# Patient Record
Sex: FEMALE | Race: WHITE | NOT HISPANIC OR LATINO | Employment: FULL TIME | URBAN - METROPOLITAN AREA
[De-identification: names, ages, dates, MRNs, and addresses within clinical notes are randomized per-mention and may not be internally consistent; named-entity substitution may affect disease eponyms.]

---

## 2017-02-20 ENCOUNTER — ALLSCRIPTS OFFICE VISIT (OUTPATIENT)
Dept: OTHER | Facility: OTHER | Age: 37
End: 2017-02-20

## 2018-01-14 VITALS
HEIGHT: 67 IN | BODY MASS INDEX: 45.99 KG/M2 | WEIGHT: 293 LBS | DIASTOLIC BLOOD PRESSURE: 90 MMHG | HEART RATE: 90 BPM | SYSTOLIC BLOOD PRESSURE: 130 MMHG

## 2018-01-15 NOTE — MISCELLANEOUS
Message  Return to work or school:   Dorothy Max is under my professional care  She was seen in my office on  2/10/2016    She is able to perform activities of daily living without limitations  OUT OF WORK ON 2/9/2016 DUE TO ILLNESS  DR SUERO/ABUNDIO        Signatures   Electronically signed by : ALEXANDER Mckeon ; Feb 11 2016  1:24PM EST                       (Author)

## 2018-01-31 DIAGNOSIS — F32.A DEPRESSION, UNSPECIFIED DEPRESSION TYPE: Primary | ICD-10-CM

## 2018-01-31 RX ORDER — SERTRALINE HYDROCHLORIDE 100 MG/1
TABLET, FILM COATED ORAL
COMMUNITY
Start: 2012-04-25 | End: 2018-01-31 | Stop reason: SDUPTHER

## 2018-01-31 RX ORDER — SERTRALINE HYDROCHLORIDE 100 MG/1
200 TABLET, FILM COATED ORAL DAILY
Qty: 60 TABLET | Refills: 0 | Status: SHIPPED | OUTPATIENT
Start: 2018-01-31 | End: 2018-03-22 | Stop reason: SDUPTHER

## 2018-01-31 NOTE — TELEPHONE ENCOUNTER
Patient is requesting a refill   Patient saw you for a follow up and refills in 10/2017  Was advised a follow up in 1 month  There seems to be some confusion with patients with these follow ups  They assume they had a follow up visit not realizing extenders are telling them to follow up in a month  Patients assume they did their follow up  Also it states in last note labs were ordered I do not see lab orders or results in the old system

## 2018-01-31 NOTE — TELEPHONE ENCOUNTER
Spoke with the patient over the phone  Refill of sertraline sent to the pharmacy  Patient instructed to follow-up with Dr Yosi Ugalde for her regular follow-up

## 2018-03-22 ENCOUNTER — OFFICE VISIT (OUTPATIENT)
Dept: FAMILY MEDICINE CLINIC | Facility: CLINIC | Age: 38
End: 2018-03-22
Payer: COMMERCIAL

## 2018-03-22 VITALS
HEIGHT: 67 IN | OXYGEN SATURATION: 98 % | HEART RATE: 90 BPM | DIASTOLIC BLOOD PRESSURE: 80 MMHG | BODY MASS INDEX: 45.99 KG/M2 | RESPIRATION RATE: 16 BRPM | SYSTOLIC BLOOD PRESSURE: 120 MMHG | WEIGHT: 293 LBS

## 2018-03-22 DIAGNOSIS — F32.A DEPRESSION, UNSPECIFIED DEPRESSION TYPE: ICD-10-CM

## 2018-03-22 DIAGNOSIS — N92.0 MENORRHAGIA WITH REGULAR CYCLE: Primary | ICD-10-CM

## 2018-03-22 PROCEDURE — 99213 OFFICE O/P EST LOW 20 MIN: CPT | Performed by: FAMILY MEDICINE

## 2018-03-22 RX ORDER — SERTRALINE HYDROCHLORIDE 100 MG/1
200 TABLET, FILM COATED ORAL DAILY
Qty: 60 TABLET | Refills: 3 | Status: SHIPPED | OUTPATIENT
Start: 2018-03-22 | End: 2018-07-28 | Stop reason: SDUPTHER

## 2018-03-22 NOTE — PROGRESS NOTES
Assessment/Plan:    Menorrhagia with regular cycle  She had passed  1 big clot this morning, since then bleeding is mild and she has no cramping  And she is not over do for her menstruation,  She is not sexually active  She has no insurance  Advise to see a gynecologist for her complete evaluation for menorrhagia and she needs a lab and ultrasound Of the pelvis  She says she will manage to see the gynecologist and advised her to go to ER if she continue having heavy  Or lightheadedness  She can take ibuprofen to help reduce if any cramping  Depression  Refill on Zoloft given       Diagnoses and all orders for this visit:    Menorrhagia with regular cycle  -     Ambulatory referral to Gynecology; Future    Depression, unspecified depression type  -     sertraline (ZOLOFT) 100 mg tablet; Take 2 tablets (200 mg total) by mouth daily          Blood pressure 120/80, pulse 90, resp  rate 16, height 5' 7" (1 702 m), weight (!) 139 kg (307 lb), SpO2 98 %  Subjective:      Patient ID: Cory Cisse is a 40 y o  female  She says that her periods are regular but she sometimes get heavy periods , this period Started about 3 days ago and had normal bleeding but this morning she had a big blood clot and then blood went to her legs and she felt that she might pass out , she said she went on the floor but she did not hit her head, and right now she has not having excessive bleeding  , she says this  Period Came on time and she did not have any over due period  She had her previous Pap smear 2-3 years ago and she does not go to gynecologist regularly, she also has no labs for a long time as she says she has no insurance and she does not work  She also wants to have refill on Zoloft which she takes 200 mg daily for depression for long time and has been doing well    She is nonsmoker  She says she is not sexually active        The following portions of the patient's history were reviewed and updated as appropriate: allergies, current medications, past family history, past medical history, past social history, past surgical history and problem list     Review of Systems   Constitutional: Negative for activity change, appetite change, chills, diaphoresis, fatigue, fever and unexpected weight change  HENT: Negative for congestion, dental problem, ear discharge, ear pain, facial swelling, hearing loss, mouth sores, nosebleeds, postnasal drip, rhinorrhea, sinus pain, sinus pressure, sneezing, sore throat, trouble swallowing and voice change  Eyes: Negative for photophobia, pain, discharge, redness and itching  Respiratory: Negative for cough, chest tightness, shortness of breath and wheezing  Cardiovascular: Negative for chest pain, palpitations and leg swelling  Gastrointestinal: Negative for abdominal distention, abdominal pain, blood in stool, constipation, diarrhea and nausea  Endocrine: Negative for cold intolerance, heat intolerance, polydipsia, polyphagia and polyuria  Genitourinary: Negative for dysuria, flank pain, frequency, hematuria and urgency  Musculoskeletal: Negative for arthralgias, back pain, myalgias and neck pain  Skin: Negative for color change and pallor  Allergic/Immunologic: Negative for environmental allergies and food allergies  Neurological: Negative for dizziness, weakness, light-headedness, numbness and headaches  Hematological: Negative for adenopathy  Does not bruise/bleed easily  Psychiatric/Behavioral: Negative for behavioral problems, sleep disturbance and suicidal ideas  The patient is not nervous/anxious  Objective:     Physical Exam   Constitutional: She is oriented to person, place, and time  She appears well-developed and well-nourished  HENT:   Head: Normocephalic and atraumatic  Right Ear: External ear normal    Left Ear: External ear normal    Nose: Nose normal    Mouth/Throat: Oropharynx is clear and moist  No oropharyngeal exudate     Eyes: Conjunctivae and EOM are normal  Pupils are equal, round, and reactive to light  Right eye exhibits no discharge  Left eye exhibits no discharge  No scleral icterus  Neck: Normal range of motion  Neck supple  No tracheal deviation present  No thyromegaly present  Cardiovascular: Normal rate, regular rhythm and normal heart sounds  No murmur heard  Pulmonary/Chest: Effort normal and breath sounds normal  No respiratory distress  She has no wheezes  She has no rales  Abdominal: Soft  Bowel sounds are normal  She exhibits no distension and no mass  There is no tenderness  There is no rebound  Musculoskeletal: Normal range of motion  She exhibits no edema  Lymphadenopathy:     She has no cervical adenopathy  Neurological: She is alert and oriented to person, place, and time  She has normal reflexes  No cranial nerve deficit  Skin: Skin is warm  No rash noted  No erythema  No pallor  Psychiatric: She has a normal mood and affect  Her behavior is normal  Judgment and thought content normal    Nursing note and vitals reviewed

## 2018-03-22 NOTE — ASSESSMENT & PLAN NOTE
She had passed  1 big clot this morning, since then bleeding is mild and she has no cramping  And she is not over do for her menstruation,  She is not sexually active  She has no insurance  Advise to see a gynecologist for her complete evaluation for menorrhagia and she needs a lab and ultrasound Of the pelvis  She says she will manage to see the gynecologist and advised her to go to ER if she continue having heavy  Or lightheadedness  She can take ibuprofen to help reduce if any cramping

## 2018-07-28 DIAGNOSIS — F32.A DEPRESSION, UNSPECIFIED DEPRESSION TYPE: ICD-10-CM

## 2018-07-30 RX ORDER — SERTRALINE HYDROCHLORIDE 100 MG/1
TABLET, FILM COATED ORAL
Qty: 60 TABLET | Refills: 3 | Status: SHIPPED | OUTPATIENT
Start: 2018-07-30 | End: 2018-11-26 | Stop reason: SDUPTHER

## 2018-11-26 DIAGNOSIS — F32.A DEPRESSION, UNSPECIFIED DEPRESSION TYPE: ICD-10-CM

## 2018-11-26 RX ORDER — SERTRALINE HYDROCHLORIDE 100 MG/1
TABLET, FILM COATED ORAL
Qty: 60 TABLET | Refills: 3 | Status: SHIPPED | OUTPATIENT
Start: 2018-11-26 | End: 2019-03-25 | Stop reason: SDUPTHER

## 2018-12-11 ENCOUNTER — OFFICE VISIT (OUTPATIENT)
Dept: FAMILY MEDICINE CLINIC | Facility: CLINIC | Age: 38
End: 2018-12-11

## 2018-12-11 VITALS
HEIGHT: 67 IN | DIASTOLIC BLOOD PRESSURE: 80 MMHG | WEIGHT: 293 LBS | SYSTOLIC BLOOD PRESSURE: 115 MMHG | TEMPERATURE: 99.1 F | HEART RATE: 92 BPM | RESPIRATION RATE: 18 BRPM | OXYGEN SATURATION: 98 % | BODY MASS INDEX: 45.99 KG/M2

## 2018-12-11 DIAGNOSIS — B37.3 YEAST INFECTION INVOLVING THE VAGINA AND SURROUNDING AREA: Primary | ICD-10-CM

## 2018-12-11 PROBLEM — B37.31 VAGINAL YEAST INFECTION: Status: ACTIVE | Noted: 2018-12-11

## 2018-12-11 PROCEDURE — 99213 OFFICE O/P EST LOW 20 MIN: CPT | Performed by: FAMILY MEDICINE

## 2018-12-11 RX ORDER — FLUCONAZOLE 150 MG/1
150 TABLET ORAL ONCE
Qty: 1 TABLET | Refills: 0 | Status: SHIPPED | OUTPATIENT
Start: 2018-12-11 | End: 2018-12-11

## 2018-12-11 RX ORDER — NYSTATIN 100000 [USP'U]/G
POWDER TOPICAL 2 TIMES DAILY
Qty: 15 G | Refills: 0 | Status: SHIPPED | OUTPATIENT
Start: 2018-12-11 | End: 2019-10-09 | Stop reason: ALTCHOICE

## 2018-12-11 NOTE — PROGRESS NOTES
Assessment/Plan:      Diagnoses and all orders for this visit:    Yeast infection involving the vagina and surrounding area  -     fluconazole (DIFLUCAN) 150 mg tablet; Take 1 tablet (150 mg total) by mouth once for 1 dose  -     nystatin (MYCOSTATIN) powder; Apply topically 2 (two) times a day      Discussed with the patient that her symptoms are caused by a yeast infection  Discussion on yeast infection and treatment  Diflucan and nystatin powder prescribed  Medication information and side effects reviewed  Patient instructed to keep the area clean and dry  Patient instructed to follow-up if symptoms get worse or do not get better  Collaborated on the plan for this patient with Dr Claribel Plasencia      Subjective:     Patient ID: Damir Doty is a 45 y o  female  Patient reports vaginal itching for the past few days  Patient reports that her vaginal area occasionally burns  Denies any fever  Patient reports alittle clumpy white vaginal discharge  Patient reports that she tried vagisil OTC  Denies any hematuria, urinary frequency, urgency, or dysuria  Patient reports that her symptoms are getting worse  Patient is not sexually active  Review of Systems   Constitutional: Negative for chills and fever  Respiratory: Negative for chest tightness, shortness of breath and wheezing  Cardiovascular: Negative for chest pain  Gastrointestinal: Negative for abdominal pain, diarrhea, nausea and vomiting  Genitourinary: Positive for vaginal discharge  Negative for dysuria, frequency, hematuria and urgency  Skin:        As noted in HPI  Neurological: Negative for dizziness, syncope, light-headedness and headaches  Objective:     Physical Exam   Constitutional: She is oriented to person, place, and time  No distress  Cardiovascular: Normal rate, regular rhythm and normal heart sounds  Pulmonary/Chest: Effort normal and breath sounds normal  No respiratory distress  She has no wheezes  Genitourinary:   Genitourinary Comments: White vaginal discharge noted  Pink macular rash noted in the bilateral groin, vulva, and perineal area  Musculoskeletal:   Gait wnl  Neurological: She is alert and oriented to person, place, and time  Psychiatric: She has a normal mood and affect  Vitals reviewed

## 2018-12-21 ENCOUNTER — TELEPHONE (OUTPATIENT)
Dept: FAMILY MEDICINE CLINIC | Facility: CLINIC | Age: 38
End: 2018-12-21

## 2018-12-21 DIAGNOSIS — B37.9 YEAST INFECTION: Primary | ICD-10-CM

## 2018-12-21 RX ORDER — FLUCONAZOLE 150 MG/1
150 TABLET ORAL ONCE
Qty: 1 TABLET | Refills: 0 | Status: SHIPPED | OUTPATIENT
Start: 2018-12-21 | End: 2018-12-21

## 2018-12-21 NOTE — TELEPHONE ENCOUNTER
I sent another diflucan to the pharmacy  Please let her know that if her symptoms continue she will need to follow-up with a GYN and please give her the number for [phone] [facility]

## 2018-12-21 NOTE — TELEPHONE ENCOUNTER
PT CALLED AND STATES HER YEAST INFECTION IS BETTER BUT STILL HAS IT AND THAT YOU TOLD HER TO CALL IF NOT BETTER, THAT YOU WOULD SEND ANOTHER DIFLUCAN

## 2019-03-25 DIAGNOSIS — F32.A DEPRESSION, UNSPECIFIED DEPRESSION TYPE: ICD-10-CM

## 2019-03-25 RX ORDER — SERTRALINE HYDROCHLORIDE 100 MG/1
TABLET, FILM COATED ORAL
Qty: 60 TABLET | Refills: 3 | Status: SHIPPED | OUTPATIENT
Start: 2019-03-25 | End: 2019-07-24 | Stop reason: SDUPTHER

## 2019-07-24 ENCOUNTER — OFFICE VISIT (OUTPATIENT)
Dept: FAMILY MEDICINE CLINIC | Facility: CLINIC | Age: 39
End: 2019-07-24

## 2019-07-24 VITALS
SYSTOLIC BLOOD PRESSURE: 144 MMHG | DIASTOLIC BLOOD PRESSURE: 70 MMHG | HEIGHT: 67 IN | WEIGHT: 288 LBS | OXYGEN SATURATION: 98 % | RESPIRATION RATE: 18 BRPM | BODY MASS INDEX: 45.2 KG/M2 | HEART RATE: 92 BPM

## 2019-07-24 DIAGNOSIS — F32.A DEPRESSION, UNSPECIFIED DEPRESSION TYPE: Primary | ICD-10-CM

## 2019-07-24 DIAGNOSIS — F32.A DEPRESSION, UNSPECIFIED DEPRESSION TYPE: ICD-10-CM

## 2019-07-24 DIAGNOSIS — E66.01 CLASS 3 SEVERE OBESITY DUE TO EXCESS CALORIES WITH BODY MASS INDEX (BMI) OF 45.0 TO 49.9 IN ADULT, UNSPECIFIED WHETHER SERIOUS COMORBIDITY PRESENT (HCC): ICD-10-CM

## 2019-07-24 PROCEDURE — 99212 OFFICE O/P EST SF 10 MIN: CPT | Performed by: FAMILY MEDICINE

## 2019-07-24 RX ORDER — SERTRALINE HYDROCHLORIDE 100 MG/1
TABLET, FILM COATED ORAL
Qty: 60 TABLET | Refills: 3 | OUTPATIENT
Start: 2019-07-24

## 2019-07-24 RX ORDER — SERTRALINE HYDROCHLORIDE 100 MG/1
200 TABLET, FILM COATED ORAL DAILY
Qty: 180 TABLET | Refills: 0 | Status: SHIPPED | OUTPATIENT
Start: 2019-07-24 | End: 2019-09-12 | Stop reason: SDUPTHER

## 2019-07-24 NOTE — TELEPHONE ENCOUNTER
[facility]  PATIENT 8/6/2019 WITH PCP   PLEASE REFUSE SO I CAN DONE MESSAGE

## 2019-07-24 NOTE — PROGRESS NOTES
Assessment/Plan:   Diagnoses and all orders for this visit:    Depression, unspecified depression type  -     Vitamin D 25 hydroxy; Future  -     TSH, 3rd generation with Free T4 reflex; Future  -     Ambulatory referral to [facility]; Future  -     sertraline (ZOLOFT) 100 mg tablet; Take 2 tablets (200 mg total) by mouth daily  - well controlled on Zoloft 200mg PO QD - PHQ-9 score of 1 - RF given   - referred to Norma Bautista, interim in-office therapist   - advised to get TSH and Vit D levels checked  - RTO in 3months for f/u - pt aware and agreeable     Class 3 severe obesity due to excess calories with body mass index (BMI) of 45 0 to 49 9 in adult, unspecified whether serious comorbidity present ([facility] [address] )  - discussed diet and exercise         Subjective:    Patient ID: Sherine Covington is a 44 y o  female  42yo F presents to the office for f/u of Depression/Anxiety   - currently on Zoloft 200mg QD - has been on this dose since 2013 and tolerating it well   - PHQ-9 score of 1 in the office today, denies SI/HI   - has supportive family   - was hospitalized at [facility] and then transferred to [facility] for 3wks in 2013  - (+) FHx of bipolar/manic depressive in Dad  - does not have a therapist that she follows with but interested   - walks QD  - diet: watches salt, eats fruits/veggies QD  - no recent labs     The following portions of the patient's history were reviewed and updated as appropriate: allergies, current medications, past family history, past medical history, past social history, past surgical history and problem list     Review of Systems  as per HPI    Objective:  /70   Pulse 92   Resp 18   Ht 5' 7" (1 702 m)   Wt 131 kg (288 lb)   SpO2 98%   BMI 45 11 kg/m²    Physical Exam   Constitutional: She is oriented to person, place, and time  She appears well-developed and well-nourished  No distress  BMI 45   HENT:   Head: Normocephalic and atraumatic     Right Ear: External ear normal    Left Ear: External ear normal    Nose: Nose normal    Eyes: Conjunctivae and EOM are normal  Right eye exhibits no discharge  Left eye exhibits no discharge  No scleral icterus  Neck: Normal range of motion  Neurological: She is alert and oriented to person, place, and time  Skin: No rash noted  She is not diaphoretic  No erythema  No pallor  Psychiatric: She has a normal mood and affect  Her speech is normal and behavior is normal  Judgment and thought content normal  Cognition and memory are normal  She expresses no homicidal and no suicidal ideation  She expresses no suicidal plans and no homicidal plans  PHQ-9 score of 1      BMI Counseling: Body mass index is 45 11 kg/m²  Discussed the patient's BMI with her  The BMI is above average  BMI counseling and education was provided to the patient  Nutrition recommendations include reducing portion sizes  Exercise recommendations include moderate aerobic physical activity for 150 minutes/week

## 2019-09-12 ENCOUNTER — OFFICE VISIT (OUTPATIENT)
Dept: FAMILY MEDICINE CLINIC | Facility: CLINIC | Age: 39
End: 2019-09-12

## 2019-09-12 VITALS
OXYGEN SATURATION: 98 % | WEIGHT: 275 LBS | SYSTOLIC BLOOD PRESSURE: 140 MMHG | BODY MASS INDEX: 43.16 KG/M2 | HEIGHT: 67 IN | HEART RATE: 100 BPM | RESPIRATION RATE: 16 BRPM | DIASTOLIC BLOOD PRESSURE: 72 MMHG

## 2019-09-12 DIAGNOSIS — N92.1 METRORRHAGIA: Primary | ICD-10-CM

## 2019-09-12 DIAGNOSIS — E11.65 TYPE 2 DIABETES MELLITUS WITH HYPERGLYCEMIA, WITHOUT LONG-TERM CURRENT USE OF INSULIN (HCC): ICD-10-CM

## 2019-09-12 DIAGNOSIS — F32.A DEPRESSION, UNSPECIFIED DEPRESSION TYPE: ICD-10-CM

## 2019-09-12 DIAGNOSIS — D50.0 IRON DEFICIENCY ANEMIA DUE TO CHRONIC BLOOD LOSS: ICD-10-CM

## 2019-09-12 PROCEDURE — 99212 OFFICE O/P EST SF 10 MIN: CPT | Performed by: FAMILY MEDICINE

## 2019-09-12 RX ORDER — FERROUS SULFATE 325(65) MG
1 TABLET ORAL 2 TIMES DAILY WITH MEALS
Qty: 180 TABLET | Refills: 1 | Status: SHIPPED | OUTPATIENT
Start: 2019-09-12 | End: 2021-03-12 | Stop reason: HOSPADM

## 2019-09-12 RX ORDER — FERROUS SULFATE 325(65) MG
1 TABLET ORAL DAILY
Refills: 0 | COMMUNITY
Start: 2019-08-29 | End: 2019-09-12 | Stop reason: SDUPTHER

## 2019-09-12 RX ORDER — GLIMEPIRIDE 2 MG/1
2 TABLET ORAL
Refills: 0 | COMMUNITY
Start: 2019-08-29 | End: 2019-09-12 | Stop reason: SDUPTHER

## 2019-09-12 RX ORDER — GLIMEPIRIDE 2 MG/1
2 TABLET ORAL
Qty: 90 TABLET | Refills: 1 | Status: SHIPPED | OUTPATIENT
Start: 2019-09-12 | End: 2020-02-17

## 2019-09-12 RX ORDER — SERTRALINE HYDROCHLORIDE 100 MG/1
200 TABLET, FILM COATED ORAL DAILY
Qty: 180 TABLET | Refills: 1 | Status: SHIPPED | OUTPATIENT
Start: 2019-09-12 | End: 2020-03-04

## 2019-09-12 RX ORDER — BLOOD SUGAR DIAGNOSTIC
STRIP MISCELLANEOUS
Refills: 0 | COMMUNITY
Start: 2019-08-29 | End: 2019-10-09 | Stop reason: SDUPTHER

## 2019-09-12 NOTE — ASSESSMENT & PLAN NOTE
Iron deficiency anemia due to heavy blood loss due to menorrhagia, advised to take iron tablet twice a day and see gynecologist for correction of her metrorrhagia

## 2019-09-12 NOTE — ASSESSMENT & PLAN NOTE
Recently diagnosed diabetic, she is now on metformin and glimepiride, she will, taking same medications    Keep monitoring her blood sugar and advised to have follow up again in 1 month, and advised to see eye doctor and her foot exam is done today    Blood Sugar Average: Last 72 hrs:

## 2019-09-12 NOTE — PROGRESS NOTES
Assessment/Plan:    Problem List Items Addressed This Visit        Endocrine    Type 2 diabetes mellitus with hyperglycemia, without long-term current use of insulin ([address] )     Recently diagnosed diabetic, she is now on metformin and glimepiride, she will, taking same medications  Keep monitoring her blood sugar and advised to have follow up again in 1 month, and advised to see eye doctor and her foot exam is done today    Blood Sugar Average: Last 72 hrs:             Relevant Medications    glimepiride (AMARYL) 2 mg tablet    metFORMIN (GLUCOPHAGE) 500 mg tablet    Other Relevant Orders    CBC and differential    Comprehensive metabolic panel    Lipid panel    Hemoglobin A1C    TSH, 3rd generation    Microalbumin / creatinine urine ratio       Other    Depression     Depression under control, continue Zoloft         Relevant Medications    sertraline (ZOLOFT) 100 mg tablet    Metrorrhagia - Primary     SHE HAS RECENT MATTER REGION AND DROPPED HER HEMOGLOBIN AND SHE HAD A BLOOD TRANSFUSION AT [address] [place] [address], CURRENTLY TAKING 1 IRON TABLET DAILY I ADVISED HER TO TAKE 2 DAILY AND SEE THE GYNECOLOGIST SOON AS HER ENDOMETRIUM IS THICKENED ON ULTRASOUND, CURRENTLY SHE IS NOT BLEEDING         Relevant Orders    Ambulatory referral to Gynecology    Iron deficiency anemia due to chronic blood loss     Iron deficiency anemia due to heavy blood loss due to menorrhagia, advised to take iron tablet twice a day and see gynecologist for correction of her metrorrhagia         Relevant Medications    FEROSUL 325 (65 Fe) MG tablet          Chief Complaint   Patient presents with    Follow-up     FROM ER       Subjective:   Patient ID: Jet Cuellar is a 44 y o  female      SHE WAS ADMITTED IN [address] [place] [address] ON AUGUST 28 BECAUSE OF HEAVY VAGINAL BLEEDING AND SHE WAS ADMITTED AND SHE HAD LOW HEMOGLOBIN AND WAS GIVEN A BLOOD TRANSFUSION, AND NOW SHE IS TAKING IRON EVERY DAY, SHE HAD LABS AND SHE WAS FOUND SHE IS DIABETIC AND THIS STARTED HER ON METFORMIN TWICE A DAY AND AMARYL, SHE IS TOLERATING THOSE MEDICATION  SHE IS ADVISED TO SEE GYNECOLOGIST AND SHE HAS NOT MADE APPOINTMENT YET, BUT SHE PLANS TO DO THAT, SHE SAYS SHE HAS NO BLEEDING SINCE SHE WENT TO [facility],  SHE HER BLOOD SUGAR HAS BEEN BETWEEN RANGE -140 MOST OF THE TIME  SHE CHECKS HER BLOOD SUGAR TWICE A DAY BUT IF SUGAR IS LOW THEN SHE CHECKS 3 TIMES A DAY, SO THE LOWEST SUGAR WAS 77 BUT THAT IS NOT DAILY  HER ANXIETY IS UNDER CONTROL WITH ZOLOFT 200 MILLIGRAM   SHE WORKS IN [employer]  NON SMOKER      Review of Systems   Constitutional: Negative for activity change, appetite change, chills, diaphoresis, fatigue, fever and unexpected weight change  HENT: Negative for congestion, dental problem, ear discharge, ear pain, facial swelling, hearing loss, mouth sores, nosebleeds, postnasal drip, rhinorrhea, sinus pressure, sinus pain, sneezing, sore throat, trouble swallowing and voice change  Eyes: Negative for photophobia, pain, discharge, redness and itching  Respiratory: Negative for cough, chest tightness, shortness of breath and wheezing  Cardiovascular: Negative for chest pain, palpitations and leg swelling  Gastrointestinal: Negative for abdominal distention, abdominal pain, blood in stool, constipation, diarrhea and nausea  Endocrine: Negative for cold intolerance, heat intolerance, polydipsia, polyphagia and polyuria  Genitourinary: Negative for dysuria, flank pain, frequency, hematuria and urgency  Musculoskeletal: Negative for arthralgias, back pain, myalgias and neck pain  Skin: Negative for color change and pallor  Allergic/Immunologic: Negative for environmental allergies and food allergies  Neurological: Negative for dizziness, weakness, light-headedness, numbness and headaches  Hematological: Negative for adenopathy  Does not bruise/bleed easily     Psychiatric/Behavioral: Negative for behavioral problems, sleep disturbance and suicidal ideas  The patient is not nervous/anxious  Objective:   Physical Exam   Constitutional: She is oriented to person, place, and time  She appears well-developed and well-nourished  HENT:   Head: Normocephalic and atraumatic  Nose: Nose normal    Mouth/Throat: Oropharynx is clear and moist  No oropharyngeal exudate  Eyes: Pupils are equal, round, and reactive to light  Conjunctivae and EOM are normal  Right eye exhibits no discharge  Left eye exhibits no discharge  No scleral icterus  Neck: Normal range of motion  Neck supple  No tracheal deviation present  No thyromegaly present  Cardiovascular: Normal rate, regular rhythm and normal heart sounds  No murmur heard  Pulses:       Dorsalis pedis pulses are 2+ on the right side, and 2+ on the left side  Pulmonary/Chest: Effort normal and breath sounds normal  No respiratory distress  She has no wheezes  She has no rales  Abdominal: Soft  Bowel sounds are normal  She exhibits no distension and no mass  There is no tenderness  There is no rebound  Musculoskeletal: Normal range of motion  She exhibits no edema  Feet:   Right Foot:   Skin Integrity: Negative for ulcer, skin breakdown, erythema, warmth, callus or dry skin  Left Foot:   Skin Integrity: Negative for ulcer, skin breakdown, erythema, warmth, callus or dry skin  Lymphadenopathy:     She has no cervical adenopathy  Neurological: She is alert and oriented to person, place, and time  She has normal reflexes  No cranial nerve deficit  Skin: Skin is warm  No rash noted  No erythema  No pallor  Psychiatric: She has a normal mood and affect  Her behavior is normal  Judgment and thought content normal    Nursing note and vitals reviewed  Patient's shoes and socks removed  Right Foot/Ankle   Right Foot Inspection  Skin Exam: skin normal skin not intact, no dry skin, no warmth, no callus, no erythema, no maceration, no abnormal color, no pre-ulcer, no ulcer and no callus Toe Exam: ROM and strength within normal limits  Sensory   Vibration: intact  Proprioception: intact   Monofilament testing: intact  Vascular  Capillary refills: < 3 seconds  The right DP pulse is 2+  Left Foot/Ankle  Left Foot Inspection  Skin Exam: skin normalskin not intact, no dry skin, no warmth, no erythema, no maceration, normal color, no pre-ulcer, no ulcer and no callus                         Toe Exam: ROM and strength within normal limits                   Sensory   Vibration: intact  Proprioception: intact  Monofilament: intact  Vascular  Capillary refills: < 3 seconds  The left DP pulse is 2+  Assign Risk Category:  ; ;        Risk: 0          BMI Counseling: Body mass index is 43 07 kg/m²  The BMI is above normal  Nutrition recommendations include reducing portion sizes, decreasing overall calorie intake and 3-5 servings of fruits/vegetables daily  Exercise recommendations include moderate aerobic physical activity for 150 minutes/week    Past Surgical History:   Procedure Laterality Date    NO PAST SURGERIES         Family History   Problem Relation Age of Onset    Hypertension Mother     Lung cancer Maternal Grandfather          Current Outpatient Medications:     FEROSUL 325 (65 Fe) MG tablet, Take 1 tablet (325 mg total) by mouth 2 (two) times a day with meals, Disp: 180 tablet, Rfl: 1    glimepiride (AMARYL) 2 mg tablet, Take 1 tablet (2 mg total) by mouth daily before breakfast, Disp: 90 tablet, Rfl: 1    metFORMIN (GLUCOPHAGE) 500 mg tablet, Take 1 tablet (500 mg total) by mouth 2 (two) times a day with meals, Disp: 180 tablet, Rfl: 1    nystatin (MYCOSTATIN) powder, Apply topically 2 (two) times a day, Disp: 15 g, Rfl: 0    ONETOUCH DELICA LANCETS FINE MISC, use 1 LANCET to TEST BLOOD SUGAR twice a day, Disp: , Rfl: 0    ONETOUCH VERIO test strip, use 1 TEST STRIP to TEST BLOOD SUGAR twice a day, Disp: , Rfl: 0    sertraline (ZOLOFT) 100 mg tablet, Take 2 tablets (200 mg total) by mouth daily, Disp: 180 tablet, Rfl: 1    No Known Allergies    Vitals:    09/12/19 0932 09/12/19 1002   BP: 140/72    Pulse: (!) 112 100   Resp: 16    SpO2: 98%    Weight: 125 kg (275 lb)    Height: 5' 7" (1 702 m)

## 2019-09-12 NOTE — ASSESSMENT & PLAN NOTE
SHE HAS RECENT MATTER REGION AND DROPPED HER HEMOGLOBIN AND SHE HAD A BLOOD TRANSFUSION AT [facility], CURRENTLY TAKING 1 IRON TABLET DAILY I ADVISED HER TO TAKE 2 DAILY AND SEE THE GYNECOLOGIST SOON AS HER ENDOMETRIUM IS THICKENED ON ULTRASOUND, CURRENTLY SHE IS NOT BLEEDING

## 2019-10-09 ENCOUNTER — OFFICE VISIT (OUTPATIENT)
Dept: FAMILY MEDICINE CLINIC | Facility: CLINIC | Age: 39
End: 2019-10-09
Payer: COMMERCIAL

## 2019-10-09 VITALS
DIASTOLIC BLOOD PRESSURE: 72 MMHG | BODY MASS INDEX: 42.69 KG/M2 | SYSTOLIC BLOOD PRESSURE: 144 MMHG | RESPIRATION RATE: 18 BRPM | HEART RATE: 82 BPM | HEIGHT: 67 IN | WEIGHT: 272 LBS | OXYGEN SATURATION: 98 %

## 2019-10-09 DIAGNOSIS — D50.0 IRON DEFICIENCY ANEMIA DUE TO CHRONIC BLOOD LOSS: ICD-10-CM

## 2019-10-09 DIAGNOSIS — E11.65 TYPE 2 DIABETES MELLITUS WITH HYPERGLYCEMIA, WITHOUT LONG-TERM CURRENT USE OF INSULIN (HCC): Primary | ICD-10-CM

## 2019-10-09 DIAGNOSIS — N92.1 METRORRHAGIA: ICD-10-CM

## 2019-10-09 DIAGNOSIS — Z23 NEED FOR VACCINATION FOR PNEUMOCOCCUS: ICD-10-CM

## 2019-10-09 PROBLEM — B37.31 YEAST INFECTION INVOLVING THE VAGINA AND SURROUNDING AREA: Status: RESOLVED | Noted: 2018-12-11 | Resolved: 2019-10-09

## 2019-10-09 PROBLEM — N92.0 MENORRHAGIA WITH REGULAR CYCLE: Status: RESOLVED | Noted: 2018-03-22 | Resolved: 2019-10-09

## 2019-10-09 PROBLEM — B37.3 YEAST INFECTION INVOLVING THE VAGINA AND SURROUNDING AREA: Status: RESOLVED | Noted: 2018-12-11 | Resolved: 2019-10-09

## 2019-10-09 PROCEDURE — 90732 PPSV23 VACC 2 YRS+ SUBQ/IM: CPT | Performed by: FAMILY MEDICINE

## 2019-10-09 PROCEDURE — 90471 IMMUNIZATION ADMIN: CPT | Performed by: FAMILY MEDICINE

## 2019-10-09 PROCEDURE — 99214 OFFICE O/P EST MOD 30 MIN: CPT | Performed by: FAMILY MEDICINE

## 2019-10-09 NOTE — ASSESSMENT & PLAN NOTE
Continue taking iron tablet twice a day and she will get her labs in December and she will follow up gynecologist to control her metrorrhagia

## 2019-10-09 NOTE — PROGRESS NOTES
Assessment/Plan:    Problem List Items Addressed This Visit        Endocrine    Type 2 diabetes mellitus with hyperglycemia, without long-term current use of insulin ([facility] [address] ) - Primary     She has been taking metformin twice a day and glimepiride 2 mg daily and she monitors her blood sugar twice a day most of the time is in good range from 100-110, occasionally she has low blood sugar and she just managed to take some food and she was fine  No results found for: HGBA1C         Relevant Medications    ONETOUCH DELICA LANCETS FINE MISC       Other    Metrorrhagia     She has no bleeding since she was admitted in the hospital in August, she is currently taking 2 iron tablet daily and she did not see the gynecologist yet, she plans to do soon         Iron deficiency anemia due to chronic blood loss     Continue taking iron tablet twice a day and she will get her labs in December and she will follow up gynecologist to control her metrorrhagia          Need for vaccination for pneumococcus    Relevant Orders    PNEUMOCOCCAL POLYSACCHARIDE VACCINE 23-VALENT =>3YO SQ IM (Completed)        Advised to keep monitoring her blood pressure and follow-up if her blood pressure remains above 140,, advised to have low-salt diet and continue losing weight  Chief Complaint   Patient presents with    Follow-up     1 month       Subjective:   Patient ID: Amanda Garcia is a 44 y o  female      She also complain of some nasal stuffy and watery eyes because she has some allergies recently and currently she is not taking any medication and she is asking if she can take Claritin for her allergies, she denies any fever and chills  She is working on her diet she has recently diagnosed diabetic and on metformin and glimepiride and she checks her blood sugar twice a day, she is working on her diet so she can lose more weight,  She is taking currently 2 iron tablet daily and she has no heavy periods since she was admitted in the hospital in August and she is supposed to see the gynecologist so that her periods can be controlled so she does not lose more blood  She had a blood transfusion in the hospital due to anemia due to heavy blood loss through her menstrual period      Review of Systems   Constitutional: Negative for activity change, appetite change, chills, diaphoresis, fatigue, fever and unexpected weight change  HENT: Negative for congestion, dental problem, ear discharge, ear pain, facial swelling, hearing loss, mouth sores, nosebleeds, postnasal drip, rhinorrhea, sinus pressure, sinus pain, sneezing, sore throat, trouble swallowing and voice change  Eyes: Negative for photophobia, pain, discharge, redness and itching  Respiratory: Negative for cough, chest tightness, shortness of breath and wheezing  Cardiovascular: Negative for chest pain, palpitations and leg swelling  Gastrointestinal: Negative for abdominal distention, abdominal pain, blood in stool, constipation, diarrhea and nausea  Endocrine: Negative for cold intolerance, heat intolerance, polydipsia, polyphagia and polyuria  Genitourinary: Negative for dysuria, flank pain, frequency, hematuria and urgency  Musculoskeletal: Negative for arthralgias, back pain, myalgias and neck pain  Skin: Negative for color change and pallor  Allergic/Immunologic: Negative for environmental allergies and food allergies  Neurological: Negative for dizziness, weakness, light-headedness, numbness and headaches  Hematological: Negative for adenopathy  Does not bruise/bleed easily  Psychiatric/Behavioral: Negative for behavioral problems, sleep disturbance and suicidal ideas  The patient is not nervous/anxious  Objective:  Physical Exam   Constitutional: She is oriented to person, place, and time  She appears well-developed and well-nourished  HENT:   Head: Normocephalic and atraumatic     Nose: Nose normal    Mouth/Throat: Oropharynx is clear and moist  No oropharyngeal exudate  Eyes: Pupils are equal, round, and reactive to light  Conjunctivae and EOM are normal  Right eye exhibits no discharge  Left eye exhibits no discharge  No scleral icterus  Neck: Normal range of motion  Neck supple  No tracheal deviation present  No thyromegaly present  Cardiovascular: Normal rate, regular rhythm and normal heart sounds  No murmur heard  Pulmonary/Chest: Effort normal and breath sounds normal  No respiratory distress  She has no wheezes  She has no rales  Abdominal: Soft  Bowel sounds are normal  She exhibits no distension and no mass  There is no tenderness  There is no rebound  Musculoskeletal: Normal range of motion  She exhibits no edema  Lymphadenopathy:     She has no cervical adenopathy  Neurological: She is alert and oriented to person, place, and time  She has normal reflexes  No cranial nerve deficit  Skin: Skin is warm  No rash noted  No erythema  No pallor  Psychiatric: She has a normal mood and affect  Her behavior is normal  Judgment and thought content normal    Nursing note and vitals reviewed           Past Surgical History:   Procedure Laterality Date    NO PAST SURGERIES         Family History   Problem Relation Age of Onset    Hypertension Mother     Lung cancer Maternal Grandfather          Current Outpatient Medications:     FEROSUL 325 (65 Fe) MG tablet, Take 1 tablet (325 mg total) by mouth 2 (two) times a day with meals, Disp: 180 tablet, Rfl: 1    glimepiride (AMARYL) 2 mg tablet, Take 1 tablet (2 mg total) by mouth daily before breakfast, Disp: 90 tablet, Rfl: 1    metFORMIN (GLUCOPHAGE) 500 mg tablet, Take 1 tablet (500 mg total) by mouth 2 (two) times a day with meals, Disp: 180 tablet, Rfl: 1    ONETOUCH DELICA LANCETS FINE MISC, Check blood sugar twice a day, newly diagnosed diabetic, Disp: 100 each, Rfl: 2    ONETOUCH VERIO test strip, use 1 TEST STRIP to TEST BLOOD SUGAR twice a day, Disp: , Rfl: 0    sertraline (ZOLOFT) 100 mg tablet, Take 2 tablets (200 mg total) by mouth daily, Disp: 180 tablet, Rfl: 1    No Known Allergies    Vitals:    10/09/19 0941   BP: 144/72   Pulse: 82   Resp: 18   SpO2: 98%   Weight: 123 kg (272 lb)   Height: 5' 7" (1 702 m)

## 2019-10-09 NOTE — ASSESSMENT & PLAN NOTE
She has no bleeding since she was admitted in the hospital in August, she is currently taking 2 iron tablet daily and she did not see the gynecologist yet, she plans to do soon

## 2019-10-09 NOTE — ASSESSMENT & PLAN NOTE
She has been taking metformin twice a day and glimepiride 2 mg daily and she monitors her blood sugar twice a day most of the time is in good range from 100-110, occasionally she has low blood sugar and she just managed to take some food and she was fine  No results found for: HGBA1C

## 2019-10-10 RX ORDER — BLOOD SUGAR DIAGNOSTIC
STRIP MISCELLANEOUS
Qty: 100 EACH | Refills: 3 | Status: SHIPPED | OUTPATIENT
Start: 2019-10-10 | End: 2020-01-02

## 2019-10-25 ENCOUNTER — OFFICE VISIT (OUTPATIENT)
Dept: OBGYN CLINIC | Facility: CLINIC | Age: 39
End: 2019-10-25
Payer: COMMERCIAL

## 2019-10-25 VITALS — WEIGHT: 273 LBS | DIASTOLIC BLOOD PRESSURE: 84 MMHG | BODY MASS INDEX: 42.76 KG/M2 | SYSTOLIC BLOOD PRESSURE: 122 MMHG

## 2019-10-25 DIAGNOSIS — N93.9 ABNORMAL UTERINE BLEEDING: Primary | ICD-10-CM

## 2019-10-25 DIAGNOSIS — N94.89 MENSTRUAL SUPPRESSION: ICD-10-CM

## 2019-10-25 PROBLEM — N92.1 METRORRHAGIA: Status: RESOLVED | Noted: 2019-09-12 | Resolved: 2019-10-25

## 2019-10-25 PROCEDURE — 99204 OFFICE O/P NEW MOD 45 MIN: CPT | Performed by: OBSTETRICS & GYNECOLOGY

## 2019-10-25 RX ORDER — ACETAMINOPHEN AND CODEINE PHOSPHATE 120; 12 MG/5ML; MG/5ML
1 SOLUTION ORAL DAILY
Qty: 84 TABLET | Refills: 3 | Status: SHIPPED | OUTPATIENT
Start: 2019-10-25 | End: 2019-12-26

## 2019-10-25 RX ORDER — MEDROXYPROGESTERONE ACETATE 10 MG/1
10 TABLET ORAL DAILY
Qty: 10 TABLET | Refills: 0 | Status: SHIPPED | OUTPATIENT
Start: 2019-10-25 | End: 2020-02-19

## 2019-10-25 NOTE — PROGRESS NOTES
Assessment Frannie was seen today for follow-up  Diagnoses and all orders for this visit:    Abnormal uterine bleeding    Menstrual suppression  -     medroxyPROGESTERone (PROVERA) 10 mg tablet; Take 1 tablet (10 mg total) by mouth daily for 10 days  -     norethindrone (MICRONOR) 0 35 MG tablet; Take 1 tablet (0 35 mg total) by mouth daily Take at the same time every day  Start after 7 days of bleeding  Plan   Discussed recommendations for menstrual suppression given her long history of anovulatory bleeding  I suspect the patient has PCOS clinically based on her hirsuitism, androgenic alopecia and oligoanovulation  No laboratory work up indicated at this time  We reviewed her options for menstrual control  She has had multiple elevated BP readings at her PCP visits, therefore I do not recommend estrogen containing hormones  We discussed progestin only methods including POPs, DMPA, implant, LNG-IUD  We reviewed the anticipated changes to bleeding pattern - likely continued irregular bleeding but should be much lighter  We reviewed methods of use of each  The patient currently does not have a partner but did want to make sure that these methods are reversible and do not have long term fertility effects - they are, but she would also likely need ART if she desires pregnancy given her irregular ovulation and her age  The patient would like to start POPs  As she has not had a cycle x 2 months will do a 10 day course of 10mg of provera to induce a period and thin her endometrial lining  After 7 days of bleeding, she is to start micronor daily for suppression  May also consider endometrial biopsy in the future, particularly if the patient continues to have irregular bleeding on micronor  Follow up in 3 months  Will also need to schedule annual exam  Due for pap this spring  Jose Plascencia is a 44 y o  female here for a problem visit    Patient is complaining of very heavy menstrual bleeding  She was admitted to [facility] in August with extremely heavy vaginal bleeding and anemia  She was given a blood transfusion and started on Fe supplementation  She was also diagnosed with type 2 diabetes during this hospitalization  She states she was seen by an OB/GYN at the time for a pelvic exam but was not started on any medication nor given any follow up instructions  Has cycles every 2-3 months  Has not had bleeding since the episode in August  Her cycles have been irregular for several years  Not currently on any medication to regulate her cycles nor has she been in the past  Cycles are always heavy with passage of large blood clots and very heavy flow  Requires use of overnight pads  Had a similar episode about 6 years ago - very heavy bleeding  Did not require blood transfusion at that time  Has never been sexually active with a male partner  Patient Active Problem List   Diagnosis    Depression    Iron deficiency anemia due to chronic blood loss    Type 2 diabetes mellitus with hyperglycemia, without long-term current use of insulin ([facility] [address] )    Need for vaccination for pneumococcus         Gynecologic History  Patient's last menstrual period was 2019  Last Pap: 2015  Results were: normal; HPV negative  No history of abnormal pap in the past      Obstetric History  OB History    Para Term  AB Living   0 0 0 0 0 0   SAB TAB Ectopic Multiple Live Births   0 0 0 0 0       Past Medical/Surgical/Family/Social History  The following portions of the patient's history were reviewed and updated as appropriate: allergies, current medications, past family history, past medical history, past social history and past surgical history  Allergies  Patient has no known allergies      Medications    Current Outpatient Medications:     FEROSUL 325 (65 Fe) MG tablet, Take 1 tablet (325 mg total) by mouth 2 (two) times a day with meals, Disp: 180 tablet, Rfl: 1    glimepiride (AMARYL) 2 mg tablet, Take 1 tablet (2 mg total) by mouth daily before breakfast, Disp: 90 tablet, Rfl: 1    metFORMIN (GLUCOPHAGE) 500 mg tablet, Take 1 tablet (500 mg total) by mouth 2 (two) times a day with meals, Disp: 180 tablet, Rfl: 1    ONETOUCH DELICA LANCETS FINE MISC, Check blood sugar twice a day, newly diagnosed diabetic, Disp: 100 each, Rfl: 2    ONETOUCH VERIO test strip, use 1 TEST STRIP to TEST BLOOD SUGAR twice a day, Disp: 100 each, Rfl: 3    sertraline (ZOLOFT) 100 mg tablet, Take 2 tablets (200 mg total) by mouth daily, Disp: 180 tablet, Rfl: 1    medroxyPROGESTERone (PROVERA) 10 mg tablet, Take 1 tablet (10 mg total) by mouth daily for 10 days, Disp: 10 tablet, Rfl: 0    norethindrone (MICRONOR) 0 35 MG tablet, Take 1 tablet (0 35 mg total) by mouth daily Take at the same time every day  Start after 7 days of bleeding , Disp: 84 tablet, Rfl: 3      Review of Systems  Constitutional: no fever, feels well, no tiredness, no recent weight gain or loss  Breasts:no complaints of breast pain, breast lump, or nipple discharge  Gastrointestinal: no complaints of abdominal pain, constipation, nausea, vomiting, or diarrhea or bloody stools  Genitourinary : see HPI       Objective     /84   Wt 124 kg (273 lb)   LMP 08/28/2019   BMI 42 76 kg/m²     General: alert and oriented, in no acute distress  Vulva: normal, Bartholin's, Urethra, Nord's normal  Vagina: normal mucosa  Cervix:  no cervical motion tenderness  Uterus:  very difficult to palpate due to body habitus and patient discomfort with pelvic exam  Nontender, mobile, approx 8 wks in size  TVUS  Report reviewed from [facility]  Done 9/13/19  Ut: 10 6 x 6 4 x 5 9cm  No evidence of fibroids  Endometrium: 24 mm   Ovaries: R is normal, no masses  L is normal, no masses with what appears to be an adjacent hydrosalpinx

## 2019-11-06 ENCOUNTER — TELEPHONE (OUTPATIENT)
Dept: FAMILY MEDICINE CLINIC | Facility: CLINIC | Age: 39
End: 2019-11-06

## 2019-11-06 DIAGNOSIS — E11.65 TYPE 2 DIABETES MELLITUS WITH HYPERGLYCEMIA, WITHOUT LONG-TERM CURRENT USE OF INSULIN (HCC): Primary | ICD-10-CM

## 2019-11-07 DIAGNOSIS — E11.65 TYPE 2 DIABETES MELLITUS WITH HYPERGLYCEMIA, WITHOUT LONG-TERM CURRENT USE OF INSULIN (HCC): Primary | ICD-10-CM

## 2019-11-07 NOTE — TELEPHONE ENCOUNTER
Patient has a new meter due to insurance and now needs the Accu chek guide lancets sent to the pharmacy     [employer] [address]

## 2019-11-18 ENCOUNTER — TELEPHONE (OUTPATIENT)
Dept: OBGYN CLINIC | Facility: CLINIC | Age: 39
End: 2019-11-18

## 2019-11-18 NOTE — TELEPHONE ENCOUNTER
At last visit 10/25/19 advised to take provera 10mg one daily for 10 days then start Micronor after 7 days of bleeding  States took provera and had bleeding for 6 days then started Micronor  Started Micronor a little over a week ago  Now she is having bleeding like a period  States she is taking pill  the same time daily  Advised it can be normal to have irregular bleeding for the first 3 months on OCP  Advised to monitor call if she becomes symptomatic  Verbalized understanding  Routing to provider for further recommendations

## 2019-11-30 ENCOUNTER — TELEPHONE (OUTPATIENT)
Dept: OTHER | Facility: OTHER | Age: 39
End: 2019-11-30

## 2019-11-30 NOTE — TELEPHONE ENCOUNTER
Frannie Jj 1980  CONFIDENTIALTY NOTICE: This fax transmission is intended only for the addressee  It contains information that is legally privileged,  confidential or otherwise protected from use or disclosure  If you are not the intended recipient, you are strictly prohibited from reviewing,  disclosing, copying using or disseminating any of this information or taking any action in reliance on or regarding this information  If you have  received this fax in error, please notify us immediately by telephone so that we can arrange for its return to us  Page: 1  2  Call Id: [id number]  Health Call  Standard Call Report  Health Call  Patient Name: Ashley Wright  Gender: Female  : 1980  Age: 44 Y 6 M 8 D  Return Phone  Number: [phone] (Home)  Address: [address] [place] [address]  City/State/Zip: [address] [place] [address]  Practice Name: [facility],Second [facility]  Practice Charged:  Physician:  [facility] Name:  Relationship To  Patient: Self  Return Phone Number: [phone] (Home)  Presenting Problem: "I need my test strips refilled "  Service Type: Prescription Refills  Charged Service 1: N/A  Pharmacy Name and  Number:  Nurse Assessment  Nurse: Carlos A Chicas Date/Time: 2019 1:16:29 PM  Type of assessment required:  ---Telephone Order (Meds)  For MD Signature? Date signed:  ---Yes  Date and Time of TO:  ---2019 at 1300  Prescribing doctor:  ---Dr Lucita Irizarry  Medication ordered:  ---ACCU Check Test Strips  Dose:  ---n/a  Route  ---n/a  Frequency:  ---One test strip to test blood glucose twice a day  Amount dispensed:  ---One box  Refills:  ---2 refills  Frannie Jj 1980  CONFIDENTIALTY NOTICE: This fax transmission is intended only for the addressee  It contains information that is legally privileged,  confidential or otherwise protected from use or disclosure   If you are not the intended recipient, you are strictly prohibited from reviewing,  disclosing, copying using or disseminating any of this information or taking any action in reliance on or regarding this information  If you have  received this fax in error, please notify us immediately by telephone so that we can arrange for its return to us  Page: 2 of 2  Call Id: [id number]  Nurse Assessment  Pharmacy and phone number:  ---[facility] [phone]  Date and time prescription called to pharmacy:  ---11/30/2019 at 36  Telephone order taken and read back by RN?  ---Yes  Protocols  Protocol Title Nurse Date/Time  Disp  Time Disposition Final User  11/30/2019 9:47:23 AM Send to [address] [place] [address] 45, [id number] [facility]  11/30/2019 1:25:18 PM Refill Complete Yes Lj Ferguson RN, Neo Gautam  Comments  User: Yvette Morales RN Date/Time: 11/30/2019 1:26:18 PM  Spoke with Dr Avel ramirez and received permission to call in verbal order for patients ACCU check test strips  Test strips called  in successfully and patient is aware

## 2019-12-26 ENCOUNTER — OFFICE VISIT (OUTPATIENT)
Dept: FAMILY MEDICINE CLINIC | Facility: CLINIC | Age: 39
End: 2019-12-26
Payer: COMMERCIAL

## 2019-12-26 VITALS
DIASTOLIC BLOOD PRESSURE: 78 MMHG | HEIGHT: 67 IN | OXYGEN SATURATION: 98 % | WEIGHT: 276 LBS | BODY MASS INDEX: 43.32 KG/M2 | TEMPERATURE: 100 F | RESPIRATION RATE: 14 BRPM | HEART RATE: 82 BPM | SYSTOLIC BLOOD PRESSURE: 128 MMHG

## 2019-12-26 DIAGNOSIS — K59.00 CONSTIPATION, UNSPECIFIED CONSTIPATION TYPE: Primary | ICD-10-CM

## 2019-12-26 DIAGNOSIS — D50.0 IRON DEFICIENCY ANEMIA DUE TO CHRONIC BLOOD LOSS: ICD-10-CM

## 2019-12-26 DIAGNOSIS — K62.89 RECTAL PAIN: ICD-10-CM

## 2019-12-26 PROCEDURE — 3008F BODY MASS INDEX DOCD: CPT | Performed by: FAMILY MEDICINE

## 2019-12-26 PROCEDURE — 99214 OFFICE O/P EST MOD 30 MIN: CPT | Performed by: FAMILY MEDICINE

## 2019-12-26 PROCEDURE — 1036F TOBACCO NON-USER: CPT | Performed by: FAMILY MEDICINE

## 2019-12-26 RX ORDER — BLOOD-GLUCOSE METER
EACH MISCELLANEOUS DAILY
Refills: 0 | COMMUNITY
Start: 2019-11-06

## 2019-12-26 NOTE — ASSESSMENT & PLAN NOTE
Advised to take Colace 100 mg daily, Metamucil 1 tbsp daily and she will also take Pepcid 20 mg daily to reduce the gas, the constipation probably is causing slight fissure which is bleeding on wiping , Hemoccult test is negative  Follow-up in 1 week if not better

## 2019-12-26 NOTE — PROGRESS NOTES
Assessment/Plan:    Problem List Items Addressed This Visit        Other    Iron deficiency anemia due to chronic blood loss     She is on iron due to iron deficiency anemia, she will continue the same and advised to take Colace regularly to avoid constipation because that can lead to bleeding if she is straining         Constipation - Primary     Advised to take Colace 100 mg daily, Metamucil 1 tbsp daily and she will also take Pepcid 20 mg daily to reduce the gas, the constipation probably is causing slight fissure which is bleeding on wiping , Hemoccult test is negative  Follow-up in 1 week if not better         Rectal pain     Feeling lot of gas indigestion and she has some constipation advised to use stool softeners and Pepcid and follow-up                Chief Complaint   Patient presents with    Constipation         Subjective:   Patient ID: Mira Estrada is a 44 y o  female  SHE COMPLAINS OF HAVING DISCOMFORT AT HER ANUS WHEN SHE FEELS THE GAS IS COMING OUT BUT SHE HAS NO PAIN WHEN SHE HAS POPPING, SHE SAYS SINCE SHE HAS BEEN ON IRON SINCE AUGUST SHE HAS SLIGHTLY ON CONSTIPATED SIDE, BUT SHE HAS NO BLOOD IN HER STOOL, SHE HAD HER BOWEL MOVEMENT THIS MORNING WHICH WAS NORMAL  SHE SAYS WHEN SHE WIPES SHE SEE BLOOD ON THE TOILET PAPER, SHE HAS NO HISTORY OF HEMORRHOIDS  ALSO FEELS DISCOMFORT AND BLOATED IN HER ABDOMEN, FOR LAST FEW DAYS  HER PERIOD ENDED LAST WEEK,  SINCE YESTERDAY SHE HAS BEEN FEELING NAUSEA BUT SHE HAS NO VOMITING ,  HER APPETITE IS NORMAL  SHE DOES NOT HAVE ANY ACID 80 KIND OF FEELING IN HER ABDOMEN AND NO HEARTBURN AND NO ACID REFLUX  She is not sexually active and she is or hormones to reduce the blood loss through her heavy periods as she was getting anemic      Review of Systems   Constitutional: Negative for activity change, appetite change, chills, diaphoresis, fatigue, fever and unexpected weight change     HENT: Negative for congestion, dental problem, ear discharge, ear pain, facial swelling, hearing loss, mouth sores, nosebleeds, postnasal drip, rhinorrhea, sinus pressure, sinus pain, sneezing, sore throat, trouble swallowing and voice change  Eyes: Negative for photophobia, pain, discharge, redness and itching  Respiratory: Negative for cough, chest tightness, shortness of breath and wheezing  Cardiovascular: Negative for chest pain, palpitations and leg swelling  Gastrointestinal: Positive for constipation, nausea and rectal pain  Negative for abdominal distention, abdominal pain, blood in stool and diarrhea  Endocrine: Negative for cold intolerance, heat intolerance, polydipsia, polyphagia and polyuria  Genitourinary: Negative for dysuria, flank pain, frequency, hematuria and urgency  Musculoskeletal: Negative for arthralgias, back pain, myalgias and neck pain  Skin: Negative for color change and pallor  Allergic/Immunologic: Negative for environmental allergies and food allergies  Neurological: Negative for dizziness, weakness, light-headedness, numbness and headaches  Hematological: Negative for adenopathy  Does not bruise/bleed easily  Psychiatric/Behavioral: Negative for behavioral problems, sleep disturbance and suicidal ideas  The patient is not nervous/anxious  Objective:  Physical Exam   Constitutional: She is oriented to person, place, and time  She appears well-developed and well-nourished  HENT:   Head: Normocephalic and atraumatic  Nose: Nose normal    Mouth/Throat: Oropharynx is clear and moist  No oropharyngeal exudate  Eyes: Conjunctivae and EOM are normal  Right eye exhibits no discharge  Left eye exhibits no discharge  No scleral icterus  Neck: Normal range of motion  Neck supple  Cardiovascular: Normal rate, regular rhythm and normal heart sounds  No murmur heard  Pulmonary/Chest: Effort normal and breath sounds normal  No respiratory distress  She has no wheezes  She has no rales  Abdominal: Soft   Bowel sounds are normal  She exhibits no distension and no mass  There is no tenderness  There is no rebound  Genitourinary: Rectal exam shows guaiac negative stool  Musculoskeletal: Normal range of motion  She exhibits no edema  Neurological: She is alert and oriented to person, place, and time  She has normal reflexes  No cranial nerve deficit  Skin: Skin is warm  No rash noted  No erythema  No pallor  Psychiatric: She has a normal mood and affect  Her behavior is normal  Judgment and thought content normal    Nursing note and vitals reviewed           Past Surgical History:   Procedure Laterality Date    NO PAST SURGERIES         Family History   Problem Relation Age of Onset    Hypertension Mother     Lung cancer Maternal Grandfather          Current Outpatient Medications:     Blood Glucose Monitoring Suppl (ACCU-CHEK GUIDE) w/Device KIT, daily Use as directed, Disp: , Rfl: 0    FEROSUL 325 (65 Fe) MG tablet, Take 1 tablet (325 mg total) by mouth 2 (two) times a day with meals, Disp: 180 tablet, Rfl: 1    glimepiride (AMARYL) 2 mg tablet, Take 1 tablet (2 mg total) by mouth daily before breakfast, Disp: 90 tablet, Rfl: 1    medroxyPROGESTERone (PROVERA) 10 mg tablet, Take 1 tablet (10 mg total) by mouth daily for 10 days, Disp: 10 tablet, Rfl: 0    metFORMIN (GLUCOPHAGE) 500 mg tablet, Take 1 tablet (500 mg total) by mouth 2 (two) times a day with meals, Disp: 180 tablet, Rfl: 1    ONETOUCH DELICA LANCETS FINE MISC, Check blood sugar twice a day, newly diagnosed diabetic, Disp: 100 each, Rfl: 2    ONETOUCH VERIO test strip, use 1 TEST STRIP to TEST BLOOD SUGAR twice a day, Disp: 100 each, Rfl: 3    sertraline (ZOLOFT) 100 mg tablet, Take 2 tablets (200 mg total) by mouth daily, Disp: 180 tablet, Rfl: 1    No Known Allergies    Vitals:    12/26/19 1515 12/26/19 1528   BP: 128/78    BP Location: Left arm    Patient Position: Sitting    Cuff Size: Large    Pulse: (!) 108 82   Resp: 14    Temp: 100 °F (37 8 °C)    SpO2: 98%    Weight: 125 kg (276 lb)    Height: 5' 7" (1 702 m)

## 2019-12-26 NOTE — ASSESSMENT & PLAN NOTE
She is on iron due to iron deficiency anemia, she will continue the same and advised to take Colace regularly to avoid constipation because that can lead to bleeding if she is straining

## 2019-12-26 NOTE — ASSESSMENT & PLAN NOTE
Feeling lot of gas indigestion and she has some constipation advised to use stool softeners and Pepcid and follow-up

## 2020-01-02 ENCOUNTER — TELEPHONE (OUTPATIENT)
Dept: FAMILY MEDICINE CLINIC | Facility: CLINIC | Age: 40
End: 2020-01-02

## 2020-01-02 DIAGNOSIS — E11.65 TYPE 2 DIABETES MELLITUS WITH HYPERGLYCEMIA, WITHOUT LONG-TERM CURRENT USE OF INSULIN (HCC): Primary | ICD-10-CM

## 2020-01-02 NOTE — TELEPHONE ENCOUNTER
Patient has a new device and needs lancets sent to the pharmacy   [facility] [address]

## 2020-02-15 DIAGNOSIS — E11.65 TYPE 2 DIABETES MELLITUS WITH HYPERGLYCEMIA, WITHOUT LONG-TERM CURRENT USE OF INSULIN (HCC): ICD-10-CM

## 2020-02-17 RX ORDER — GLIMEPIRIDE 2 MG/1
TABLET ORAL
Qty: 90 TABLET | Refills: 1 | Status: SHIPPED | OUTPATIENT
Start: 2020-02-17 | End: 2020-03-11

## 2020-02-19 ENCOUNTER — OFFICE VISIT (OUTPATIENT)
Dept: OBGYN CLINIC | Facility: CLINIC | Age: 40
End: 2020-02-19
Payer: COMMERCIAL

## 2020-02-19 VITALS — SYSTOLIC BLOOD PRESSURE: 118 MMHG | DIASTOLIC BLOOD PRESSURE: 78 MMHG | WEIGHT: 263 LBS | BODY MASS INDEX: 41.19 KG/M2

## 2020-02-19 DIAGNOSIS — Z30.41 ENCOUNTER FOR SURVEILLANCE OF CONTRACEPTIVE PILLS: Primary | ICD-10-CM

## 2020-02-19 DIAGNOSIS — N93.9 ABNORMAL UTERINE BLEEDING: ICD-10-CM

## 2020-02-19 DIAGNOSIS — E11.65 TYPE 2 DIABETES MELLITUS WITH HYPERGLYCEMIA, WITHOUT LONG-TERM CURRENT USE OF INSULIN (HCC): ICD-10-CM

## 2020-02-19 PROBLEM — E66.9 OBESITY: Status: ACTIVE | Noted: 2017-10-16

## 2020-02-19 PROBLEM — R53.83 FATIGUE: Status: ACTIVE | Noted: 2017-10-16

## 2020-02-19 PROCEDURE — 1036F TOBACCO NON-USER: CPT | Performed by: OBSTETRICS & GYNECOLOGY

## 2020-02-19 PROCEDURE — 99213 OFFICE O/P EST LOW 20 MIN: CPT | Performed by: OBSTETRICS & GYNECOLOGY

## 2020-02-19 PROCEDURE — 3074F SYST BP LT 130 MM HG: CPT | Performed by: OBSTETRICS & GYNECOLOGY

## 2020-02-19 PROCEDURE — 3078F DIAST BP <80 MM HG: CPT | Performed by: OBSTETRICS & GYNECOLOGY

## 2020-02-19 RX ORDER — DOCUSATE SODIUM 100 MG/1
100 CAPSULE, LIQUID FILLED ORAL 2 TIMES DAILY
COMMUNITY
End: 2021-03-12 | Stop reason: HOSPADM

## 2020-02-19 RX ORDER — ACETAMINOPHEN AND CODEINE PHOSPHATE 120; 12 MG/5ML; MG/5ML
1 SOLUTION ORAL DAILY
Qty: 84 TABLET | Refills: 3 | Status: SHIPPED | OUTPATIENT
Start: 2020-02-19 | End: 2020-04-16 | Stop reason: HOSPADM

## 2020-02-19 RX ORDER — ACETAMINOPHEN AND CODEINE PHOSPHATE 120; 12 MG/5ML; MG/5ML
1 SOLUTION ORAL DAILY
COMMUNITY
End: 2020-02-19 | Stop reason: SDUPTHER

## 2020-02-19 NOTE — PROGRESS NOTES
Assessment   Diagnoses and all orders for this visit:    Encounter for surveillance of contraceptive pills  Abnormal uterine bleeding  -     norethindrone (MICRONOR) 0 35 MG tablet; Take 1 tablet (0 35 mg total) by mouth daily      Plan  44 y o  female continuing oral progesterone-only contraceptive, no contraindications  Return to office in 3 month(s) for annual exam        Codie Moreno is a 44 y o  female who presents for contraception follow up  Her current contraception is oral progesterone-only contraceptive  The patient is happy with this method  She denies headache, nausea, abnormal bleeding and abnormal discharge  The patient is not sexually active  She is using birth control for cycle control  Her period is significantly better on the progesterone only pill  She denies any side effects and wishes to continue this method  Last pap 2015       Patient Active Problem List   Diagnosis    Depression    Iron deficiency anemia due to chronic blood loss    Type 2 diabetes mellitus with hyperglycemia, without long-term current use of insulin ([facility])    Need for vaccination for pneumococcus    Constipation    Rectal pain    Suicide and self-inflicted injury ([facility] [address] )    Obesity    Hypertension, essential    Fatigue       Past Medical History:   Diagnosis Date    Anxiety     Depression     Diabetes [facility]        Past Surgical History:   Procedure Laterality Date    NO PAST SURGERIES         Family History   Problem Relation Age of Onset    Hypertension Mother     Lung cancer Maternal Grandfather        Social History     Socioeconomic History    Marital status: Single     Spouse name: Not on file    Number of children: Not on file    Years of education: Not on file    Highest education level: Not on file   Occupational History    Occupation: Day care worker   Social Needs    Financial resource strain: Not on file    Food insecurity:     Worry: Not on file     Inability: Not on file  Transportation needs:     Medical: Not on file     Non-medical: Not on file   Tobacco Use    Smoking status: Never Smoker    Smokeless tobacco: Never Used   Substance and Sexual Activity    Alcohol use: Yes     Comment: SOCIAL     Drug use: No    Sexual activity: Never   Lifestyle    Physical activity:     Days per week: Not on file     Minutes per session: Not on file    Stress: Not on file   Relationships    Social connections:     Talks on phone: Not on file     Gets together: Not on file     Attends Zoroastrian service: Not on file     Active member of club or organization: Not on file     Attends meetings of clubs or organizations: Not on file     Relationship status: Not on file    Intimate partner violence:     Fear of current or ex partner: Not on file     Emotionally abused: Not on file     Physically abused: Not on file     Forced sexual activity: Not on file   Other Topics Concern    Not on file   Social History Narrative    Not on file          Current Outpatient Medications:     Blood Glucose Monitoring Suppl (ACCU-CHEK GUIDE) w/Device KIT, daily Use as directed, Disp: , Rfl: 0    docusate sodium (COLACE) 100 mg capsule, Take 100 mg by mouth 2 (two) times a day, Disp: , Rfl:     FEROSUL 325 (65 Fe) MG tablet, Take 1 tablet (325 mg total) by mouth 2 (two) times a day with meals, Disp: 180 tablet, Rfl: 1    glimepiride (AMARYL) 2 mg tablet, take 1 tablet by mouth daily before breakfast, Disp: 90 tablet, Rfl: 1    Lancets (ACCU-CHEK SOFT TOUCH) lancets, Use as instructed once daily, Disp: 100 each, Rfl: 1    metFORMIN (GLUCOPHAGE) 500 mg tablet, take 1 tablet by mouth twice a day with meals, Disp: 180 tablet, Rfl: 1    norethindrone (MICRONOR) 0 35 MG tablet, Take 1 tablet by mouth daily, Disp: , Rfl:     sertraline (ZOLOFT) 100 mg tablet, Take 2 tablets (200 mg total) by mouth daily, Disp: 180 tablet, Rfl: 1    medroxyPROGESTERone (PROVERA) 10 mg tablet, Take 1 tablet (10 mg total) by mouth daily for 10 days (Patient not taking: Reported on 2/19/2020), Disp: 10 tablet, Rfl: 0    No Known Allergies    Review of Systems  Review of Systems   Constitutional: Negative for unexpected weight change  Respiratory: Negative for shortness of breath  Cardiovascular: Negative for chest pain  Gastrointestinal: Negative for abdominal distention, abdominal pain, blood in stool, constipation, nausea and vomiting  Genitourinary: Negative for difficulty urinating, dysuria, frequency, menstrual problem, pelvic pain, vaginal bleeding and vaginal discharge  Neurological: Negative for headaches  Objective     /78   Wt 119 kg (263 lb)   BMI 41 19 kg/m²     Physical Exam   Constitutional: She is oriented to person, place, and time  She appears well-developed and well-nourished  Pulmonary/Chest: Effort normal  No respiratory distress  Neurological: She is alert and oriented to person, place, and time  Psychiatric: She has a normal mood and affect  Her behavior is normal    Vitals reviewed

## 2020-02-25 DIAGNOSIS — E11.65 TYPE 2 DIABETES MELLITUS WITH HYPERGLYCEMIA, WITHOUT LONG-TERM CURRENT USE OF INSULIN (HCC): Primary | ICD-10-CM

## 2020-03-02 ENCOUNTER — TELEPHONE (OUTPATIENT)
Dept: OBGYN CLINIC | Facility: CLINIC | Age: 40
End: 2020-03-02

## 2020-03-02 NOTE — TELEPHONE ENCOUNTER
Started bleeding heavy this morning states she saturated 2 pads in one hour along with passing a plum size clot  She states bleeding has slowed down  Slightly dizzy this morning but feeling much better now  She is taking Micronor and has no idea where she is in the pack  States she does taking pill daily around the same time (within 1 hr) Advised Motrin 600 mg every 6 hours with food for 48-72 hours  Call if saturating a pad an hour, passing large clots or becomes symptomatic  Routing to provider for further advice

## 2020-03-03 ENCOUNTER — OFFICE VISIT (OUTPATIENT)
Dept: OBGYN CLINIC | Facility: CLINIC | Age: 40
End: 2020-03-03
Payer: COMMERCIAL

## 2020-03-03 VITALS
HEART RATE: 96 BPM | HEIGHT: 67 IN | DIASTOLIC BLOOD PRESSURE: 80 MMHG | SYSTOLIC BLOOD PRESSURE: 118 MMHG | WEIGHT: 267.2 LBS | BODY MASS INDEX: 41.94 KG/M2

## 2020-03-03 DIAGNOSIS — N92.6 IRREGULAR BLEEDING: Primary | ICD-10-CM

## 2020-03-03 PROCEDURE — 3079F DIAST BP 80-89 MM HG: CPT | Performed by: NURSE PRACTITIONER

## 2020-03-03 PROCEDURE — 3008F BODY MASS INDEX DOCD: CPT | Performed by: NURSE PRACTITIONER

## 2020-03-03 PROCEDURE — 99214 OFFICE O/P EST MOD 30 MIN: CPT | Performed by: NURSE PRACTITIONER

## 2020-03-03 PROCEDURE — 1036F TOBACCO NON-USER: CPT | Performed by: NURSE PRACTITIONER

## 2020-03-03 PROCEDURE — 3074F SYST BP LT 130 MM HG: CPT | Performed by: NURSE PRACTITIONER

## 2020-03-03 NOTE — PROGRESS NOTES
Assessment/Plan:     Complete previously order thyroid study-have forwarded here  Records release signed for last nights records at [facility] ER visit  Return to office for endometrial biopsy; premedicate with tylenol and eat at least one hour prior; also needs annual visit  Call with any worsening of symptoms  Continue micronor and motrin as directed      Diagnoses and all orders for this visit:    Irregular bleeding              Subjective:      Patient ID: Daniela Allan is a 44 y o  female  Daniela Allan is a 44 y o  female who is here today for a problem visit  She was evaluated at [facility] ER yesterday for heavy vaginal bleeding that started earlier that am  Admits to changing a pad per hour with large clots  It remained heavy over night and has no vaginal bleeding today  She believes her Hgb yesterday was around 12  We do not currently have any labs or records from yesterdays ER visit  She is taking micronor daily since her visit with Dr Savage Suresh 10/19  Monthly menses x 7 days with mod to heavy flow  Menses is acceptable  Daniela Allan has never had vaginally penetrative sex  Not currently dating  TV US Report reviewed from [facility]  Done 9/13/19  Uterus: 10 6 x 6 4 x 5 9cm  No evidence of fibroids  Endometrium: 24 mm,  Ovaries: R is normal, no masses  L is normal, no masses with what appears to be an adjacent hydrosalpinx  She does not believe she had a pelvic US yesterday  Walks daily for exercise  Type 2 DM has been stable  States she lost 40 lbs since 8/19  She never completed her previously ordered thyroid study  The following portions of the patient's history were reviewed and updated as appropriate: allergies, current medications, past family history, past medical history, past social history, past surgical history and problem list     Review of Systems   Constitutional: Negative  Eyes: Negative for visual disturbance     Respiratory: Negative for chest tightness and shortness of breath  Cardiovascular: Negative for chest pain, palpitations and leg swelling  Gastrointestinal: Negative for abdominal pain, constipation, diarrhea, nausea and vomiting  Genitourinary: Positive for menstrual problem  Negative for difficulty urinating, dysuria, frequency, hematuria, pelvic pain, urgency, vaginal bleeding, vaginal discharge and vaginal pain  Skin: Negative  Neurological: Negative for weakness, light-headedness and headaches  Psychiatric/Behavioral: Negative  All other systems reviewed and are negative  Objective:      /80 (BP Location: Left arm, Patient Position: Sitting, Cuff Size: Standard)   Pulse 96   Ht 5' 7" (1 702 m)   Wt 121 kg (267 lb 3 2 oz)   LMP 02/20/2020   BMI 41 85 kg/m²          Physical Exam   Constitutional: She is oriented to person, place, and time  She appears well-developed and well-nourished  Genitourinary: Uterus normal  Rectal exam shows no external hemorrhoid  Pelvic exam was performed with patient supine  No labial fusion  There is no rash, tenderness, lesion or injury on the right labia  There is no rash, tenderness, lesion or injury on the left labia  Cervix exhibits no motion tenderness, no discharge and no friability  Right adnexum displays no mass, no tenderness and no fullness  Left adnexum displays no mass, no tenderness and no fullness  There is bleeding (scant) in the vagina  No erythema or tenderness in the vagina  No foreign body in the vagina  No signs of injury around the vagina  No vaginal discharge found  Genitourinary Comments: Physical exam limited by habitus   Musculoskeletal: Normal range of motion  Lymphadenopathy: No inguinal adenopathy noted on the right or left side  Right: No inguinal adenopathy present  Left: No inguinal adenopathy present  Neurological: She is alert and oriented to person, place, and time  Skin: Skin is warm and dry     Psychiatric: She has a normal mood and affect  Nursing note and vitals reviewed

## 2020-03-03 NOTE — PATIENT INSTRUCTIONS
Complete previously order thyroid study-have forwarded here  Records release signed for last nights records at [facility]  Return to office for endometrial biopsy; premedicate with tylenol and eat at least one hour prior; also needs annual visit  Call with any worsening of symptoms     Continue micronor and motrin as directed

## 2020-03-04 DIAGNOSIS — F32.A DEPRESSION, UNSPECIFIED DEPRESSION TYPE: ICD-10-CM

## 2020-03-04 DIAGNOSIS — E55.9 VITAMIN D DEFICIENCY: Primary | ICD-10-CM

## 2020-03-04 LAB
25(OH)D3 SERPL-MCNC: 9 NG/ML (ref 30–100)
TSH SERPL-ACNC: 1.95 MIU/L

## 2020-03-04 RX ORDER — SERTRALINE HYDROCHLORIDE 100 MG/1
TABLET, FILM COATED ORAL
Qty: 180 TABLET | Refills: 1 | Status: SHIPPED | OUTPATIENT
Start: 2020-03-04 | End: 2020-04-06

## 2020-03-05 ENCOUNTER — TELEPHONE (OUTPATIENT)
Dept: OBGYN CLINIC | Facility: CLINIC | Age: 40
End: 2020-03-05

## 2020-03-05 NOTE — TELEPHONE ENCOUNTER
Spoke with patient who states she had heavy bleeding needing to change a saturated pad 3 times in 4 hours last night  She believes she passed out on her bathroom floor  She did not injure herself  She had not had any vaginal bleeding since 10 pm last night and none now

## 2020-03-05 NOTE — TELEPHONE ENCOUNTER
Patient called stating she fainting last night in the bathroom due to heavy bleeding  She was fine all day until night time  She went to the bathroom and noticed she was bleeding a lot and then passed out for a few minutes  She was by herself, she thinks it was less than 5 minutes  Her mother and brother were home but unaware of episode at the time  Frannie took Advil as directed and the bleeding stopped  She is calling today requesting an appointment ASAP to figure out why this is happening and would like it to stop

## 2020-03-06 NOTE — TELEPHONE ENCOUNTER
Spoke with patient who is no longer bleeding  Normal glucose this am  Will check again this Pm  Aware that any heavy bleeding saturating a pad per hour warrants investigation  Call office if regular hours; overnight-consider going to the ER  She is scheduled for an EBX next week  Continue with micronor as directed

## 2020-03-09 ENCOUNTER — TELEPHONE (OUTPATIENT)
Dept: OBGYN CLINIC | Facility: CLINIC | Age: 40
End: 2020-03-09

## 2020-03-09 NOTE — TELEPHONE ENCOUNTER
Ranjan called in this morning stating this past weekend she felt dizzy and weak once she would stand up quickly  She said her heart rate would begin to race and would feel dizzy  She has only been bleeding at night  She would like to speak with you again about a solution  Informed her that Cat Radha is not in this office today, will get message over to her and that she has an appointment with you Thursday

## 2020-03-10 ENCOUNTER — PROCEDURE VISIT (OUTPATIENT)
Dept: OBGYN CLINIC | Facility: CLINIC | Age: 40
End: 2020-03-10
Payer: COMMERCIAL

## 2020-03-10 VITALS
HEART RATE: 106 BPM | DIASTOLIC BLOOD PRESSURE: 70 MMHG | SYSTOLIC BLOOD PRESSURE: 138 MMHG | WEIGHT: 265.6 LBS | BODY MASS INDEX: 41.6 KG/M2

## 2020-03-10 DIAGNOSIS — N88.8 MASS OF CERVIX: ICD-10-CM

## 2020-03-10 DIAGNOSIS — N92.1 MENOMETRORRHAGIA: Primary | ICD-10-CM

## 2020-03-10 PROCEDURE — 3075F SYST BP GE 130 - 139MM HG: CPT | Performed by: NURSE PRACTITIONER

## 2020-03-10 PROCEDURE — 1036F TOBACCO NON-USER: CPT | Performed by: NURSE PRACTITIONER

## 2020-03-10 PROCEDURE — 99214 OFFICE O/P EST MOD 30 MIN: CPT | Performed by: NURSE PRACTITIONER

## 2020-03-10 PROCEDURE — 3078F DIAST BP <80 MM HG: CPT | Performed by: NURSE PRACTITIONER

## 2020-03-10 NOTE — PATIENT INSTRUCTIONS
Instructed to proceed to ER with vaginal bleeding saturating a pad per hour  Req given for CBC with diff and encouraged to complete today

## 2020-03-10 NOTE — TELEPHONE ENCOUNTER
Patient called back  Upset she has not heard back yet  States she is very weak  Still bleeding heavily  Was in the ER  Is scheduled for the biopsy on Thursday, but would like to speak with you   Best call back number is [phone]

## 2020-03-10 NOTE — TELEPHONE ENCOUNTER
Patient was called yesterday with no answer around 1800  Called today and scheduled an appt  Today at 2:20- for EBX and req for follow up CBC

## 2020-03-10 NOTE — PROGRESS NOTES
Endometrial biopsy  Date/Time: 3/10/2020 2:30 PM  Performed by: CLAUDIA Quintanilla  Authorized by: CLAUDIA Quintanilla     Consent:     Consent obtained:  Verbal and written    Consent given by:  Patient    Procedural risks discussed:  Bleeding, failure rate, infection, repeat procedure, possible loss of function, possible continued pain and damage to other organs    Patient questions answered: yes      Patient agrees, verbalizes understanding, and wants to proceed: yes      Educational handouts given: no      Instructions and paperwork completed: yes    Indication:     Indications: Other disorder of menstruation and other abnormal bleeding from female genital tract    Pre-procedure:     Pre-procedure timeout performed: yes      Premeds:  Ibuprofen  Procedure:     A bivalve speculum was placed in the vagina: yes      Cervix cleaned and prepped: yes    Comments:      Endometrial biopsy not performed  She appears pale today  Upon visualization of her cervix,  1 5-2 cm firm bleeding (oozing) mass protruding through cervical os  Non tender, movable/partially reducible  Monsels applied with hemostasis noted  Discussed finding with patient and planned follow up with Dr Regan Gray 3/13/2020 at [address] [place] [address]  Instructed to proceed to ER with vaginal bleeding saturating a pad per hour  Req given for CBC with diff and encouraged to complete today

## 2020-03-11 ENCOUNTER — TELEPHONE (OUTPATIENT)
Dept: OBGYN CLINIC | Facility: CLINIC | Age: 40
End: 2020-03-11

## 2020-03-11 DIAGNOSIS — E11.65 TYPE 2 DIABETES MELLITUS WITH HYPERGLYCEMIA, WITHOUT LONG-TERM CURRENT USE OF INSULIN (HCC): ICD-10-CM

## 2020-03-11 PROBLEM — D50.0 IRON DEFICIENCY ANEMIA DUE TO CHRONIC BLOOD LOSS: Status: ACTIVE | Noted: 2020-03-11

## 2020-03-11 LAB
BASOPHILS # BLD AUTO: 0 CELLS/UL (ref 0–200)
BASOPHILS NFR BLD AUTO: 0 %
EOSINOPHIL # BLD AUTO: 246 CELLS/UL (ref 15–500)
EOSINOPHIL NFR BLD AUTO: 3 %
ERYTHROCYTE [DISTWIDTH] IN BLOOD BY AUTOMATED COUNT: 15.9 % (ref 11–15)
HCT VFR BLD AUTO: 20.3 % (ref 35–45)
HGB BLD-MCNC: 6.5 G/DL (ref 11.7–15.5)
LYMPHOCYTES # BLD MANUAL: 656 CELLS/UL (ref 850–3900)
LYMPHOCYTES NFR BLD AUTO: 8 %
MCH RBC QN AUTO: 28 PG (ref 27–33)
MCHC RBC AUTO-ENTMCNC: 32 G/DL (ref 32–36)
MCV RBC AUTO: 87.5 FL (ref 80–100)
METAMYELOCYTES # BLD: 82 CELLS/UL
METAMYELOCYTES NFR BLD MANUAL: 1 %
MONOCYTES # BLD AUTO: 246 CELLS/UL (ref 200–950)
MONOCYTES NFR BLD AUTO: 3 %
MYELOCYTES # BLD: 82 CELLS/UL
MYELOCYTES NFR BLD MANUAL: 1 %
NEUTROPHILS # BLD AUTO: 6806 CELLS/UL (ref 1500–7800)
NEUTROPHILS NFR BLD AUTO: 83 %
NEUTS BAND # BLD: 82 CELLS/UL (ref 0–750)
NEUTS BAND NFR BLD MANUAL: 1 %
NRBC # BLD: 164 CELLS/UL
NRBC BLD-RTO: 2 /100 WBC
PLATELET # BLD AUTO: 294 THOUSAND/UL (ref 140–400)
PMV BLD REES-ECKER: 11 FL (ref 7.5–12.5)
RBC # BLD AUTO: 2.32 MILLION/UL (ref 3.8–5.1)
WBC # BLD AUTO: 8.2 THOUSAND/UL (ref 3.8–10.8)

## 2020-03-11 RX ORDER — SODIUM CHLORIDE 9 MG/ML
20 INJECTION, SOLUTION INTRAVENOUS ONCE
Status: CANCELLED | OUTPATIENT
Start: 2020-03-14

## 2020-03-11 RX ORDER — GLIMEPIRIDE 2 MG/1
TABLET ORAL
Qty: 90 TABLET | Refills: 1 | Status: SHIPPED | OUTPATIENT
Start: 2020-03-11 | End: 2020-06-09 | Stop reason: ALTCHOICE

## 2020-03-11 NOTE — PROGRESS NOTES
Spoke with the patient over the phone about her results  Discussed recommendation for blood transfusion given her acute symptomatic anemia  The patient understands the risks and benefits of a blood transfusion including febrile non-hemolytic reaction, allergic reaction, hemolytic transfusion reaction, bacterial contamination, transfusion related acute lung injury, 1:1 5 million risk of HIV, 1:1 million risk of HCV, 1:280,000 risk of HBV and rarely, death  Patient stated understanding and wishes to proceed with blood transfusion if deemed appropriate

## 2020-03-11 NOTE — TELEPHONE ENCOUNTER
[address] - critical hgb 6 5  Dr Janay Jain and Niya aware and are talking to patient  Pt being set up with blood transfusion

## 2020-03-12 ENCOUNTER — APPOINTMENT (OUTPATIENT)
Dept: LAB | Facility: HOSPITAL | Age: 40
End: 2020-03-12
Payer: COMMERCIAL

## 2020-03-12 ENCOUNTER — TELEPHONE (OUTPATIENT)
Dept: OBGYN CLINIC | Facility: CLINIC | Age: 40
End: 2020-03-12

## 2020-03-12 DIAGNOSIS — D50.0 IRON DEFICIENCY ANEMIA DUE TO CHRONIC BLOOD LOSS: ICD-10-CM

## 2020-03-12 DIAGNOSIS — N92.1 MENOMETRORRHAGIA: ICD-10-CM

## 2020-03-12 LAB
ABO GROUP BLD: NORMAL
ANISOCYTOSIS BLD QL SMEAR: PRESENT
BASOPHILS # BLD MANUAL: 0 THOUSAND/UL (ref 0–0.1)
BASOPHILS NFR MAR MANUAL: 0 % (ref 0–1)
BLD GP AB SCN SERPL QL: NEGATIVE
CREAT UR-MCNC: 256 MG/DL
DACRYOCYTES BLD QL SMEAR: PRESENT
EOSINOPHIL # BLD MANUAL: 0.15 THOUSAND/UL (ref 0–0.4)
EOSINOPHIL NFR BLD MANUAL: 2 % (ref 0–6)
ERYTHROCYTE [DISTWIDTH] IN BLOOD BY AUTOMATED COUNT: 19.6 % (ref 11.6–15.1)
HCT VFR BLD AUTO: 24.4 % (ref 34.8–46.1)
HELMET CELLS BLD QL SMEAR: PRESENT
HGB BLD-MCNC: 7 G/DL (ref 11.5–15.4)
LYMPHOCYTES # BLD AUTO: 0.6 THOUSAND/UL (ref 0.6–4.47)
LYMPHOCYTES # BLD AUTO: 8 % (ref 14–44)
MCH RBC QN AUTO: 27.1 PG (ref 26.8–34.3)
MCHC RBC AUTO-ENTMCNC: 28.7 G/DL (ref 31.4–37.4)
MCV RBC AUTO: 95 FL (ref 82–98)
METAMYELOCYTES NFR BLD MANUAL: 1 % (ref 0–1)
MICROALBUMIN UR-MCNC: 57 MG/L (ref 0–20)
MICROALBUMIN/CREAT 24H UR: 22 MG/G CREATININE (ref 0–30)
MICROCYTES BLD QL AUTO: PRESENT
MONOCYTES # BLD AUTO: 0.08 THOUSAND/UL (ref 0–1.22)
MONOCYTES NFR BLD: 1 % (ref 4–12)
MYELOCYTES NFR BLD MANUAL: 1 % (ref 0–1)
NEUTROPHILS # BLD MANUAL: 6.53 THOUSAND/UL (ref 1.85–7.62)
NEUTS SEG NFR BLD AUTO: 87 % (ref 43–75)
NRBC BLD AUTO-RTO: 2 /100 WBCS
OVALOCYTES BLD QL SMEAR: PRESENT
PLATELET # BLD AUTO: 273 THOUSANDS/UL (ref 149–390)
PLATELET BLD QL SMEAR: ADEQUATE
PMV BLD AUTO: 11.1 FL (ref 8.9–12.7)
POIKILOCYTOSIS BLD QL SMEAR: PRESENT
POLYCHROMASIA BLD QL SMEAR: PRESENT
RBC # BLD AUTO: 2.58 MILLION/UL (ref 3.81–5.12)
RBC MORPH BLD: PRESENT
RH BLD: NEGATIVE
SPECIMEN EXPIRATION DATE: NORMAL
WBC # BLD AUTO: 7.5 THOUSAND/UL (ref 4.31–10.16)

## 2020-03-12 PROCEDURE — 36415 COLL VENOUS BLD VENIPUNCTURE: CPT

## 2020-03-12 PROCEDURE — 82570 ASSAY OF URINE CREATININE: CPT | Performed by: FAMILY MEDICINE

## 2020-03-12 PROCEDURE — 86900 BLOOD TYPING SEROLOGIC ABO: CPT

## 2020-03-12 PROCEDURE — 3060F POS MICROALBUMINURIA REV: CPT | Performed by: OBSTETRICS & GYNECOLOGY

## 2020-03-12 PROCEDURE — 86901 BLOOD TYPING SEROLOGIC RH(D): CPT

## 2020-03-12 PROCEDURE — 85027 COMPLETE CBC AUTOMATED: CPT

## 2020-03-12 PROCEDURE — 82043 UR ALBUMIN QUANTITATIVE: CPT | Performed by: FAMILY MEDICINE

## 2020-03-12 PROCEDURE — 86850 RBC ANTIBODY SCREEN: CPT

## 2020-03-12 PROCEDURE — 85007 BL SMEAR W/DIFF WBC COUNT: CPT

## 2020-03-12 NOTE — TELEPHONE ENCOUNTER
[facility] - Infusion center calling regarding blood consent for transfusion on Saturday  Advised patient will sign in office tomorrow  Would like consent faxed to [phone]  Routing to clerical staff to fax tomorrow

## 2020-03-13 ENCOUNTER — OFFICE VISIT (OUTPATIENT)
Dept: OBGYN CLINIC | Facility: CLINIC | Age: 40
End: 2020-03-13
Payer: COMMERCIAL

## 2020-03-13 VITALS — BODY MASS INDEX: 40.72 KG/M2 | WEIGHT: 260 LBS | SYSTOLIC BLOOD PRESSURE: 116 MMHG | DIASTOLIC BLOOD PRESSURE: 68 MMHG

## 2020-03-13 DIAGNOSIS — N93.9 ABNORMAL UTERINE BLEEDING (AUB): Primary | ICD-10-CM

## 2020-03-13 DIAGNOSIS — N88.8 MASS OF CERVIX: ICD-10-CM

## 2020-03-13 DIAGNOSIS — N93.9 ABNORMAL VAGINAL BLEEDING: ICD-10-CM

## 2020-03-13 LAB — SL AMB POCT URINE HCG: NEGATIVE

## 2020-03-13 PROCEDURE — 58100 BIOPSY OF UTERUS LINING: CPT | Performed by: OBSTETRICS & GYNECOLOGY

## 2020-03-13 PROCEDURE — 88305 TISSUE EXAM BY PATHOLOGIST: CPT | Performed by: PATHOLOGY

## 2020-03-13 PROCEDURE — 88341 IMHCHEM/IMCYTCHM EA ADD ANTB: CPT | Performed by: PATHOLOGY

## 2020-03-13 PROCEDURE — 81025 URINE PREGNANCY TEST: CPT | Performed by: OBSTETRICS & GYNECOLOGY

## 2020-03-13 PROCEDURE — 88342 IMHCHEM/IMCYTCHM 1ST ANTB: CPT | Performed by: PATHOLOGY

## 2020-03-13 PROCEDURE — 17110 DESTRUCTION B9 LES UP TO 14: CPT | Performed by: OBSTETRICS & GYNECOLOGY

## 2020-03-13 NOTE — PROGRESS NOTES
Lesion Destruction  Date/Time: 3/13/2020 11:50 AM  Performed by: Ana Delgadillo DO  Authorized by: Ana Delgadillo DO     Procedure Details - Lesion Destruction:     Number of Lesions:  1  Lesion 1:     Body area: Anogenital    Anogenital location: cervix  Malignancy: benign lesion      Destruction method: surgical removal    Lesion 6:      The patient was placed in the dorsal lithotomy position with feet in stirrups  Speculum was inserted into the vagina  An approximately 3 cm friable mass was noted to be prolapsing through the cervix  The cervix was cleaned with betadine x 3  A tenaculum was applied to the anterior lip of the cervix  Using ring forceps, the mass was grasped and twisted and part of the mass was removed  This was repeated and the remainder of the mass was removed  No further stalk or mass was visible at the cervix or just inside the cervix  Monsel's solution was applied to the tenaculum site  The patient tolerated the procedure well

## 2020-03-13 NOTE — PROGRESS NOTES
Endometrial biopsy  Date/Time: 3/13/2020 11:48 AM  Performed by: Jessenia Low DO  Authorized by: Jessenia Low DO     Consent:     Consent obtained:  Verbal and written    Consent given by:  Patient    Procedural risks discussed:  Bleeding, failure rate and infection    Patient questions answered: yes      Patient agrees, verbalizes understanding, and wants to proceed: yes    Indication:     Indications:  Other disorder of menstruation and other abnormal bleeding from female genital tract    Procedure:     Procedure: endometrial biopsy with Pipelle      A bivalve speculum was placed in the vagina: yes      Cervix cleaned and prepped: yes      Uterus sounded: yes      Uterus sound depth (cm):  6    Specimen collected: specimen collected and sent to pathology    Comments:      EMB collected after removal of mass - please see separate documentation

## 2020-03-14 ENCOUNTER — HOSPITAL ENCOUNTER (OUTPATIENT)
Dept: INFUSION CENTER | Facility: HOSPITAL | Age: 40
Discharge: HOME | End: 2020-03-14
Payer: COMMERCIAL

## 2020-03-14 VITALS
SYSTOLIC BLOOD PRESSURE: 144 MMHG | RESPIRATION RATE: 16 BRPM | OXYGEN SATURATION: 99 % | HEART RATE: 88 BPM | DIASTOLIC BLOOD PRESSURE: 70 MMHG | TEMPERATURE: 97.2 F

## 2020-03-14 DIAGNOSIS — D50.0 IRON DEFICIENCY ANEMIA DUE TO CHRONIC BLOOD LOSS: Primary | ICD-10-CM

## 2020-03-14 LAB
ABO GROUP BLD: NORMAL
RH BLD: NEGATIVE

## 2020-03-14 PROCEDURE — P9040 RBC LEUKOREDUCED IRRADIATED: HCPCS

## 2020-03-14 PROCEDURE — 36430 TRANSFUSION BLD/BLD COMPNT: CPT

## 2020-03-14 PROCEDURE — 86920 COMPATIBILITY TEST SPIN: CPT

## 2020-03-14 PROCEDURE — P9016 RBC LEUKOCYTES REDUCED: HCPCS

## 2020-03-14 RX ORDER — SODIUM CHLORIDE 9 MG/ML
20 INJECTION, SOLUTION INTRAVENOUS ONCE
Status: CANCELLED | OUTPATIENT
Start: 2020-03-14

## 2020-03-14 RX ORDER — SODIUM CHLORIDE 9 MG/ML
20 INJECTION, SOLUTION INTRAVENOUS ONCE
Status: COMPLETED | OUTPATIENT
Start: 2020-03-14 | End: 2020-03-14

## 2020-03-14 RX ADMIN — SODIUM CHLORIDE 20 ML/HR: 0.9 INJECTION, SOLUTION INTRAVENOUS at 08:10

## 2020-03-14 NOTE — PLAN OF CARE
Problem: Potential for Falls  Goal: Patient will remain free of falls  Description  INTERVENTIONS:  - Assess patient frequently for physical needs  -  Identify cognitive and physical deficits and behaviors that affect risk of falls    -  [place] fall precautions as indicated by assessment   - Educate patient/family on patient safety including physical limitations  - Instruct patient to call for assistance with activity based on assessment  - Modify environment to reduce risk of injury  - Consider OT/PT consult to assist with strengthening/mobility  Outcome: Progressing

## 2020-03-15 LAB
ABO GROUP BLD BPU: NORMAL
ABO GROUP BLD BPU: NORMAL
BPU ID: NORMAL
BPU ID: NORMAL
CROSSMATCH: NORMAL
CROSSMATCH: NORMAL
UNIT DISPENSE STATUS: NORMAL
UNIT DISPENSE STATUS: NORMAL
UNIT PRODUCT CODE: NORMAL
UNIT PRODUCT CODE: NORMAL
UNIT RH: NORMAL
UNIT RH: NORMAL

## 2020-03-16 ENCOUNTER — TELEPHONE (OUTPATIENT)
Dept: GYNECOLOGY | Facility: CLINIC | Age: 40
End: 2020-03-16

## 2020-04-01 ENCOUNTER — TELEPHONE (OUTPATIENT)
Dept: HEMATOLOGY ONCOLOGY | Facility: CLINIC | Age: 40
End: 2020-04-01

## 2020-04-01 ENCOUNTER — TELEPHONE (OUTPATIENT)
Dept: OBGYN CLINIC | Facility: CLINIC | Age: 40
End: 2020-04-01

## 2020-04-01 DIAGNOSIS — C55 ENDOMETRIOID ADENOCARCINOMA OF UTERUS (HCC): Primary | ICD-10-CM

## 2020-04-02 PROBLEM — C54.1 ENDOMETRIAL CANCER (HCC): Status: ACTIVE | Noted: 2020-04-02

## 2020-04-06 ENCOUNTER — CONSULT (OUTPATIENT)
Dept: GYNECOLOGIC ONCOLOGY | Facility: CLINIC | Age: 40
End: 2020-04-06
Payer: COMMERCIAL

## 2020-04-06 ENCOUNTER — APPOINTMENT (OUTPATIENT)
Dept: LAB | Facility: CLINIC | Age: 40
End: 2020-04-06
Payer: COMMERCIAL

## 2020-04-06 VITALS
TEMPERATURE: 97.4 F | DIASTOLIC BLOOD PRESSURE: 84 MMHG | HEIGHT: 67 IN | HEART RATE: 80 BPM | WEIGHT: 262.5 LBS | SYSTOLIC BLOOD PRESSURE: 120 MMHG | BODY MASS INDEX: 41.2 KG/M2 | RESPIRATION RATE: 18 BRPM

## 2020-04-06 DIAGNOSIS — C55 ENDOMETRIOID ADENOCARCINOMA OF UTERUS (HCC): ICD-10-CM

## 2020-04-06 DIAGNOSIS — C54.1 ENDOMETRIAL CANCER (HCC): Primary | ICD-10-CM

## 2020-04-06 DIAGNOSIS — E11.65 TYPE 2 DIABETES MELLITUS WITH HYPERGLYCEMIA, WITHOUT LONG-TERM CURRENT USE OF INSULIN (HCC): ICD-10-CM

## 2020-04-06 DIAGNOSIS — F32.A DEPRESSION, UNSPECIFIED DEPRESSION TYPE: ICD-10-CM

## 2020-04-06 DIAGNOSIS — E66.01 CLASS 3 SEVERE OBESITY DUE TO EXCESS CALORIES WITHOUT SERIOUS COMORBIDITY WITH BODY MASS INDEX (BMI) OF 40.0 TO 44.9 IN ADULT (HCC): ICD-10-CM

## 2020-04-06 DIAGNOSIS — C54.1 ENDOMETRIAL CANCER (HCC): ICD-10-CM

## 2020-04-06 LAB
ABO GROUP BLD: NORMAL
ALBUMIN SERPL BCP-MCNC: 4.1 G/DL (ref 3.5–5)
ALP SERPL-CCNC: 55 U/L (ref 46–116)
ALT SERPL W P-5'-P-CCNC: 29 U/L (ref 12–78)
ANION GAP SERPL CALCULATED.3IONS-SCNC: 8 MMOL/L (ref 4–13)
AST SERPL W P-5'-P-CCNC: 13 U/L (ref 5–45)
BILIRUB SERPL-MCNC: 0.19 MG/DL (ref 0.2–1)
BLD GP AB SCN SERPL QL: NEGATIVE
BUN SERPL-MCNC: 22 MG/DL (ref 5–25)
CALCIUM SERPL-MCNC: 9.4 MG/DL (ref 8.3–10.1)
CHLORIDE SERPL-SCNC: 104 MMOL/L (ref 100–108)
CO2 SERPL-SCNC: 30 MMOL/L (ref 21–32)
CREAT SERPL-MCNC: 0.86 MG/DL (ref 0.6–1.3)
ERYTHROCYTE [DISTWIDTH] IN BLOOD BY AUTOMATED COUNT: 15.2 % (ref 11.6–15.1)
EST. AVERAGE GLUCOSE BLD GHB EST-MCNC: 94 MG/DL
GFR SERPL CREATININE-BSD FRML MDRD: 85 ML/MIN/1.73SQ M
GLUCOSE P FAST SERPL-MCNC: 79 MG/DL (ref 65–99)
HBA1C MFR BLD: 4.9 %
HCT VFR BLD AUTO: 44.4 % (ref 34.8–46.1)
HGB BLD-MCNC: 13.6 G/DL (ref 11.5–15.4)
MCH RBC QN AUTO: 28.3 PG (ref 26.8–34.3)
MCHC RBC AUTO-ENTMCNC: 30.6 G/DL (ref 31.4–37.4)
MCV RBC AUTO: 92 FL (ref 82–98)
PLATELET # BLD AUTO: 252 THOUSANDS/UL (ref 149–390)
PMV BLD AUTO: 10.3 FL (ref 8.9–12.7)
POTASSIUM SERPL-SCNC: 4.3 MMOL/L (ref 3.5–5.3)
PROT SERPL-MCNC: 8.1 G/DL (ref 6.4–8.2)
RBC # BLD AUTO: 4.81 MILLION/UL (ref 3.81–5.12)
RH BLD: NEGATIVE
SODIUM SERPL-SCNC: 142 MMOL/L (ref 136–145)
SPECIMEN EXPIRATION DATE: NORMAL
WBC # BLD AUTO: 5.57 THOUSAND/UL (ref 4.31–10.16)

## 2020-04-06 PROCEDURE — 85027 COMPLETE CBC AUTOMATED: CPT

## 2020-04-06 PROCEDURE — 36415 COLL VENOUS BLD VENIPUNCTURE: CPT

## 2020-04-06 PROCEDURE — 86901 BLOOD TYPING SEROLOGIC RH(D): CPT

## 2020-04-06 PROCEDURE — 99244 OFF/OP CNSLTJ NEW/EST MOD 40: CPT | Performed by: OBSTETRICS & GYNECOLOGY

## 2020-04-06 PROCEDURE — 80053 COMPREHEN METABOLIC PANEL: CPT

## 2020-04-06 PROCEDURE — 86850 RBC ANTIBODY SCREEN: CPT

## 2020-04-06 PROCEDURE — 3044F HG A1C LEVEL LT 7.0%: CPT | Performed by: OBSTETRICS & GYNECOLOGY

## 2020-04-06 PROCEDURE — 83036 HEMOGLOBIN GLYCOSYLATED A1C: CPT

## 2020-04-06 PROCEDURE — 86900 BLOOD TYPING SEROLOGIC ABO: CPT

## 2020-04-06 RX ORDER — HEPARIN SODIUM 5000 [USP'U]/ML
5000 INJECTION, SOLUTION INTRAVENOUS; SUBCUTANEOUS
Status: CANCELLED | OUTPATIENT
Start: 2020-04-06 | End: 2020-04-07

## 2020-04-06 RX ORDER — SODIUM CHLORIDE 9 MG/ML
125 INJECTION, SOLUTION INTRAVENOUS CONTINUOUS
Status: CANCELLED | OUTPATIENT
Start: 2020-04-06

## 2020-04-06 RX ORDER — SERTRALINE HYDROCHLORIDE 100 MG/1
TABLET, FILM COATED ORAL
Qty: 180 TABLET | Refills: 1 | Status: ON HOLD | OUTPATIENT
Start: 2020-04-06 | End: 2021-03-12 | Stop reason: SDUPTHER

## 2020-04-11 DIAGNOSIS — E11.65 TYPE 2 DIABETES MELLITUS WITH HYPERGLYCEMIA, WITHOUT LONG-TERM CURRENT USE OF INSULIN (HCC): ICD-10-CM

## 2020-04-15 ENCOUNTER — ANESTHESIA EVENT (OUTPATIENT)
Dept: PERIOP | Facility: HOSPITAL | Age: 40
End: 2020-04-15
Payer: COMMERCIAL

## 2020-04-16 ENCOUNTER — ANESTHESIA (OUTPATIENT)
Dept: PERIOP | Facility: HOSPITAL | Age: 40
End: 2020-04-16
Payer: COMMERCIAL

## 2020-04-16 ENCOUNTER — HOSPITAL ENCOUNTER (OUTPATIENT)
Facility: HOSPITAL | Age: 40
Setting detail: OUTPATIENT SURGERY
Discharge: HOME | End: 2020-04-16
Attending: OBSTETRICS & GYNECOLOGY | Admitting: OBSTETRICS & GYNECOLOGY
Payer: COMMERCIAL

## 2020-04-16 VITALS
RESPIRATION RATE: 18 BRPM | DIASTOLIC BLOOD PRESSURE: 86 MMHG | HEIGHT: 67 IN | HEART RATE: 107 BPM | TEMPERATURE: 97.6 F | WEIGHT: 262 LBS | OXYGEN SATURATION: 96 % | BODY MASS INDEX: 41.12 KG/M2 | SYSTOLIC BLOOD PRESSURE: 128 MMHG

## 2020-04-16 DIAGNOSIS — C54.1 ENDOMETRIAL CANCER (HCC): Primary | ICD-10-CM

## 2020-04-16 LAB
ABO GROUP BLD: NORMAL
BLD GP AB SCN SERPL QL: NEGATIVE
EXT PREGNANCY TEST URINE: NEGATIVE
EXT. CONTROL: NORMAL
GLUCOSE SERPL-MCNC: 149 MG/DL (ref 65–140)
GLUCOSE SERPL-MCNC: 64 MG/DL (ref 65–140)
RH BLD: NEGATIVE
SPECIMEN EXPIRATION DATE: NORMAL

## 2020-04-16 PROCEDURE — 58571 TLH W/T/O 250 G OR LESS: CPT | Performed by: OBSTETRICS & GYNECOLOGY

## 2020-04-16 PROCEDURE — 81025 URINE PREGNANCY TEST: CPT | Performed by: STUDENT IN AN ORGANIZED HEALTH CARE EDUCATION/TRAINING PROGRAM

## 2020-04-16 PROCEDURE — 88341 IMHCHEM/IMCYTCHM EA ADD ANTB: CPT | Performed by: PATHOLOGY

## 2020-04-16 PROCEDURE — 38900 IO MAP OF SENT LYMPH NODE: CPT | Performed by: OBSTETRICS & GYNECOLOGY

## 2020-04-16 PROCEDURE — 88112 CYTOPATH CELL ENHANCE TECH: CPT | Performed by: PATHOLOGY

## 2020-04-16 PROCEDURE — 86850 RBC ANTIBODY SCREEN: CPT | Performed by: OBSTETRICS & GYNECOLOGY

## 2020-04-16 PROCEDURE — 38570 LAPAROSCOPY LYMPH NODE BIOP: CPT | Performed by: OBSTETRICS & GYNECOLOGY

## 2020-04-16 PROCEDURE — 88309 TISSUE EXAM BY PATHOLOGIST: CPT | Performed by: PATHOLOGY

## 2020-04-16 PROCEDURE — 88342 IMHCHEM/IMCYTCHM 1ST ANTB: CPT | Performed by: PATHOLOGY

## 2020-04-16 PROCEDURE — 82948 REAGENT STRIP/BLOOD GLUCOSE: CPT

## 2020-04-16 PROCEDURE — 86901 BLOOD TYPING SEROLOGIC RH(D): CPT | Performed by: OBSTETRICS & GYNECOLOGY

## 2020-04-16 PROCEDURE — 88307 TISSUE EXAM BY PATHOLOGIST: CPT | Performed by: PATHOLOGY

## 2020-04-16 PROCEDURE — 86900 BLOOD TYPING SEROLOGIC ABO: CPT | Performed by: OBSTETRICS & GYNECOLOGY

## 2020-04-16 RX ORDER — ONDANSETRON 2 MG/ML
4 INJECTION INTRAMUSCULAR; INTRAVENOUS EVERY 6 HOURS PRN
Status: DISCONTINUED | OUTPATIENT
Start: 2020-04-16 | End: 2020-04-16 | Stop reason: HOSPADM

## 2020-04-16 RX ORDER — SODIUM CHLORIDE, SODIUM LACTATE, POTASSIUM CHLORIDE, CALCIUM CHLORIDE 600; 310; 30; 20 MG/100ML; MG/100ML; MG/100ML; MG/100ML
50 INJECTION, SOLUTION INTRAVENOUS CONTINUOUS
Status: DISCONTINUED | OUTPATIENT
Start: 2020-04-16 | End: 2020-04-16 | Stop reason: HOSPADM

## 2020-04-16 RX ORDER — INDOCYANINE GREEN AND WATER 25 MG
KIT INJECTION AS NEEDED
Status: DISCONTINUED | OUTPATIENT
Start: 2020-04-16 | End: 2020-04-16 | Stop reason: HOSPADM

## 2020-04-16 RX ORDER — ACETAMINOPHEN 325 MG/1
650 TABLET ORAL EVERY 4 HOURS PRN
Status: DISCONTINUED | OUTPATIENT
Start: 2020-04-16 | End: 2020-04-16 | Stop reason: HOSPADM

## 2020-04-16 RX ORDER — CEFAZOLIN SODIUM 2 G/50ML
2000 SOLUTION INTRAVENOUS ONCE
Status: COMPLETED | OUTPATIENT
Start: 2020-04-16 | End: 2020-04-16

## 2020-04-16 RX ORDER — DIPHENHYDRAMINE HYDROCHLORIDE 50 MG/ML
25 INJECTION INTRAMUSCULAR; INTRAVENOUS EVERY 6 HOURS PRN
Status: DISCONTINUED | OUTPATIENT
Start: 2020-04-16 | End: 2020-04-16 | Stop reason: HOSPADM

## 2020-04-16 RX ORDER — FENTANYL CITRATE 50 UG/ML
INJECTION, SOLUTION INTRAMUSCULAR; INTRAVENOUS AS NEEDED
Status: DISCONTINUED | OUTPATIENT
Start: 2020-04-16 | End: 2020-04-16 | Stop reason: SURG

## 2020-04-16 RX ORDER — ONDANSETRON 2 MG/ML
4 INJECTION INTRAMUSCULAR; INTRAVENOUS ONCE AS NEEDED
Status: DISCONTINUED | OUTPATIENT
Start: 2020-04-16 | End: 2020-04-16 | Stop reason: HOSPADM

## 2020-04-16 RX ORDER — IBUPROFEN 600 MG/1
600 TABLET ORAL EVERY 6 HOURS PRN
Status: DISCONTINUED | OUTPATIENT
Start: 2020-04-16 | End: 2020-04-16 | Stop reason: HOSPADM

## 2020-04-16 RX ORDER — SUCCINYLCHOLINE/SOD CL,ISO/PF 100 MG/5ML
SYRINGE (ML) INTRAVENOUS AS NEEDED
Status: DISCONTINUED | OUTPATIENT
Start: 2020-04-16 | End: 2020-04-16 | Stop reason: SURG

## 2020-04-16 RX ORDER — PROPOFOL 10 MG/ML
INJECTION, EMULSION INTRAVENOUS AS NEEDED
Status: DISCONTINUED | OUTPATIENT
Start: 2020-04-16 | End: 2020-04-16 | Stop reason: SURG

## 2020-04-16 RX ORDER — LABETALOL 20 MG/4 ML (5 MG/ML) INTRAVENOUS SYRINGE
10 ONCE
Status: DISCONTINUED | OUTPATIENT
Start: 2020-04-16 | End: 2020-04-16 | Stop reason: HOSPADM

## 2020-04-16 RX ORDER — OXYCODONE HYDROCHLORIDE 5 MG/1
5 TABLET ORAL EVERY 4 HOURS PRN
Qty: 15 TABLET | Refills: 0 | Status: SHIPPED | OUTPATIENT
Start: 2020-04-16 | End: 2020-04-26

## 2020-04-16 RX ORDER — SODIUM CHLORIDE 9 MG/ML
50 INJECTION, SOLUTION INTRAVENOUS CONTINUOUS
Status: DISCONTINUED | OUTPATIENT
Start: 2020-04-16 | End: 2020-04-16 | Stop reason: HOSPADM

## 2020-04-16 RX ORDER — MAGNESIUM HYDROXIDE 1200 MG/15ML
LIQUID ORAL AS NEEDED
Status: DISCONTINUED | OUTPATIENT
Start: 2020-04-16 | End: 2020-04-16 | Stop reason: HOSPADM

## 2020-04-16 RX ORDER — FENTANYL CITRATE/PF 50 MCG/ML
25 SYRINGE (ML) INJECTION
Status: DISCONTINUED | OUTPATIENT
Start: 2020-04-16 | End: 2020-04-16 | Stop reason: HOSPADM

## 2020-04-16 RX ORDER — PROMETHAZINE HYDROCHLORIDE 25 MG/ML
12.5 INJECTION, SOLUTION INTRAMUSCULAR; INTRAVENOUS ONCE AS NEEDED
Status: DISCONTINUED | OUTPATIENT
Start: 2020-04-16 | End: 2020-04-16 | Stop reason: HOSPADM

## 2020-04-16 RX ORDER — SODIUM CHLORIDE 9 MG/ML
INJECTION, SOLUTION INTRAVENOUS CONTINUOUS PRN
Status: DISCONTINUED | OUTPATIENT
Start: 2020-04-16 | End: 2020-04-16

## 2020-04-16 RX ORDER — GLYCOPYRROLATE 0.2 MG/ML
INJECTION INTRAMUSCULAR; INTRAVENOUS AS NEEDED
Status: DISCONTINUED | OUTPATIENT
Start: 2020-04-16 | End: 2020-04-16 | Stop reason: SURG

## 2020-04-16 RX ORDER — LIDOCAINE HYDROCHLORIDE 10 MG/ML
INJECTION, SOLUTION EPIDURAL; INFILTRATION; INTRACAUDAL; PERINEURAL AS NEEDED
Status: DISCONTINUED | OUTPATIENT
Start: 2020-04-16 | End: 2020-04-16 | Stop reason: SURG

## 2020-04-16 RX ORDER — SODIUM CHLORIDE 9 MG/ML
125 INJECTION, SOLUTION INTRAVENOUS CONTINUOUS
Status: DISCONTINUED | OUTPATIENT
Start: 2020-04-16 | End: 2020-04-16 | Stop reason: HOSPADM

## 2020-04-16 RX ORDER — HYDROMORPHONE HCL/PF 1 MG/ML
0.5 SYRINGE (ML) INJECTION
Status: DISCONTINUED | OUTPATIENT
Start: 2020-04-16 | End: 2020-04-16 | Stop reason: HOSPADM

## 2020-04-16 RX ORDER — MEPERIDINE HYDROCHLORIDE 25 MG/ML
12.5 INJECTION INTRAMUSCULAR; INTRAVENOUS; SUBCUTANEOUS AS NEEDED
Status: DISCONTINUED | OUTPATIENT
Start: 2020-04-16 | End: 2020-04-16 | Stop reason: HOSPADM

## 2020-04-16 RX ORDER — ALBUTEROL SULFATE 2.5 MG/3ML
2.5 SOLUTION RESPIRATORY (INHALATION) ONCE AS NEEDED
Status: DISCONTINUED | OUTPATIENT
Start: 2020-04-16 | End: 2020-04-16 | Stop reason: HOSPADM

## 2020-04-16 RX ORDER — DEXAMETHASONE SODIUM PHOSPHATE 10 MG/ML
INJECTION, SOLUTION INTRAMUSCULAR; INTRAVENOUS AS NEEDED
Status: DISCONTINUED | OUTPATIENT
Start: 2020-04-16 | End: 2020-04-16 | Stop reason: SURG

## 2020-04-16 RX ORDER — SODIUM CHLORIDE 9 MG/ML
INJECTION, SOLUTION INTRAVENOUS AS NEEDED
Status: DISCONTINUED | OUTPATIENT
Start: 2020-04-16 | End: 2020-04-16 | Stop reason: HOSPADM

## 2020-04-16 RX ORDER — OXYCODONE HYDROCHLORIDE 5 MG/1
5 TABLET ORAL EVERY 4 HOURS PRN
Status: DISCONTINUED | OUTPATIENT
Start: 2020-04-16 | End: 2020-04-16 | Stop reason: HOSPADM

## 2020-04-16 RX ORDER — MIDAZOLAM HYDROCHLORIDE 2 MG/2ML
INJECTION, SOLUTION INTRAMUSCULAR; INTRAVENOUS AS NEEDED
Status: DISCONTINUED | OUTPATIENT
Start: 2020-04-16 | End: 2020-04-16 | Stop reason: SURG

## 2020-04-16 RX ORDER — HEPARIN SODIUM 5000 [USP'U]/ML
5000 INJECTION, SOLUTION INTRAVENOUS; SUBCUTANEOUS
Status: COMPLETED | OUTPATIENT
Start: 2020-04-16 | End: 2020-04-16

## 2020-04-16 RX ORDER — HYDROMORPHONE HCL/PF 1 MG/ML
SYRINGE (ML) INJECTION AS NEEDED
Status: DISCONTINUED | OUTPATIENT
Start: 2020-04-16 | End: 2020-04-16 | Stop reason: SURG

## 2020-04-16 RX ORDER — ONDANSETRON 2 MG/ML
INJECTION INTRAMUSCULAR; INTRAVENOUS AS NEEDED
Status: DISCONTINUED | OUTPATIENT
Start: 2020-04-16 | End: 2020-04-16 | Stop reason: SURG

## 2020-04-16 RX ORDER — OXYCODONE HYDROCHLORIDE 5 MG/1
10 TABLET ORAL EVERY 4 HOURS PRN
Status: DISCONTINUED | OUTPATIENT
Start: 2020-04-16 | End: 2020-04-16 | Stop reason: HOSPADM

## 2020-04-16 RX ORDER — ROCURONIUM BROMIDE 10 MG/ML
INJECTION, SOLUTION INTRAVENOUS AS NEEDED
Status: DISCONTINUED | OUTPATIENT
Start: 2020-04-16 | End: 2020-04-16 | Stop reason: SURG

## 2020-04-16 RX ORDER — LABETALOL 20 MG/4 ML (5 MG/ML) INTRAVENOUS SYRINGE
10 ONCE
Status: COMPLETED | OUTPATIENT
Start: 2020-04-16 | End: 2020-04-16

## 2020-04-16 RX ORDER — LABETALOL 20 MG/4 ML (5 MG/ML) INTRAVENOUS SYRINGE
5
Status: DISCONTINUED | OUTPATIENT
Start: 2020-04-16 | End: 2020-04-16 | Stop reason: HOSPADM

## 2020-04-16 RX ORDER — SODIUM CHLORIDE, SODIUM LACTATE, POTASSIUM CHLORIDE, CALCIUM CHLORIDE 600; 310; 30; 20 MG/100ML; MG/100ML; MG/100ML; MG/100ML
INJECTION, SOLUTION INTRAVENOUS CONTINUOUS PRN
Status: DISCONTINUED | OUTPATIENT
Start: 2020-04-16 | End: 2020-04-16

## 2020-04-16 RX ORDER — SODIUM CHLORIDE, SODIUM LACTATE, POTASSIUM CHLORIDE, CALCIUM CHLORIDE 600; 310; 30; 20 MG/100ML; MG/100ML; MG/100ML; MG/100ML
125 INJECTION, SOLUTION INTRAVENOUS CONTINUOUS
Status: DISCONTINUED | OUTPATIENT
Start: 2020-04-16 | End: 2020-04-16 | Stop reason: HOSPADM

## 2020-04-16 RX ORDER — HYDRALAZINE HYDROCHLORIDE 20 MG/ML
5 INJECTION INTRAMUSCULAR; INTRAVENOUS ONCE
Status: COMPLETED | OUTPATIENT
Start: 2020-04-16 | End: 2020-04-16

## 2020-04-16 RX ORDER — BUPIVACAINE HYDROCHLORIDE 5 MG/ML
INJECTION, SOLUTION PERINEURAL AS NEEDED
Status: DISCONTINUED | OUTPATIENT
Start: 2020-04-16 | End: 2020-04-16 | Stop reason: HOSPADM

## 2020-04-16 RX ORDER — NEOSTIGMINE METHYLSULFATE 1 MG/ML
INJECTION INTRAVENOUS AS NEEDED
Status: DISCONTINUED | OUTPATIENT
Start: 2020-04-16 | End: 2020-04-16 | Stop reason: SURG

## 2020-04-16 RX ADMIN — LIDOCAINE HYDROCHLORIDE 50 MG: 10 INJECTION, SOLUTION EPIDURAL; INFILTRATION; INTRACAUDAL; PERINEURAL at 07:58

## 2020-04-16 RX ADMIN — FENTANYL CITRATE 50 MCG: 50 INJECTION INTRAMUSCULAR; INTRAVENOUS at 08:51

## 2020-04-16 RX ADMIN — LABETALOL 20 MG/4 ML (5 MG/ML) INTRAVENOUS SYRINGE 10 MG: at 11:45

## 2020-04-16 RX ADMIN — ROCURONIUM BROMIDE 20 MG: 10 INJECTION, SOLUTION INTRAVENOUS at 08:38

## 2020-04-16 RX ADMIN — SODIUM CHLORIDE, SODIUM LACTATE, POTASSIUM CHLORIDE, AND CALCIUM CHLORIDE: .6; .31; .03; .02 INJECTION, SOLUTION INTRAVENOUS at 07:12

## 2020-04-16 RX ADMIN — GLYCOPYRROLATE 0.2 MG: 0.2 INJECTION, SOLUTION INTRAMUSCULAR; INTRAVENOUS at 08:28

## 2020-04-16 RX ADMIN — SODIUM CHLORIDE: 0.9 INJECTION, SOLUTION INTRAVENOUS at 08:03

## 2020-04-16 RX ADMIN — CEFAZOLIN SODIUM 2000 MG: 2 SOLUTION INTRAVENOUS at 07:54

## 2020-04-16 RX ADMIN — HYDROMORPHONE HYDROCHLORIDE 0.5 MG: 1 INJECTION, SOLUTION INTRAMUSCULAR; INTRAVENOUS; SUBCUTANEOUS at 08:40

## 2020-04-16 RX ADMIN — SODIUM CHLORIDE, SODIUM LACTATE, POTASSIUM CHLORIDE, AND CALCIUM CHLORIDE: .6; .31; .03; .02 INJECTION, SOLUTION INTRAVENOUS at 09:38

## 2020-04-16 RX ADMIN — HYDROMORPHONE HYDROCHLORIDE 1 MG: 1 INJECTION, SOLUTION INTRAMUSCULAR; INTRAVENOUS; SUBCUTANEOUS at 08:43

## 2020-04-16 RX ADMIN — MIDAZOLAM HYDROCHLORIDE 2 MG: 1 INJECTION, SOLUTION INTRAMUSCULAR; INTRAVENOUS at 07:55

## 2020-04-16 RX ADMIN — Medication 100 MG: at 07:58

## 2020-04-16 RX ADMIN — HYDROMORPHONE HYDROCHLORIDE 0.5 MG: 1 INJECTION, SOLUTION INTRAMUSCULAR; INTRAVENOUS; SUBCUTANEOUS at 08:28

## 2020-04-16 RX ADMIN — FENTANYL CITRATE 100 MCG: 50 INJECTION INTRAMUSCULAR; INTRAVENOUS at 09:06

## 2020-04-16 RX ADMIN — FENTANYL CITRATE 50 MCG: 50 INJECTION INTRAMUSCULAR; INTRAVENOUS at 08:54

## 2020-04-16 RX ADMIN — ROCURONIUM BROMIDE 50 MG: 10 INJECTION, SOLUTION INTRAVENOUS at 08:03

## 2020-04-16 RX ADMIN — FENTANYL CITRATE 50 MCG: 50 INJECTION INTRAMUSCULAR; INTRAVENOUS at 07:55

## 2020-04-16 RX ADMIN — GLYCOPYRROLATE 0.2 MG: 0.2 INJECTION, SOLUTION INTRAMUSCULAR; INTRAVENOUS at 10:23

## 2020-04-16 RX ADMIN — ONDANSETRON 4 MG: 2 INJECTION INTRAMUSCULAR; INTRAVENOUS at 10:08

## 2020-04-16 RX ADMIN — ROCURONIUM BROMIDE 10 MG: 10 INJECTION, SOLUTION INTRAVENOUS at 10:01

## 2020-04-16 RX ADMIN — PROPOFOL 30 MG: 10 INJECTION, EMULSION INTRAVENOUS at 10:49

## 2020-04-16 RX ADMIN — DEXAMETHASONE SODIUM PHOSPHATE 4 MG: 10 INJECTION, SOLUTION INTRAMUSCULAR; INTRAVENOUS at 08:20

## 2020-04-16 RX ADMIN — HYDRALAZINE HYDROCHLORIDE 5 MG: 20 INJECTION INTRAMUSCULAR; INTRAVENOUS at 11:24

## 2020-04-16 RX ADMIN — ROCURONIUM BROMIDE 20 MG: 10 INJECTION, SOLUTION INTRAVENOUS at 08:53

## 2020-04-16 RX ADMIN — FENTANYL CITRATE 50 MCG: 50 INJECTION INTRAMUSCULAR; INTRAVENOUS at 08:15

## 2020-04-16 RX ADMIN — PROPOFOL 200 MG: 10 INJECTION, EMULSION INTRAVENOUS at 07:58

## 2020-04-16 RX ADMIN — NEOSTIGMINE METHYLSULFATE 4 MG: 1 INJECTION INTRAVENOUS at 10:23

## 2020-04-16 RX ADMIN — HEPARIN SODIUM 5000 UNITS: 5000 INJECTION INTRAVENOUS; SUBCUTANEOUS at 07:17

## 2020-04-29 ENCOUNTER — TELEPHONE (OUTPATIENT)
Dept: GYNECOLOGIC ONCOLOGY | Facility: HOSPITAL | Age: 40
End: 2020-04-29

## 2020-05-04 ENCOUNTER — TELEMEDICINE (OUTPATIENT)
Dept: GYNECOLOGIC ONCOLOGY | Facility: CLINIC | Age: 40
End: 2020-05-04

## 2020-05-04 DIAGNOSIS — C54.1 ENDOMETRIAL CANCER (HCC): Primary | ICD-10-CM

## 2020-05-04 PROCEDURE — 99024 POSTOP FOLLOW-UP VISIT: CPT | Performed by: OBSTETRICS & GYNECOLOGY

## 2020-05-08 ENCOUNTER — DOCUMENTATION (OUTPATIENT)
Dept: GYNECOLOGIC ONCOLOGY | Facility: CLINIC | Age: 40
End: 2020-05-08

## 2020-05-31 DIAGNOSIS — E55.9 VITAMIN D DEFICIENCY: ICD-10-CM

## 2020-06-01 ENCOUNTER — TELEPHONE (OUTPATIENT)
Dept: OBGYN CLINIC | Facility: CLINIC | Age: 40
End: 2020-06-01

## 2020-06-01 RX ORDER — CHOLECALCIFEROL (VITAMIN D3) 1250 MCG
CAPSULE ORAL
Qty: 12 CAPSULE | Refills: 0 | OUTPATIENT
Start: 2020-06-01

## 2020-06-02 ENCOUNTER — TELEMEDICINE (OUTPATIENT)
Dept: FAMILY MEDICINE CLINIC | Facility: CLINIC | Age: 40
End: 2020-06-02
Payer: COMMERCIAL

## 2020-06-02 VITALS — BODY MASS INDEX: 41.04 KG/M2 | WEIGHT: 262 LBS

## 2020-06-02 DIAGNOSIS — E11.9 TYPE 2 DIABETES MELLITUS WITHOUT COMPLICATION, WITHOUT LONG-TERM CURRENT USE OF INSULIN (HCC): Primary | ICD-10-CM

## 2020-06-02 PROBLEM — N92.1 MENOMETRORRHAGIA: Status: RESOLVED | Noted: 2020-03-10 | Resolved: 2020-06-02

## 2020-06-02 PROBLEM — N92.6 IRREGULAR BLEEDING: Status: RESOLVED | Noted: 2020-03-03 | Resolved: 2020-06-02

## 2020-06-02 PROBLEM — E11.65 TYPE 2 DIABETES MELLITUS WITH HYPERGLYCEMIA, WITHOUT LONG-TERM CURRENT USE OF INSULIN (HCC): Status: RESOLVED | Noted: 2019-09-12 | Resolved: 2020-06-02

## 2020-06-02 PROCEDURE — 99213 OFFICE O/P EST LOW 20 MIN: CPT | Performed by: FAMILY MEDICINE

## 2020-06-09 ENCOUNTER — TELEMEDICINE (OUTPATIENT)
Dept: FAMILY MEDICINE CLINIC | Facility: CLINIC | Age: 40
End: 2020-06-09
Payer: COMMERCIAL

## 2020-06-09 DIAGNOSIS — E55.9 VITAMIN D DEFICIENCY: ICD-10-CM

## 2020-06-09 DIAGNOSIS — E11.9 TYPE 2 DIABETES MELLITUS WITHOUT COMPLICATION, WITHOUT LONG-TERM CURRENT USE OF INSULIN (HCC): Primary | ICD-10-CM

## 2020-06-09 DIAGNOSIS — Z12.31 SCREENING MAMMOGRAM, ENCOUNTER FOR: ICD-10-CM

## 2020-06-09 PROBLEM — K59.00 CONSTIPATION: Status: RESOLVED | Noted: 2019-12-26 | Resolved: 2020-06-09

## 2020-06-09 PROBLEM — R53.83 FATIGUE: Status: RESOLVED | Noted: 2017-10-16 | Resolved: 2020-06-09

## 2020-06-09 PROCEDURE — 99214 OFFICE O/P EST MOD 30 MIN: CPT | Performed by: FAMILY MEDICINE

## 2020-07-30 ENCOUNTER — TELEPHONE (OUTPATIENT)
Dept: GYNECOLOGIC ONCOLOGY | Facility: CLINIC | Age: 40
End: 2020-07-30

## 2020-07-30 NOTE — TELEPHONE ENCOUNTER
Phone call to patient to complete screening questions for upcoming appointment on 8/3 with Dr Betsy Gonsalves  Left message to return my call

## 2020-07-31 NOTE — ASSESSMENT & PLAN NOTE
42yo with stage IA endometrial adenocarcinoma figo grade 1 here for surveillance visit  Pt did not receive adjuvant therapy  She clinically is with VARINDER  Exam negative today  Will continue with q3-6month surveillance visits or sooner with symptoms of concern

## 2020-07-31 NOTE — ASSESSMENT & PLAN NOTE
Medications recently changed given low BGs      Lab Results   Component Value Date    HGBA1C 4 9 04/06/2020

## 2020-07-31 NOTE — PROGRESS NOTES
Assessment/Plan:    Problem List Items Addressed This Visit        Endocrine    Type 2 diabetes mellitus without complication, without long-term current use of insulin ([facility] )     Medications recently changed given low BGs  Lab Results   Component Value Date    HGBA1C 4 9 04/06/2020               Genitourinary    Endometrial cancer ([facility] ) - Primary     40yo with stage IA endometrial adenocarcinoma figo grade 1 here for surveillance visit  Pt did not receive adjuvant therapy  She clinically is with VARINDER  Exam negative today  Will continue with q3-6month surveillance visits or sooner with symptoms of concern  Other    Depression     Mood stable per pt  Obesity     Pt is aware that weight loss will aid in decreasing her risk of recurrence but decreasing her peripheral estrogen stores  Surgical menopause     D/w pt vitamin D/Calcium supplementation post oophorectomy  - Postmenopausal women should consume 1200 mg (total diet plus supplement) and at least 800IU (20 micrograms) of vitamin D per day    Will plan for DEXA around age 48                     CHIEF COMPLAINT: endometrial cancer       Problem:  Cancer Staging  Endometrial cancer [facility])  Staging form: Corpus Uteri - Carcinoma, AJCC 8th Edition  - Clinical: FIGO Stage IA (cN0(sn), cM0) - Signed by Anabela Calles MD on 5/4/2020        Previous therapy:  Oncology History   Endometrial cancer ([facility] )   3/13/2020 Initial Diagnosis    Endometrial cancer ([facility] ) on EMB     4/16/2020 Surgery    RA-tlh/bso/slnd  A  Right pelvic sentinel lymph node (excision):     - One (1) lymph node, negative for carcinoma (CKAE1/3 reviewed)       B  Left periaortic sentinel lymph node (excision):     - One (1) lymph node, negative for carcinoma (CKAE1/3 reviewed)       C  Uterus, cervix, bilateral fallopian tubes and ovaries (hysterectomy and bilateral salpingo-oophorectomy):     - Endometrial adenocarcinoma, endometrioid type (FIGO grade 1 of 3)  - Carcinoma invades less than 50% of myometrium (see comment)  - No involvement of adnexa or endocervical stroma  - No lymph-vascular invasion       - Surgical margin negative for carcinoma  - 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  5/4/2020 -  Cancer Staged    Staging form: Corpus Uteri - Carcinoma, AJCC 8th Edition  - Clinical: FIGO Stage IA (cN0(sn), cM0) - Signed by Tim Melton MD on 5/4/2020  Method of lymph node assessment: [place] lymph node biopsy  Histologic grade (G): G1  Histologic grading system: 3 grade system  National guidelines used in treatment planning: Yes  Type of national guideline used in treatment planning: NCCN             Patient ID: Jessie Levine is a 36 y o  female  44yo with DM2 and obesity here for surveillance of 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  Pt was recommended to follow up without adjuvant therapy given low risk factors  She recovered well from surgery and has no complaints at this time  Pt denies abdominal bloating/pain/cramping  No vaginal bleeding/discharge  No changes in BM/urination  No early satiety/Appetite adequate  Denies significant weight loss/weight gain  Pt with a significant history of depression with suicidal ideation in the past - currently reports stable mood  The following portions of the patient's history were reviewed and updated as appropriate: allergies, current medications, past family history, past medical history, past social history, past surgical history and problem list     Review of Systems   Constitutional: Negative for appetite change, chills, fatigue and fever  Respiratory: Negative for chest tightness and shortness of breath  Cardiovascular: Negative for leg swelling  Gastrointestinal: Negative for abdominal distention, abdominal pain, constipation, diarrhea and nausea     Genitourinary: Negative for difficulty urinating, flank pain, frequency, urgency, vaginal bleeding, vaginal discharge and vaginal pain  Musculoskeletal: Negative for back pain, joint swelling and myalgias  Skin: Negative for rash  Neurological: Negative for dizziness, light-headedness, numbness and headaches  Current Outpatient Medications   Medication Sig Dispense Refill    Blood Glucose Monitoring Suppl (ACCU-CHEK GUIDE) w/Device KIT daily Use as directed  0    docusate sodium (COLACE) 100 mg capsule Take 100 mg by mouth 2 (two) times a day      FEROSUL 325 (65 Fe) MG tablet Take 1 tablet (325 mg total) by mouth 2 (two) times a day with meals 180 tablet 1    glucose blood test strip use 1 TEST STRIP to TEST BLOOD SUGAR once daily 50 each 1    Lancets (ACCU-CHEK SOFT TOUCH) lancets Use as instructed once daily 100 each 1    metFORMIN (GLUCOPHAGE) 500 mg tablet Take 1 tablet (500 mg total) by mouth 2 (two) times a day with meals 180 tablet 1    sertraline (ZOLOFT) 100 mg tablet take 2 tablets by mouth once daily (Patient taking differently: Take 200 mg by mouth daily in the early morning ) 180 tablet 1    Psyllium (METAMUCIL FIBER PO) Take by mouth as needed       No current facility-administered medications for this visit  Objective:    Blood pressure 144/86, pulse 93, temperature 98 1 °F (36 7 °C), temperature source Tympanic, resp  rate 18, height 5' 7", weight 116 kg (255 lb)  Body mass index is 39 94 kg/m²  Body surface area is 2 24 meters squared  Physical Exam   Constitutional: She is oriented to person, place, and time  She appears well-developed  HENT:   Head: Normocephalic and atraumatic  Neck: Normal range of motion  Cardiovascular: Normal rate and regular rhythm  Pulmonary/Chest: Effort normal    Abdominal: Soft  She exhibits no distension and no mass  Genitourinary:    Genitourinary Comments:  The external female genitalia is normal  The bartholin's, uretheral and skenes glands are normal  The urethral meatus is normal (midline with no lesions)  Anus without fissure or lesion  Speculum exam reveals a grossly normal vagina cuff  No masses, lesions,discharge or bleeding  No significant cystocele or rectocele noted  Bimanual exam notes a surgical absent cervix, uterus and adnexal structures  No masses or fullness  Bladder is without fullness, mass or tenderness  Musculoskeletal: Normal range of motion  Neurological: She is alert and oriented to person, place, and time  Skin: Skin is warm and dry           Lab Results   Component Value Date    K 4 3 04/06/2020     04/06/2020    CO2 30 04/06/2020    BUN 22 04/06/2020    CREATININE 0 86 04/06/2020    GLUF 79 04/06/2020    CALCIUM 9 4 04/06/2020    AST 13 04/06/2020    ALT 29 04/06/2020    ALKPHOS 55 04/06/2020    EGFR 85 04/06/2020     Lab Results   Component Value Date    WBC 5 57 04/06/2020    HGB 13 6 04/06/2020    HCT 44 4 04/06/2020    MCV 92 04/06/2020     04/06/2020     Lab Results   Component Value Date    NEUTROABS 6,806 03/10/2020        Trend:  No results found for:

## 2020-08-03 ENCOUNTER — OFFICE VISIT (OUTPATIENT)
Dept: GYNECOLOGIC ONCOLOGY | Facility: CLINIC | Age: 40
End: 2020-08-03
Payer: COMMERCIAL

## 2020-08-03 VITALS
HEIGHT: 67 IN | TEMPERATURE: 98.1 F | SYSTOLIC BLOOD PRESSURE: 144 MMHG | BODY MASS INDEX: 40.02 KG/M2 | RESPIRATION RATE: 18 BRPM | HEART RATE: 93 BPM | WEIGHT: 255 LBS | DIASTOLIC BLOOD PRESSURE: 86 MMHG

## 2020-08-03 DIAGNOSIS — E66.01 CLASS 3 SEVERE OBESITY DUE TO EXCESS CALORIES WITHOUT SERIOUS COMORBIDITY WITH BODY MASS INDEX (BMI) OF 40.0 TO 44.9 IN ADULT (HCC): ICD-10-CM

## 2020-08-03 DIAGNOSIS — E11.9 TYPE 2 DIABETES MELLITUS WITHOUT COMPLICATION, WITHOUT LONG-TERM CURRENT USE OF INSULIN (HCC): ICD-10-CM

## 2020-08-03 DIAGNOSIS — C54.1 ENDOMETRIAL CANCER (HCC): Primary | ICD-10-CM

## 2020-08-03 DIAGNOSIS — F32.A DEPRESSION, UNSPECIFIED DEPRESSION TYPE: ICD-10-CM

## 2020-08-03 DIAGNOSIS — E89.40 SURGICAL MENOPAUSE: ICD-10-CM

## 2020-08-03 PROCEDURE — 99213 OFFICE O/P EST LOW 20 MIN: CPT | Performed by: OBSTETRICS & GYNECOLOGY

## 2020-08-03 PROCEDURE — 1036F TOBACCO NON-USER: CPT | Performed by: OBSTETRICS & GYNECOLOGY

## 2020-08-03 PROCEDURE — 3044F HG A1C LEVEL LT 7.0%: CPT | Performed by: OBSTETRICS & GYNECOLOGY

## 2020-08-03 PROCEDURE — 3008F BODY MASS INDEX DOCD: CPT | Performed by: OBSTETRICS & GYNECOLOGY

## 2020-08-03 PROCEDURE — 3060F POS MICROALBUMINURIA REV: CPT | Performed by: OBSTETRICS & GYNECOLOGY

## 2020-08-03 PROCEDURE — 3077F SYST BP >= 140 MM HG: CPT | Performed by: OBSTETRICS & GYNECOLOGY

## 2020-08-03 PROCEDURE — 3079F DIAST BP 80-89 MM HG: CPT | Performed by: OBSTETRICS & GYNECOLOGY

## 2020-08-03 NOTE — ASSESSMENT & PLAN NOTE
Pt is aware that weight loss will aid in decreasing her risk of recurrence but decreasing her peripheral estrogen stores

## 2020-08-03 NOTE — ASSESSMENT & PLAN NOTE
D/w pt vitamin D/Calcium supplementation post oophorectomy      - Postmenopausal women should consume 1200 mg (total diet plus supplement) and at least 800IU (20 micrograms) of vitamin D per day    Will plan for DEXA around age 48

## 2020-10-28 ENCOUNTER — TELEPHONE (OUTPATIENT)
Dept: GYNECOLOGIC ONCOLOGY | Facility: CLINIC | Age: 40
End: 2020-10-28

## 2020-11-02 ENCOUNTER — OFFICE VISIT (OUTPATIENT)
Dept: GYNECOLOGIC ONCOLOGY | Facility: CLINIC | Age: 40
End: 2020-11-02

## 2020-11-02 VITALS
TEMPERATURE: 98 F | RESPIRATION RATE: 18 BRPM | DIASTOLIC BLOOD PRESSURE: 94 MMHG | HEIGHT: 67 IN | SYSTOLIC BLOOD PRESSURE: 136 MMHG | HEART RATE: 74 BPM | WEIGHT: 255 LBS | BODY MASS INDEX: 40.02 KG/M2

## 2020-11-02 DIAGNOSIS — C54.1 ENDOMETRIAL CANCER (HCC): Primary | ICD-10-CM

## 2020-11-02 PROBLEM — N88.8 MASS OF CERVIX: Status: RESOLVED | Noted: 2020-03-10 | Resolved: 2020-11-02

## 2020-11-02 PROCEDURE — 99213 OFFICE O/P EST LOW 20 MIN: CPT | Performed by: OBSTETRICS & GYNECOLOGY

## 2020-11-02 PROCEDURE — 3080F DIAST BP >= 90 MM HG: CPT | Performed by: OBSTETRICS & GYNECOLOGY

## 2020-11-02 PROCEDURE — 1036F TOBACCO NON-USER: CPT | Performed by: OBSTETRICS & GYNECOLOGY

## 2020-11-02 PROCEDURE — 3008F BODY MASS INDEX DOCD: CPT | Performed by: OBSTETRICS & GYNECOLOGY

## 2020-11-02 PROCEDURE — 3075F SYST BP GE 130 - 139MM HG: CPT | Performed by: OBSTETRICS & GYNECOLOGY

## 2020-11-20 ENCOUNTER — TELEPHONE (OUTPATIENT)
Dept: GYNECOLOGIC ONCOLOGY | Facility: CLINIC | Age: 40
End: 2020-11-20

## 2020-11-23 ENCOUNTER — TELEPHONE (OUTPATIENT)
Dept: GYNECOLOGIC ONCOLOGY | Facility: CLINIC | Age: 40
End: 2020-11-23

## 2021-01-11 ENCOUNTER — HOSPITAL ENCOUNTER (OUTPATIENT)
Dept: MAMMOGRAPHY | Facility: HOSPITAL | Age: 41
Discharge: HOME | End: 2021-01-11
Attending: FAMILY MEDICINE
Payer: COMMERCIAL

## 2021-01-11 VITALS — HEIGHT: 67 IN | BODY MASS INDEX: 40.02 KG/M2 | WEIGHT: 255 LBS

## 2021-01-11 DIAGNOSIS — Z12.31 SCREENING MAMMOGRAM, ENCOUNTER FOR: ICD-10-CM

## 2021-01-11 PROCEDURE — 77067 SCR MAMMO BI INCL CAD: CPT

## 2021-01-11 PROCEDURE — 77063 BREAST TOMOSYNTHESIS BI: CPT

## 2021-01-28 ENCOUNTER — TELEPHONE (OUTPATIENT)
Dept: FAMILY MEDICINE CLINIC | Facility: CLINIC | Age: 41
End: 2021-01-28

## 2021-01-28 NOTE — TELEPHONE ENCOUNTER
Patient called states she got her vaccine yesterday, she threw up and then states she has red tiny blood vessels above her eye  I asked if she has any fever or other symptoms  Patient declined  I told her to keep and eye on it and call back tomorrow

## 2021-02-19 NOTE — ASSESSMENT & PLAN NOTE
44yo with h/o stage IA endometrial adenocaricnoma, figo grade 1 here for surveillance visit       10 months post surgery  Clinically VARINDER      Continue with q3-6month surveillance visits or sooner with symptoms

## 2021-02-19 NOTE — PROGRESS NOTES
Assessment/Plan:    Problem List Items Addressed This Visit        Genitourinary    Endometrial cancer ([place] [address] ) - Primary     44yo with h/o stage IA endometrial adenocaricnoma, figo grade 1 here for surveillance visit       10 months post surgery  Clinically VARINDER      Continue with q3-6month surveillance visits or sooner with symptoms  Other    Surgical menopause     Pt with some vaginal dryness -recommended vaginal lubricants - if no improvement can discuss vaginal estrogen  +hot flashes but not bothersome at this point  CHIEF COMPLAINT: surveillance      Problem:  Cancer Staging  Endometrial cancer ([place] [address] )  Staging form: Corpus Uteri - Carcinoma, AJCC 8th Edition  - Clinical: FIGO Stage IA (cN0(sn), cM0) - Signed by Estefani Peace MD on 5/4/2020        Previous therapy:  Oncology History   Endometrial cancer ([place] [address] )   3/13/2020 Initial Diagnosis    Endometrial cancer ([place] [address] ) on EMB     4/16/2020 Surgery    RA-tlh/bso/slnd     5/4/2020 -  Cancer Staged    Staging form: Corpus Uteri - Carcinoma, AJCC 8th Edition  - Clinical: FIGO Stage IA (cN0(sn), cM0) - Signed by Estefani Peace MD on 5/4/2020  Method of lymph node assessment: [place] lymph node biopsy  Histologic grade (G): G1  Histologic grading system: 3 grade system  National guidelines used in treatment planning: Yes  Type of national guideline used in treatment planning: NCCN             Patient ID: Jennifer Adam is a 36 y o  female  44yo with DM2 and obesity here for surveillance of 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  Pt denies abdominal bloating/pain/cramping  No vaginal bleeding/discharge  No changes in BM/urination  No early satiety/Appetite adequate  Denies significant weight loss/weight gain  +hot flashes occasionally but not bothersome  Reports intermittent vaginal dryness feeling but not constant           The following portions of the patient's history were reviewed and updated as appropriate: allergies, current medications, past family history, past medical history, past social history, past surgical history and problem list     Review of Systems   Constitutional: Negative for appetite change, chills, fatigue and fever  Respiratory: Negative for chest tightness and shortness of breath  Gastrointestinal: Negative for abdominal distention, abdominal pain, constipation, diarrhea and nausea  Genitourinary: Negative for difficulty urinating, flank pain, frequency, urgency, vaginal bleeding, vaginal discharge and vaginal pain  Musculoskeletal: Negative for back pain, joint swelling and myalgias  Skin: Negative for rash  Neurological: Negative for dizziness, light-headedness, numbness and headaches  Current Outpatient Medications   Medication Sig Dispense Refill    Blood Glucose Monitoring Suppl (ACCU-CHEK GUIDE) w/Device KIT daily Use as directed  0    docusate sodium (COLACE) 100 mg capsule Take 100 mg by mouth 2 (two) times a day      FEROSUL 325 (65 Fe) MG tablet Take 1 tablet (325 mg total) by mouth 2 (two) times a day with meals 180 tablet 1    glucose blood test strip use 1 TEST STRIP to TEST BLOOD SUGAR once daily 50 each 1    Lancets (ACCU-CHEK SOFT TOUCH) lancets Use as instructed once daily 100 each 1    metFORMIN (GLUCOPHAGE) 500 mg tablet Take 1 tablet (500 mg total) by mouth 2 (two) times a day with meals 180 tablet 1    Psyllium (METAMUCIL FIBER PO) Take by mouth as needed      sertraline (ZOLOFT) 100 mg tablet take 2 tablets by mouth once daily (Patient taking differently: Take 200 mg by mouth daily in the early morning ) 180 tablet 1     No current facility-administered medications for this visit  Objective:    Blood pressure 130/78, pulse 71, temperature 97 6 °F (36 4 °C), temperature source Temporal, resp  rate 18, height 5' 7" (1 702 m), weight 112 kg (247 lb), last menstrual period 03/02/2020  Body mass index is 38 69 kg/m²    Body surface area is 2 21 meters squared  Physical Exam  HENT:      Head: Normocephalic and atraumatic  Neck:      Musculoskeletal: Normal range of motion  Cardiovascular:      Rate and Rhythm: Normal rate and regular rhythm  Pulmonary:      Effort: Pulmonary effort is normal    Abdominal:      General: There is no distension  Palpations: Abdomen is soft  There is no mass  Genitourinary:     Comments: The external female genitalia is normal  The bartholin's, uretheral and skenes glands are normal  The urethral meatus is normal (midline with no lesions)  Anus without fissure or lesion  Speculum exam reveals a grossly normal vagina cuff  No masses, lesions,discharge or bleeding  No significant cystocele or rectocele noted  Bimanual exam notes a surgical absent cervix, uterus and adnexal structures  No masses or fullness  Bladder is without fullness, mass or tenderness  Musculoskeletal: Normal range of motion  Skin:     General: Skin is warm and dry  Neurological:      Mental Status: She is alert and oriented to person, place, and time             Lab Results   Component Value Date    K 4 3 04/06/2020     04/06/2020    CO2 30 04/06/2020    BUN 22 04/06/2020    CREATININE 0 86 04/06/2020    GLUF 79 04/06/2020    CALCIUM 9 4 04/06/2020    AST 13 04/06/2020    ALT 29 04/06/2020    ALKPHOS 55 04/06/2020    EGFR 85 04/06/2020     Lab Results   Component Value Date    WBC 5 57 04/06/2020    HGB 13 6 04/06/2020    HCT 44 4 04/06/2020    MCV 92 04/06/2020     04/06/2020     Lab Results   Component Value Date    NEUTROABS 6,806 03/10/2020        Trend:  No results found for:

## 2021-02-22 ENCOUNTER — OFFICE VISIT (OUTPATIENT)
Dept: GYNECOLOGIC ONCOLOGY | Facility: CLINIC | Age: 41
End: 2021-02-22
Payer: COMMERCIAL

## 2021-02-22 VITALS
DIASTOLIC BLOOD PRESSURE: 78 MMHG | TEMPERATURE: 97.6 F | SYSTOLIC BLOOD PRESSURE: 130 MMHG | HEIGHT: 67 IN | HEART RATE: 71 BPM | WEIGHT: 247 LBS | BODY MASS INDEX: 38.77 KG/M2 | RESPIRATION RATE: 18 BRPM

## 2021-02-22 DIAGNOSIS — C54.1 ENDOMETRIAL CANCER (HCC): Primary | ICD-10-CM

## 2021-02-22 DIAGNOSIS — E89.40 SURGICAL MENOPAUSE: ICD-10-CM

## 2021-02-22 PROCEDURE — 1036F TOBACCO NON-USER: CPT | Performed by: OBSTETRICS & GYNECOLOGY

## 2021-02-22 PROCEDURE — 99213 OFFICE O/P EST LOW 20 MIN: CPT | Performed by: OBSTETRICS & GYNECOLOGY

## 2021-02-22 NOTE — ASSESSMENT & PLAN NOTE
Pt with some vaginal dryness -recommended vaginal lubricants - if no improvement can discuss vaginal estrogen  +hot flashes but not bothersome at this point

## 2021-02-22 NOTE — PATIENT INSTRUCTIONS
Internal Vaginal Moisturizers  Vaginal moisturizers are nonhormonal, over-the-counter products that help relieve vaginal dryness and discomfort  They are available in most drug stores or on the Internet  You can use any of the moisturizers listed below 2 to 3 times a week  However, many women will need to moisturize more often (3 to 5 times per week) after cancer treatment or sudden menopause  Apply the moisturizer at bedtime for the best absorption  Vitamin E liquid capsules - Using a pin, puncture each end of a vitamin E capsule  Insert the capsule into your vagina  Or, you can empty the capsule onto your finger and wipe the vitamin E inside your vagina  Replens®; Hyalo GYN®,  NeoEve Cream, YES, Bee Friendly Vaginal Moisturizer - These are vaginal moisturizers that are inserted into your vagina with an applicator  You may want to put lubricant on the tip of the applicator to make insertion more comfortable  Silver Hones Suppositories; K-Y® Luan Tony; NeuEve Suppositories  - These are suppositories that are inserted into your vagina with a disposable applicator  You may want to put lubricant on the tip of the applicator to make insertion more comfortable  Neogyn Cream, [place] Aloe Vera Gel  - Treats soreness, dryness and a burning sensation in the vulvar area; relieve pain during sexual activity; soothe irritation and redness  The Aloe Vera Gel can be refrigerated for better relief from irritation  External Vaginal Moisturizers  Many women experience dryness or irritation of the vulva  Using an external moisturizer can increase comfort  Natural oils such as vitamin E or coconut oil can be helpful  You can also use Replens® Long-Lasting Vaginal Moisturizer on your vulva  Vaginal Lubricants  Vaginal lubricants usually come in a liquid or gel form  They are used to supplement a womans own lubrication and minimize dryness and pain during sexual activity   Use these lubricants to make sexual intercourse more comfortable and pleasurable  Apply the lubricant to the opening of your vagina and to whatever is being placed in or near your vagina, such as an applicator, dilator, finger, object, or a partners penis  Examples of lubricants include:  [place]® Woman Water   Astroglide®   K-Y® Jelly   Pjur® Woman Bodyglide (a silicone-based lubricant)   [place] oil   Coconut oil  Avoid colored, flavored, and warming lubricants, as well as those containing spermicides  Never use petroleum jelly or Vaseline®  They do not wash away easily and can increase your risk for vaginal infection or irritation      Resource: [address]

## 2021-03-05 ENCOUNTER — HOSPITAL ENCOUNTER (EMERGENCY)
Facility: HOSPITAL | Age: 41
Discharge: OTHER INSTITUTION | End: 2021-03-05
Attending: EMERGENCY MEDICINE | Admitting: EMERGENCY MEDICINE
Payer: COMMERCIAL

## 2021-03-05 ENCOUNTER — HOSPITAL ENCOUNTER (INPATIENT)
Facility: HOSPITAL | Age: 41
LOS: 7 days | Discharge: HOME | DRG: 751 | End: 2021-03-12
Attending: STUDENT IN AN ORGANIZED HEALTH CARE EDUCATION/TRAINING PROGRAM | Admitting: STUDENT IN AN ORGANIZED HEALTH CARE EDUCATION/TRAINING PROGRAM
Payer: COMMERCIAL

## 2021-03-05 ENCOUNTER — TELEPHONE (OUTPATIENT)
Dept: FAMILY MEDICINE CLINIC | Facility: CLINIC | Age: 41
End: 2021-03-05

## 2021-03-05 VITALS
SYSTOLIC BLOOD PRESSURE: 146 MMHG | RESPIRATION RATE: 18 BRPM | DIASTOLIC BLOOD PRESSURE: 75 MMHG | BODY MASS INDEX: 37.16 KG/M2 | TEMPERATURE: 98.7 F | WEIGHT: 236.77 LBS | HEART RATE: 79 BPM | OXYGEN SATURATION: 97 % | HEIGHT: 67 IN

## 2021-03-05 DIAGNOSIS — F32.A DEPRESSION, UNSPECIFIED DEPRESSION TYPE: Primary | ICD-10-CM

## 2021-03-05 DIAGNOSIS — F32.A DEPRESSION, UNSPECIFIED DEPRESSION TYPE: ICD-10-CM

## 2021-03-05 DIAGNOSIS — F33.2 SEVERE EPISODE OF RECURRENT MAJOR DEPRESSIVE DISORDER, WITHOUT PSYCHOTIC FEATURES (HCC): Primary | ICD-10-CM

## 2021-03-05 DIAGNOSIS — F41.1 GENERALIZED ANXIETY DISORDER: ICD-10-CM

## 2021-03-05 DIAGNOSIS — R45.851 SUICIDAL IDEATIONS: ICD-10-CM

## 2021-03-05 LAB
BACTERIA UR QL AUTO: ABNORMAL /HPF
BILIRUB UR QL STRIP: NEGATIVE
CLARITY UR: CLEAR
COLOR UR: YELLOW
ETHANOL EXG-MCNC: 0 MG/DL
EXT PREG TEST URINE: NEGATIVE
EXT. CONTROL ED NAV: NORMAL
FLUAV RNA RESP QL NAA+PROBE: NEGATIVE
FLUBV RNA RESP QL NAA+PROBE: NEGATIVE
GLUCOSE SERPL-MCNC: 111 MG/DL (ref 65–140)
GLUCOSE SERPL-MCNC: 127 MG/DL (ref 65–140)
GLUCOSE SERPL-MCNC: 92 MG/DL (ref 65–140)
GLUCOSE UR STRIP-MCNC: NEGATIVE MG/DL
HGB UR QL STRIP.AUTO: NEGATIVE
KETONES UR STRIP-MCNC: ABNORMAL MG/DL
LEUKOCYTE ESTERASE UR QL STRIP: ABNORMAL
MUCOUS THREADS UR QL AUTO: ABNORMAL
NITRITE UR QL STRIP: NEGATIVE
NON-SQ EPI CELLS URNS QL MICRO: ABNORMAL /HPF
PH UR STRIP.AUTO: 6 [PH] (ref 4.5–8)
PROT UR STRIP-MCNC: ABNORMAL MG/DL
RBC #/AREA URNS AUTO: ABNORMAL /HPF
RSV RNA RESP QL NAA+PROBE: NEGATIVE
SARS-COV-2 RNA RESP QL NAA+PROBE: NEGATIVE
SP GR UR STRIP.AUTO: 1.02 (ref 1–1.03)
UROBILINOGEN UR QL STRIP.AUTO: 0.2 E.U./DL
WBC #/AREA URNS AUTO: ABNORMAL /HPF

## 2021-03-05 PROCEDURE — 81001 URINALYSIS AUTO W/SCOPE: CPT

## 2021-03-05 PROCEDURE — 81025 URINE PREGNANCY TEST: CPT | Performed by: EMERGENCY MEDICINE

## 2021-03-05 PROCEDURE — 99285 EMERGENCY DEPT VISIT HI MDM: CPT | Performed by: EMERGENCY MEDICINE

## 2021-03-05 PROCEDURE — 82948 REAGENT STRIP/BLOOD GLUCOSE: CPT

## 2021-03-05 PROCEDURE — 82075 ASSAY OF BREATH ETHANOL: CPT | Performed by: EMERGENCY MEDICINE

## 2021-03-05 PROCEDURE — 0241U HB NFCT DS VIR RESP RNA 4 TRGT: CPT | Performed by: EMERGENCY MEDICINE

## 2021-03-05 PROCEDURE — 99285 EMERGENCY DEPT VISIT HI MDM: CPT

## 2021-03-05 RX ORDER — POLYETHYLENE GLYCOL 3350 17 G/17G
17 POWDER, FOR SOLUTION ORAL DAILY PRN
Status: CANCELLED | OUTPATIENT
Start: 2021-03-05

## 2021-03-05 RX ORDER — HALOPERIDOL 1 MG/1
1 TABLET ORAL EVERY 6 HOURS PRN
Status: DISCONTINUED | OUTPATIENT
Start: 2021-03-05 | End: 2021-03-12 | Stop reason: HOSPADM

## 2021-03-05 RX ORDER — POLYETHYLENE GLYCOL 3350 17 G/17G
17 POWDER, FOR SOLUTION ORAL DAILY PRN
Status: DISCONTINUED | OUTPATIENT
Start: 2021-03-05 | End: 2021-03-12 | Stop reason: HOSPADM

## 2021-03-05 RX ORDER — HALOPERIDOL 1 MG/1
2.5 TABLET ORAL
Status: CANCELLED | OUTPATIENT
Start: 2021-03-05

## 2021-03-05 RX ORDER — LORAZEPAM 2 MG/ML
1 INJECTION INTRAMUSCULAR EVERY 4 HOURS PRN
Status: CANCELLED | OUTPATIENT
Start: 2021-03-05

## 2021-03-05 RX ORDER — MAGNESIUM HYDROXIDE/ALUMINUM HYDROXICE/SIMETHICONE 120; 1200; 1200 MG/30ML; MG/30ML; MG/30ML
30 SUSPENSION ORAL EVERY 4 HOURS PRN
Status: DISCONTINUED | OUTPATIENT
Start: 2021-03-05 | End: 2021-03-12 | Stop reason: HOSPADM

## 2021-03-05 RX ORDER — BISACODYL 10 MG
10 SUPPOSITORY, RECTAL RECTAL DAILY PRN
Status: CANCELLED | OUTPATIENT
Start: 2021-03-05

## 2021-03-05 RX ORDER — SERTRALINE HYDROCHLORIDE 100 MG/1
200 TABLET, FILM COATED ORAL DAILY
Status: DISCONTINUED | OUTPATIENT
Start: 2021-03-05 | End: 2021-03-05 | Stop reason: HOSPADM

## 2021-03-05 RX ORDER — BENZTROPINE MESYLATE 1 MG/1
1 TABLET ORAL
Status: DISCONTINUED | OUTPATIENT
Start: 2021-03-05 | End: 2021-03-12 | Stop reason: HOSPADM

## 2021-03-05 RX ORDER — HALOPERIDOL 5 MG
5 TABLET ORAL
Status: DISCONTINUED | OUTPATIENT
Start: 2021-03-05 | End: 2021-03-12 | Stop reason: HOSPADM

## 2021-03-05 RX ORDER — IBUPROFEN 400 MG/1
400 TABLET ORAL EVERY 6 HOURS PRN
Status: CANCELLED | OUTPATIENT
Start: 2021-03-05

## 2021-03-05 RX ORDER — ACETAMINOPHEN 325 MG/1
650 TABLET ORAL EVERY 6 HOURS PRN
Status: DISCONTINUED | OUTPATIENT
Start: 2021-03-05 | End: 2021-03-12 | Stop reason: HOSPADM

## 2021-03-05 RX ORDER — HALOPERIDOL 5 MG/ML
2.5 INJECTION INTRAMUSCULAR
Status: DISCONTINUED | OUTPATIENT
Start: 2021-03-05 | End: 2021-03-12 | Stop reason: HOSPADM

## 2021-03-05 RX ORDER — LORAZEPAM 2 MG/ML
1 INJECTION INTRAMUSCULAR
Status: DISCONTINUED | OUTPATIENT
Start: 2021-03-05 | End: 2021-03-12 | Stop reason: HOSPADM

## 2021-03-05 RX ORDER — BISACODYL 10 MG
10 SUPPOSITORY, RECTAL RECTAL DAILY PRN
Status: DISCONTINUED | OUTPATIENT
Start: 2021-03-05 | End: 2021-03-12 | Stop reason: HOSPADM

## 2021-03-05 RX ORDER — HYDROXYZINE HYDROCHLORIDE 25 MG/1
25 TABLET, FILM COATED ORAL
Status: DISCONTINUED | OUTPATIENT
Start: 2021-03-05 | End: 2021-03-12 | Stop reason: HOSPADM

## 2021-03-05 RX ORDER — LORAZEPAM 2 MG/ML
1 INJECTION INTRAMUSCULAR EVERY 4 HOURS PRN
Status: DISCONTINUED | OUTPATIENT
Start: 2021-03-05 | End: 2021-03-12 | Stop reason: HOSPADM

## 2021-03-05 RX ORDER — IBUPROFEN 400 MG/1
400 TABLET ORAL EVERY 6 HOURS PRN
Status: DISCONTINUED | OUTPATIENT
Start: 2021-03-05 | End: 2021-03-06

## 2021-03-05 RX ORDER — OMEPRAZOLE 20 MG/1
20 CAPSULE, DELAYED RELEASE ORAL DAILY
COMMUNITY
End: 2021-12-09 | Stop reason: ALTCHOICE

## 2021-03-05 RX ORDER — MINERAL OIL AND PETROLATUM 150; 830 MG/G; MG/G
1 OINTMENT OPHTHALMIC
Status: CANCELLED | OUTPATIENT
Start: 2021-03-05

## 2021-03-05 RX ORDER — MELATONIN
1000 DAILY
COMMUNITY

## 2021-03-05 RX ORDER — LORAZEPAM 2 MG/ML
2 INJECTION INTRAMUSCULAR
Status: CANCELLED | OUTPATIENT
Start: 2021-03-05

## 2021-03-05 RX ORDER — HALOPERIDOL 5 MG/ML
5 INJECTION INTRAMUSCULAR
Status: DISCONTINUED | OUTPATIENT
Start: 2021-03-05 | End: 2021-03-12 | Stop reason: HOSPADM

## 2021-03-05 RX ORDER — LORAZEPAM 1 MG/1
1 TABLET ORAL
Status: CANCELLED | OUTPATIENT
Start: 2021-03-05

## 2021-03-05 RX ORDER — HALOPERIDOL 1 MG/1
1 TABLET ORAL EVERY 6 HOURS PRN
Status: CANCELLED | OUTPATIENT
Start: 2021-03-05

## 2021-03-05 RX ORDER — TRAZODONE HYDROCHLORIDE 50 MG/1
50 TABLET ORAL
Status: DISCONTINUED | OUTPATIENT
Start: 2021-03-05 | End: 2021-03-12 | Stop reason: HOSPADM

## 2021-03-05 RX ORDER — HYDROXYZINE HYDROCHLORIDE 25 MG/1
25 TABLET, FILM COATED ORAL
Status: CANCELLED | OUTPATIENT
Start: 2021-03-05

## 2021-03-05 RX ORDER — BENZTROPINE MESYLATE 1 MG/ML
1 INJECTION INTRAMUSCULAR; INTRAVENOUS
Status: DISCONTINUED | OUTPATIENT
Start: 2021-03-05 | End: 2021-03-12 | Stop reason: HOSPADM

## 2021-03-05 RX ORDER — IBUPROFEN 600 MG/1
600 TABLET ORAL EVERY 8 HOURS PRN
Status: DISCONTINUED | OUTPATIENT
Start: 2021-03-05 | End: 2021-03-12 | Stop reason: HOSPADM

## 2021-03-05 RX ORDER — HYDROXYZINE HYDROCHLORIDE 25 MG/1
50 TABLET, FILM COATED ORAL
Status: CANCELLED | OUTPATIENT
Start: 2021-03-05

## 2021-03-05 RX ORDER — LORAZEPAM 2 MG/ML
1 INJECTION INTRAMUSCULAR
Status: CANCELLED | OUTPATIENT
Start: 2021-03-05

## 2021-03-05 RX ORDER — BENZTROPINE MESYLATE 1 MG/ML
0.5 INJECTION INTRAMUSCULAR; INTRAVENOUS
Status: DISCONTINUED | OUTPATIENT
Start: 2021-03-05 | End: 2021-03-12 | Stop reason: HOSPADM

## 2021-03-05 RX ORDER — HALOPERIDOL 5 MG/ML
5 INJECTION INTRAMUSCULAR
Status: CANCELLED | OUTPATIENT
Start: 2021-03-05

## 2021-03-05 RX ORDER — LORAZEPAM 2 MG/ML
2 INJECTION INTRAMUSCULAR
Status: DISCONTINUED | OUTPATIENT
Start: 2021-03-05 | End: 2021-03-12 | Stop reason: HOSPADM

## 2021-03-05 RX ORDER — BENZTROPINE MESYLATE 1 MG/ML
0.5 INJECTION INTRAMUSCULAR; INTRAVENOUS
Status: CANCELLED | OUTPATIENT
Start: 2021-03-05

## 2021-03-05 RX ORDER — AMOXICILLIN 250 MG
1 CAPSULE ORAL DAILY PRN
Status: DISCONTINUED | OUTPATIENT
Start: 2021-03-05 | End: 2021-03-12 | Stop reason: HOSPADM

## 2021-03-05 RX ORDER — ACETAMINOPHEN 325 MG/1
650 TABLET ORAL EVERY 6 HOURS PRN
Status: CANCELLED | OUTPATIENT
Start: 2021-03-05

## 2021-03-05 RX ORDER — LORAZEPAM 1 MG/1
1 TABLET ORAL
Status: DISCONTINUED | OUTPATIENT
Start: 2021-03-05 | End: 2021-03-12 | Stop reason: HOSPADM

## 2021-03-05 RX ORDER — HALOPERIDOL 5 MG
2.5 TABLET ORAL
Status: DISCONTINUED | OUTPATIENT
Start: 2021-03-05 | End: 2021-03-12 | Stop reason: HOSPADM

## 2021-03-05 RX ORDER — HYDROXYZINE 50 MG/1
50 TABLET, FILM COATED ORAL
Status: DISCONTINUED | OUTPATIENT
Start: 2021-03-05 | End: 2021-03-12 | Stop reason: HOSPADM

## 2021-03-05 RX ORDER — IBUPROFEN 600 MG/1
600 TABLET ORAL EVERY 8 HOURS PRN
Status: CANCELLED | OUTPATIENT
Start: 2021-03-05

## 2021-03-05 RX ORDER — AMOXICILLIN 250 MG
1 CAPSULE ORAL DAILY PRN
Status: CANCELLED | OUTPATIENT
Start: 2021-03-05

## 2021-03-05 RX ORDER — BENZTROPINE MESYLATE 1 MG/ML
1 INJECTION INTRAMUSCULAR; INTRAVENOUS
Status: CANCELLED | OUTPATIENT
Start: 2021-03-05

## 2021-03-05 RX ORDER — BENZTROPINE MESYLATE 0.5 MG/1
1 TABLET ORAL
Status: CANCELLED | OUTPATIENT
Start: 2021-03-05

## 2021-03-05 RX ORDER — HALOPERIDOL 5 MG
5 TABLET ORAL
Status: CANCELLED | OUTPATIENT
Start: 2021-03-05

## 2021-03-05 RX ORDER — TRAZODONE HYDROCHLORIDE 50 MG/1
50 TABLET ORAL
Status: CANCELLED | OUTPATIENT
Start: 2021-03-05

## 2021-03-05 RX ORDER — MAGNESIUM HYDROXIDE/ALUMINUM HYDROXICE/SIMETHICONE 120; 1200; 1200 MG/30ML; MG/30ML; MG/30ML
30 SUSPENSION ORAL EVERY 4 HOURS PRN
Status: CANCELLED | OUTPATIENT
Start: 2021-03-05

## 2021-03-05 RX ORDER — MINERAL OIL AND PETROLATUM 150; 830 MG/G; MG/G
1 OINTMENT OPHTHALMIC
Status: DISCONTINUED | OUTPATIENT
Start: 2021-03-05 | End: 2021-03-12 | Stop reason: HOSPADM

## 2021-03-05 RX ORDER — HALOPERIDOL 5 MG/ML
2.5 INJECTION INTRAMUSCULAR
Status: CANCELLED | OUTPATIENT
Start: 2021-03-05

## 2021-03-05 RX ADMIN — METFORMIN HYDROCHLORIDE 500 MG: 500 TABLET ORAL at 14:41

## 2021-03-05 RX ADMIN — SERTRALINE HYDROCHLORIDE 200 MG: 100 TABLET ORAL at 14:41

## 2021-03-05 NOTE — ED NOTES
Requested COVID test   Patient requesting food and may need accucheck  Medical staff advised  Referral sent to Intake for consideration for placement at [facility]

## 2021-03-05 NOTE — ED NOTES
Patient is accepted at [facility]  Patient is accepted by Shamar Collins with Attending, Dr Akil HARMAN  Transportation is arranged with [facility]  Transportation is scheduled for TBD  Patient may go to the floor after receiving Metformin and check blood sugar  Nurse report is to be called to [phone] prior to patient transfer

## 2021-03-05 NOTE — ED NOTES
Per Intake, transport can be set up for 2230 or later  Call to [facility]  Ed will work on arrangements and call back with details

## 2021-03-05 NOTE — ED NOTES
Patient is a 36year old female presents to ED with her mother due to increase in anxiety with suicidal ideas and a plan  Reports that she has long history of anxiety and depression, which has been managed by her PCP with prescription Zoloft, but she has had no formal mental health treatment as an outpatient  She had one previous admission to [facility] in 2013 following an overdose of Zoloft which required charcoal   She reports difficulty obtaining therapy appointments and then gives up  She presents with poor eye contact, hopeslessness, helplessness, and depressive feelings with loss of interest, energy, motivation  She is withdrawn socially, feels she can't focus or function at work  Reports she has no interest in tending to household activities and her room is "a mess"  She feels the anxiety is overwhelming as she thinks constantly about "what ifs" and worries about her taxes and getting in trouble for a variety of things  She reports adequate sleep  Reports she does not feel like eating, but does because she knows she has to because of her diabetes  She reports 10lb weight loss over past month  She denies auditory and visual hallucinations  No delusions or paranoia  No homicidal ideas, plan, intent, or violence to others  She is calm and cooperative  She admits that she has had suicidal thoughts recently  Reports this is worse since Tuesday with no identifiable stressors  She reports that she feels dying would be the only way to alleviate the anxiety  Today she went for a long walk and contemplated jumping off something high  Seems reluctant to say if she has a specific plan for where she would jump  Patient's mother agrees that she needs inpatient treatment  201 completed  Advised of rights and 72 hour notice with documentation served

## 2021-03-05 NOTE — ED NOTES
Pt belongings were moved to locker 20 in the secured holding area       [facility]  03/05/21 [id number]

## 2021-03-05 NOTE — ED NOTES
Fingerstick Glucose: 90     [facility]  03/05/21 7611

## 2021-03-05 NOTE — ED PROVIDER NOTES
History  Chief Complaint   Patient presents with    Psychiatric Evaluation     pt state having anxiety and SI started a few days ago, states felt like this in past     49-year-old female presents today reporting anxiety to the point where she states I Want to kill myself  She said symptoms started 4 days ago had a been getting progressively worse  Called her PCP this morning it is no one is in the office today so she came to the emergency department  She denies homicidal ideation, denies auditory or visual hallucinations  Denies drug abuse  History provided by:  Patient  Psychiatric Evaluation  Presenting symptoms: depression and suicidal thoughts    Presenting symptoms: no hallucinations and no homicidal ideas    Patient accompanied by:  Parent  Degree of incapacity (severity): Unable to specify  Onset quality:  Gradual  Timing:  Constant  Progression:  Worsening  Chronicity:  Recurrent  Treatment compliance:  Untreated  Relieved by:  None tried  Worsened by:  Nothing  Ineffective treatments:  None tried  Associated symptoms: anxiety    Associated symptoms: no abdominal pain, no appetite change, no chest pain, no decreased need for sleep, no fatigue, no feelings of worthlessness, no headaches, no hypersomnia and no hyperventilation    Risk factors: hx of mental illness        Prior to Admission Medications   Prescriptions Last Dose Informant Patient Reported? Taking?    Blood Glucose Monitoring Suppl (ACCU-CHEK GUIDE) w/Device KIT  Self Yes No   Sig: daily Use as directed   FEROSUL 325 (65 Fe) MG tablet Past Week at Unknown time Self No Yes   Sig: Take 1 tablet (325 mg total) by mouth 2 (two) times a day with meals   Lancets (ACCU-CHEK SOFT TOUCH) lancets  Self No No   Sig: Use as instructed once daily   Psyllium (METAMUCIL FIBER PO) Not Taking at Unknown time Self Yes No   Sig: Take by mouth as needed   cholecalciferol (VITAMIN D3) 1,000 units tablet 3/4/2021 at Unknown time Self Yes Yes   Sig: Take 1,000 Units by mouth daily   docusate sodium (COLACE) 100 mg capsule Past Week at Unknown time Self Yes Yes   Sig: Take 100 mg by mouth 2 (two) times a day   glucose blood test strip  Self No No   Sig: use 1 TEST STRIP to TEST BLOOD SUGAR once daily   metFORMIN (GLUCOPHAGE) 500 mg tablet 3/4/2021 at Unknown time Self No Yes   Sig: Take 1 tablet (500 mg total) by mouth 2 (two) times a day with meals   omeprazole (PriLOSEC) 20 mg delayed release capsule 3/4/2021 at Unknown time Self Yes Yes   Sig: Take 20 mg by mouth daily   sertraline (ZOLOFT) 100 mg tablet 3/4/2021 at Unknown time Self No Yes   Sig: take 2 tablets by mouth once daily   Patient taking differently: Take 200 mg by mouth daily in the early morning       Facility-Administered Medications: None       Past Medical History:   Diagnosis Date    Anemia     Anxiety     Depression     Diabetes ([facility] )     Endometrial cancer ([facility] ) 4/2/2020    Initial path: A  Uterus, Endometrium, Biopsy: - Consistent with atypical polypoid adenomyoma  See Note    B  Uterus, Cervical Mass, Excision: - Consistent with atypical polypoid adenomyoma  See Note    C  Uterine Cervical Mass, Excision: - Endometrioid adenocarcinoma, FIGO Grade I, 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   See Note    GERD (gastroesophageal reflux disease)     History of transfusion 08/2019-3/14/20    Syncope     Due to blood loss    Uterine cancer ([facility] ) 2019       Past Surgical History:   Procedure Laterality Date    HYSTERECTOMY  2019    NO PAST SURGERIES      OOPHORECTOMY  2019    MT LAPAROSCOPY W TOT HYSTERECTUTERUS <=250 GRAM  W TUBE/OVARY N/A 4/16/2020    Procedure: ROBOTIC TOTAL LAPAROSCOPIC HYSTERECTOMY, BILATERAL SALPINGO-OOPHORECTOMY, SENTINEL LYMPH NODE BIOPSY;  Surgeon: Beto Sharpe MD;  Location[facility];  Service: Gynecology Oncology       Family History   Problem Relation Age of Onset    Hypertension Mother     No Known Problems Father     Lung cancer Maternal Grandfather  No Known Problems Brother     Cancer Maternal Aunt     No Known Problems Maternal Aunt     No Known Problems Maternal Aunt     No Known Problems Paternal Aunt      I have reviewed and agree with the history as documented  E-Cigarette/Vaping    E-Cigarette Use Never User      E-Cigarette/Vaping Substances    Nicotine No     THC No     CBD No     Flavoring No     Other No     Unknown No      Social History     Tobacco Use    Smoking status: Never Smoker    Smokeless tobacco: Never Used   Substance Use Topics    Alcohol use: Yes     Frequency: Monthly or less     Drinks per session: 1 or 2     Comment: couple times per year-holidays    Drug use: No       Review of Systems   Constitutional: Negative for appetite change, fatigue and fever  HENT: Negative for congestion  Eyes: Negative for visual disturbance  Respiratory: Negative for chest tightness and shortness of breath  Cardiovascular: Negative for chest pain and leg swelling  Gastrointestinal: Negative for abdominal pain  Genitourinary: Negative for difficulty urinating  Musculoskeletal: Negative for back pain  Skin: Negative for pallor, rash and wound  Neurological: Negative for dizziness and headaches  Psychiatric/Behavioral: Positive for suicidal ideas  Negative for hallucinations and homicidal ideas  The patient is nervous/anxious  Physical Exam  Physical Exam  Vitals signs and nursing note reviewed  Constitutional:       Appearance: She is well-developed  HENT:      Head: Normocephalic and atraumatic  Mouth/Throat:      Mouth: Mucous membranes are moist       Pharynx: Uvula midline  Tonsils: No tonsillar exudate  Eyes:      Pupils: Pupils are equal, round, and reactive to light  Neck:      Musculoskeletal: Normal range of motion and neck supple  Cardiovascular:      Rate and Rhythm: Normal rate and regular rhythm     Pulmonary:      Effort: Pulmonary effort is normal       Breath sounds: Normal breath sounds  Abdominal:      General: Bowel sounds are normal       Palpations: Abdomen is soft  Tenderness: There is no abdominal tenderness  There is no guarding or rebound  Musculoskeletal:         General: No tenderness or deformity  Skin:     General: Skin is warm and dry  Capillary Refill: Capillary refill takes less than 2 seconds  Neurological:      General: No focal deficit present  Mental Status: She is alert and oriented to person, place, and time  Comments: Patient moving all extremities equally, no focal neuro deficits noted  Psychiatric:         Mood and Affect: Mood normal          Behavior: Behavior normal          Vital Signs  ED Triage Vitals   Temperature Pulse Respirations Blood Pressure SpO2   03/05/21 0851 03/05/21 0851 03/05/21 0851 03/05/21 0851 03/05/21 0851   98 1 °F (36 7 °C) 96 18 170/76 98 %      Temp Source Heart Rate Source Patient Position - Orthostatic VS BP Location FiO2 (%)   03/05/21 0851 03/05/21 1354 03/05/21 1354 03/05/21 1354 --   Oral Monitor Sitting Left arm       Pain Score       03/05/21 0851       No Pain           Vitals:    03/05/21 0851 03/05/21 0900 03/05/21 1354   BP: 170/76 170/76 146/75   Pulse: 96  79   Patient Position - Orthostatic VS:   Sitting         Visual Acuity      ED Medications  Medications   metFORMIN (GLUCOPHAGE) tablet 500 mg (has no administration in time range)   sertraline (ZOLOFT) tablet 200 mg (has no administration in time range)       Diagnostic Studies  Results Reviewed     Procedure Component Value Units Date/Time    COVID19, Influenza A/B, RSV PCR, [facility] [[id number]]  (Normal) Collected: 03/05/21 1121    Lab Status: Final result Specimen: Nares from Nose Updated: 03/05/21 1235     SARS-CoV-2 Negative     INFLUENZA A PCR Negative     INFLUENZA B PCR Negative     RSV PCR Negative    Narrative:        This test has been authorized by FDA under an EUA (Emergency Use Assay) for use by authorized laboratories  Clinical caution and judgement should be used with the interpretation of these results with consideration of the clinical impression and other laboratory testing  Testing reported as "Positive" or "Negative" has been proven to be accurate according to standard laboratory validation requirements  All testing is performed with control materials showing appropriate reactivity at standard intervals  POCT alcohol breath test [[id number]]  (Normal) Resulted: 03/05/21 1112    Lab Status: Final result Updated: 03/05/21 1112     EXTBreath Alcohol 0 00    Urine Microscopic [[id number]]  (Abnormal) Collected: 03/05/21 0913    Lab Status: Final result Specimen: Urine, Clean Catch Updated: 03/05/21 0933     RBC, UA 0-1 /hpf      WBC, UA 1-2 /hpf      Epithelial Cells Occasional /hpf      Bacteria, UA Moderate /hpf      MUCUS THREADS Occasional    POCT pregnancy, urine [[id number]]  (Normal) Resulted: 03/05/21 0916    Lab Status: Final result Updated: 03/05/21 0917     EXT PREG TEST UR (Ref: Negative) negative     Control valid    Urine Macroscopic, POC [[id number]]  (Abnormal) Collected: 03/05/21 0913    Lab Status: Final result Specimen: Urine Updated: 03/05/21 0915     Color, UA Yellow     Clarity, UA Clear     pH, UA 6 0     Leukocytes, UA Trace     Nitrite, UA Negative     Protein, UA 30 (1+) mg/dl      Glucose, UA Negative mg/dl      Ketones, UA Trace mg/dl      Urobilinogen, UA 0 2 E U /dl      Bilirubin, UA Negative     Blood, UA Negative     Specific Gravity, UA 1 025    Narrative:      CLINITEK RESULT                 No orders to display              Procedures  Procedures         ED Course                                           MDM  Number of Diagnoses or Management Options  Depression, unspecified depression type: new and requires workup  Suicidal ideations: new and requires workup  Diagnosis management comments: 10:20 AM  201 signed  COVID swab ordered  Bed search initiated by crisis       11:14 AM The patient is medically cleared for crisis evaluation and potential psychiatric admission  1:54 PM  Patient accepted at [facility] on Dr Yohan Abebe service  EMTALA paperwork completed  Amount and/or Complexity of Data Reviewed  Clinical lab tests: ordered and reviewed  Tests in the medicine section of CPT®: ordered and reviewed  Review and summarize past medical records: yes  Discuss the patient with other providers: yes  Independent visualization of images, tracings, or specimens: yes    Risk of Complications, Morbidity, and/or Mortality  Presenting problems: high  Diagnostic procedures: high  Management options: high        Disposition  Final diagnoses:   Depression, unspecified depression type   Suicidal ideations     Time reflects when diagnosis was documented in both MDM as applicable and the Disposition within this note     Time User Action Codes Description Comment    3/5/2021 10:20 AM Jai Quesada Add [F32 9] Depression, unspecified depression type     3/5/2021 10:20 AM Batool Glaser Add [W88 525] Suicidal ideations       ED Disposition     ED Disposition Condition Date/Time Comment    Transfer to [address] [place] [address] Mar 5, 2021  1:53 PM Joe Mccoy should be transferred out to [address] [place] [address] and has been medically cleared for psychiatric admission  MD Documentation      Most Recent Value   Sending MD Dr Jairo Fernández    None         Patient's Medications   Discharge Prescriptions    No medications on file     No discharge procedures on file      PDMP Review       Value Time User    PDMP Reviewed  Yes 4/16/2020  6:57 AM Angelina Snyder MD          ED Provider  Electronically Signed by           Shaunna Thurston DO  03/05/21 3840

## 2021-03-05 NOTE — EMTALA/ACUTE CARE TRANSFER
[address] [place] [address]  Dept: [phone]      EMTALA TRANSFER CONSENT    NAME Oseas Alberts                                         1980                              MRN [id number]    I have been informed of my rights regarding examination, treatment, and transfer   by Dr Singh Haddad DO    Benefits:      Risks:        Transfer Request   I acknowledge that my medical condition has been evaluated and explained to me by the emergency department physician or other qualified medical person and/or my attending physician who has recommended and offered to me further medical examination and treatment  I understand the Hospital's obligation with respect to the treatment and stabilization of my emergency medical condition  I nevertheless request to be transferred  I release the Hospital, the doctor, and any other persons caring for me from all responsibility or liability for any injury or ill effects that may result from my transfer and agree to accept all responsibility for the consequences of my choice to transfer, rather than receive stabilizing treatment at the Hospital  I understand that because the transfer is my request, my insurance may not provide reimbursement for the services  The Hospital will assist and direct me and my family in how to make arrangements for transfer, but the hospital is not liable for any fees charged by the transport service  In spite of this understanding, I refuse to consent to further medical examination and treatment which has been offered to me, and request transfer to    I authorize the performance of emergency medical procedures and treatments upon me in both transit and upon arrival at the receiving facility  Additionally, I authorize the release of any and all medical records to the receiving facility and request they be transported with me, if possible      I authorize the performance of emergency medical procedures and treatments upon me in both transit and upon arrival at the receiving facility  Additionally, I authorize the release of any and all medical records to the receiving facility and request they be transported with me, if possible  I understand that the safest mode of transportation during a medical emergency is an ambulance and that the Hospital advocates the use of this mode of transport  Risks of traveling to the receiving facility by car, including absence of medical control, life sustaining equipment, such as oxygen, and medical personnel has been explained to me and I fully understand them  (KAREL CORRECT BOX BELOW)  [  ]  I consent to the stated transfer and to be transported by ambulance/helicopter  [  ]  I consent to the stated transfer, but refuse transportation by ambulance and accept full responsibility for my transportation by car  I understand the risks of non-ambulance transfers and I exonerate the Hospital and its staff from any deterioration in my condition that results from this refusal     X___________________________________________    DATE  21  TIME________  Signature of patient or legally responsible individual signing on patient behalf           RELATIONSHIP TO PATIENT_________________________          Provider Certification    NAME [facility] OSWALD [facility] 1980                              MRN [id number]    A medical screening exam was performed on the above named patient  Based on the examination:    Condition Necessitating Transfer The primary encounter diagnosis was Depression, unspecified depression type  A diagnosis of Suicidal ideations was also pertinent to this visit      Patient Condition:      Reason for Transfer:      Transfer Requirements: Facility     · Space available and qualified personnel available for treatment as acknowledged by    · Agreed to accept transfer and to provide appropriate medical treatment as acknowledged by          · Appropriate medical records of the examination and treatment of the patient are provided at the time of transfer   [facility], [address] _______  · Transfer will be performed by qualified personnel from    and appropriate transfer equipment as required, including the use of necessary and appropriate life support measures  Provider Certification: I have examined the patient and explained the following risks and benefits of being transferred/refusing transfer to the patient/family:         Based on these reasonable risks and benefits to the patient and/or the unborn child(hsaina), and based upon the information available at the time of the patients examination, I certify that the medical benefits reasonably to be expected from the provision of appropriate medical treatments at another medical facility outweigh the increasing risks, if any, to the individuals medical condition, and in the case of labor to the unborn child, from effecting the transfer      X____________________________________________ DATE 03/05/21        TIME_______      ORIGINAL - SEND TO MEDICAL RECORDS   COPY - SEND WITH PATIENT DURING TRANSFER

## 2021-03-05 NOTE — ED NOTES
Insurance Authorization for admission:   Phone call placed to [facility]  Phone number: [phone]  Spoke to [employer]  3 days approved  Level of care: inpatient mental health 201  Review on: Monday 3/8/21  Authorization #: accepting facility to call upon arrival for authorization number

## 2021-03-05 NOTE — TELEPHONE ENCOUNTER
PT CALLED CRYING  SHE STATES HER ANXIETY AND DEPRESSION ARE REALLY BAD, SHE CANT FUNCTION  STATES SHE IS NOT EATING AND HAS THOUGHTS OF HARMING HERSELF  I INSTRUCTED PT TO ER FOR PSYCH EVAL    SHE SAID SHE WILL GO TO [facility]

## 2021-03-05 NOTE — ED NOTES
Taking over observation, pt in room laying in bed   Will continue observation     270 Sara Pedroza  03/05/21 9927

## 2021-03-06 PROBLEM — F33.2 SEVERE EPISODE OF RECURRENT MAJOR DEPRESSIVE DISORDER, WITHOUT PSYCHOTIC FEATURES (HCC): Status: ACTIVE | Noted: 2021-03-06

## 2021-03-06 PROBLEM — F41.1 GENERALIZED ANXIETY DISORDER: Status: ACTIVE | Noted: 2021-03-06

## 2021-03-06 PROBLEM — Z00.8 MEDICAL CLEARANCE FOR PSYCHIATRIC ADMISSION: Status: ACTIVE | Noted: 2021-03-06

## 2021-03-06 LAB
ALBUMIN SERPL BCP-MCNC: 3.7 G/DL (ref 3.5–5)
ALP SERPL-CCNC: 70 U/L (ref 46–116)
ALT SERPL W P-5'-P-CCNC: 76 U/L (ref 12–78)
ANION GAP SERPL CALCULATED.3IONS-SCNC: 9 MMOL/L (ref 4–13)
AST SERPL W P-5'-P-CCNC: 28 U/L (ref 5–45)
BASOPHILS # BLD AUTO: 0.04 THOUSANDS/ΜL (ref 0–0.1)
BASOPHILS NFR BLD AUTO: 1 % (ref 0–1)
BILIRUB SERPL-MCNC: 0.4 MG/DL (ref 0.2–1)
BUN SERPL-MCNC: 21 MG/DL (ref 5–25)
CALCIUM SERPL-MCNC: 9.2 MG/DL (ref 8.3–10.1)
CHLORIDE SERPL-SCNC: 105 MMOL/L (ref 100–108)
CHOLEST SERPL-MCNC: 172 MG/DL (ref 50–200)
CO2 SERPL-SCNC: 28 MMOL/L (ref 21–32)
CREAT SERPL-MCNC: 0.75 MG/DL (ref 0.6–1.3)
EOSINOPHIL # BLD AUTO: 0.14 THOUSAND/ΜL (ref 0–0.61)
EOSINOPHIL NFR BLD AUTO: 2 % (ref 0–6)
ERYTHROCYTE [DISTWIDTH] IN BLOOD BY AUTOMATED COUNT: 13.4 % (ref 11.6–15.1)
EST. AVERAGE GLUCOSE BLD GHB EST-MCNC: 105 MG/DL
GFR SERPL CREATININE-BSD FRML MDRD: 100 ML/MIN/1.73SQ M
GLUCOSE SERPL-MCNC: 76 MG/DL (ref 65–140)
HBA1C MFR BLD: 5.3 %
HCG SERPL QL: NEGATIVE
HCT VFR BLD AUTO: 49.1 % (ref 34.8–46.1)
HDLC SERPL-MCNC: 29 MG/DL
HGB BLD-MCNC: 15.8 G/DL (ref 11.5–15.4)
IMM GRANULOCYTES # BLD AUTO: 0.02 THOUSAND/UL (ref 0–0.2)
IMM GRANULOCYTES NFR BLD AUTO: 0 % (ref 0–2)
LDLC SERPL CALC-MCNC: 119 MG/DL (ref 0–100)
LYMPHOCYTES # BLD AUTO: 1.51 THOUSANDS/ΜL (ref 0.6–4.47)
LYMPHOCYTES NFR BLD AUTO: 25 % (ref 14–44)
MCH RBC QN AUTO: 29.2 PG (ref 26.8–34.3)
MCHC RBC AUTO-ENTMCNC: 32.2 G/DL (ref 31.4–37.4)
MCV RBC AUTO: 91 FL (ref 82–98)
MONOCYTES # BLD AUTO: 0.53 THOUSAND/ΜL (ref 0.17–1.22)
MONOCYTES NFR BLD AUTO: 9 % (ref 4–12)
NEUTROPHILS # BLD AUTO: 3.92 THOUSANDS/ΜL (ref 1.85–7.62)
NEUTS SEG NFR BLD AUTO: 63 % (ref 43–75)
NONHDLC SERPL-MCNC: 143 MG/DL
NRBC BLD AUTO-RTO: 0 /100 WBCS
PLATELET # BLD AUTO: 193 THOUSANDS/UL (ref 149–390)
PMV BLD AUTO: 10.2 FL (ref 8.9–12.7)
POTASSIUM SERPL-SCNC: 3.6 MMOL/L (ref 3.5–5.3)
PROT SERPL-MCNC: 7.5 G/DL (ref 6.4–8.2)
RBC # BLD AUTO: 5.42 MILLION/UL (ref 3.81–5.12)
SODIUM SERPL-SCNC: 142 MMOL/L (ref 136–145)
TRIGL SERPL-MCNC: 120 MG/DL
TSH SERPL DL<=0.05 MIU/L-ACNC: 2.27 UIU/ML (ref 0.36–3.74)
WBC # BLD AUTO: 6.16 THOUSAND/UL (ref 4.31–10.16)

## 2021-03-06 PROCEDURE — 80053 COMPREHEN METABOLIC PANEL: CPT | Performed by: PHYSICIAN ASSISTANT

## 2021-03-06 PROCEDURE — 84703 CHORIONIC GONADOTROPIN ASSAY: CPT | Performed by: PHYSICIAN ASSISTANT

## 2021-03-06 PROCEDURE — 99222 1ST HOSP IP/OBS MODERATE 55: CPT | Performed by: PSYCHIATRY & NEUROLOGY

## 2021-03-06 PROCEDURE — 86592 SYPHILIS TEST NON-TREP QUAL: CPT | Performed by: PHYSICIAN ASSISTANT

## 2021-03-06 PROCEDURE — 85025 COMPLETE CBC W/AUTO DIFF WBC: CPT | Performed by: PHYSICIAN ASSISTANT

## 2021-03-06 PROCEDURE — 80061 LIPID PANEL: CPT | Performed by: PHYSICIAN ASSISTANT

## 2021-03-06 PROCEDURE — 84443 ASSAY THYROID STIM HORMONE: CPT | Performed by: PHYSICIAN ASSISTANT

## 2021-03-06 PROCEDURE — 83036 HEMOGLOBIN GLYCOSYLATED A1C: CPT | Performed by: PHYSICIAN ASSISTANT

## 2021-03-06 PROCEDURE — 99254 IP/OBS CNSLTJ NEW/EST MOD 60: CPT | Performed by: PHYSICIAN ASSISTANT

## 2021-03-06 PROCEDURE — 3044F HG A1C LEVEL LT 7.0%: CPT | Performed by: OBSTETRICS & GYNECOLOGY

## 2021-03-06 RX ORDER — SERTRALINE HYDROCHLORIDE 100 MG/1
200 TABLET, FILM COATED ORAL DAILY
Status: DISCONTINUED | OUTPATIENT
Start: 2021-03-06 | End: 2021-03-12 | Stop reason: HOSPADM

## 2021-03-06 RX ORDER — BUSPIRONE HYDROCHLORIDE 5 MG/1
5 TABLET ORAL 2 TIMES DAILY
Status: DISCONTINUED | OUTPATIENT
Start: 2021-03-06 | End: 2021-03-08

## 2021-03-06 RX ORDER — PANTOPRAZOLE SODIUM 20 MG/1
20 TABLET, DELAYED RELEASE ORAL
Status: DISCONTINUED | OUTPATIENT
Start: 2021-03-06 | End: 2021-03-12 | Stop reason: HOSPADM

## 2021-03-06 RX ORDER — MELATONIN
1000 DAILY
Status: DISCONTINUED | OUTPATIENT
Start: 2021-03-06 | End: 2021-03-12 | Stop reason: HOSPADM

## 2021-03-06 RX ADMIN — Medication 1000 UNITS: at 10:39

## 2021-03-06 RX ADMIN — BUSPIRONE HYDROCHLORIDE 5 MG: 5 TABLET ORAL at 17:05

## 2021-03-06 RX ADMIN — SERTRALINE HYDROCHLORIDE 200 MG: 100 TABLET ORAL at 10:39

## 2021-03-06 RX ADMIN — PANTOPRAZOLE SODIUM 20 MG: 20 TABLET, DELAYED RELEASE ORAL at 10:39

## 2021-03-06 RX ADMIN — BUSPIRONE HYDROCHLORIDE 5 MG: 5 TABLET ORAL at 10:39

## 2021-03-06 RX ADMIN — METFORMIN HYDROCHLORIDE 500 MG: 500 TABLET ORAL at 16:34

## 2021-03-06 NOTE — PROGRESS NOTES
254-1    Patient arrived to the unit with the following    Huy Cower bag-1    Pajama set   Shirt-2  pants-1  Hooded sweatshirt  Shoes  Hairbrush  Toothpaste  Hair ties-2  Tooth brush    [employer] paperwork  receipts

## 2021-03-06 NOTE — TREATMENT TEAM
03/06/21 0700   Team Meeting   Meeting Type Daily Rounds   Team Members Present   Team Members Present Physician;Nurse   Physician Team Member Dr Kianna Esteves   Nursing Team Member 31 Sara Gomes   Patient/Family Present   Patient Present No   Patient's Family Present No   Daily Rounds: Pt new admit on 201 from [facility] ED having SI  DM type 2 managed with diet and Metformin  Denies sub abuse, no UDS in ED so will order now  Hx of SA in 2013 with INPT stay at [address] [place] [address] reports taking Zoloft 200mg  Denies SI/HI/AVH on admit

## 2021-03-06 NOTE — TREATMENT PLAN
TREATMENT PLAN REVIEW - [address] 36 y o  1980 female MRN: [id number]    [address] [place] [address] Room / Bed: [facility] 254/[facility] Encounter: [id number]          Admit Date/Time:  3/5/2021 10:53 PM    Treatment Team: Attending Provider: Cole Gale MD; Patient Care Technician: Ginger Shaver; Patient Care Technician: Hollis Birmingham; Registered Nurse: Mara Teresa RN; Registered Nurse: Suzanna Castaneda RN; Registered Nurse: Pravin Carbajal LPN; Patient Care Assistant: Marcos Justice; Security: Ciara Friend;  Occupational Therapy Assistant: PATTIE Moore    Diagnosis: Principal Problem:    Severe episode of recurrent major depressive disorder, without psychotic features ([facility] )  Active Problems:    Iron deficiency anemia due to chronic blood loss    Type 2 diabetes mellitus without complication, without long-term current use of insulin (HCC)    Hypertension, essential    Surgical menopause    Medical clearance for psychiatric admission    Generalized anxiety disorder      Patient Strengths/Assets: ability for insight, average or above intelligence, capable of independent living, communication skills, compliant with medication, family ties, patient is on a voluntary commitment, patient is willing to work on problems, well educated, work skills    Patient Barriers/Limitations: difficulty adapting, limited motivation, low self esteem, poor physical health    Short Term Goals: decrease in depressive symptoms, decrease in anxiety symptoms, decrease in suicidal thoughts    Long Term Goals: improvement in depression, improvement in anxiety, free of suicidal thoughts, able to express basic needs, acceptance of need for psychiatric follow up after discharge    Progress Towards Goals: continue psychiatric medications as prescribed    Recommended Treatment: medication management, patient medication education, group therapy, milieu therapy, continued Behavioral Health psychiatric evaluation/assessment process    Treatment Frequency: daily medication monitoring, group and milieu therapy daily, monitoring through interdisciplinary rounds, monitoring through weekly patient care conferences    Expected Discharge Date:  3-5 days    Discharge Plan: referrals as indicated, return to previous living arrangement    Treatment Plan Created/Updated By: Nirmala Combs MD

## 2021-03-06 NOTE — ASSESSMENT & PLAN NOTE
Lab Results   Component Value Date    HGBA1C 4 9 04/06/2020       Recent Labs     03/05/21  1050 03/05/21  1350 03/05/21  2301   POCGLU 92 127 111       Blood Sugar Average: Last 72 hrs:  (P) 111   · Daily blood glucose checks  · Continue home dose metformin

## 2021-03-06 NOTE — PROGRESS NOTES
Pt calm and cooperative on unit  Visible and attending groups, social with peers  Flat affect during interaction  Denies SI/HI/AVH and reports feeling comfortable on unit

## 2021-03-06 NOTE — PROGRESS NOTES
201 from [facility]  SI to jump off something high  Pt said she woke that morning with very high anxiety that it was overwhelming to her  History of Uterine CA, Hysterectomy, DM Type 2- counts carbs- Metformin  Pt pleasant and cooperative  No UDS, pt denies drug, alcohol and smoking  Past SA by taking a handful of pills,went to ED and was sent to [facility] in 2013  Pt said that she is taking Sertraline 200 mg from that time

## 2021-03-06 NOTE — ED NOTES
Direct 1:1 sitting outside of door  I am taking over observation of secured holding  Pt in bed sleeping with the lights off        Evan Jackson  03/05/21 1920

## 2021-03-06 NOTE — PLAN OF CARE
Problem: Nutrition/Hydration-ADULT  Goal: Nutrient/Hydration intake appropriate for improving, restoring or maintaining nutritional needs  Description: Monitor and assess patient's nutrition/hydration status for malnutrition  Collaborate with interdisciplinary team and initiate plan and interventions as ordered  Monitor patient's weight and dietary intake as ordered or per policy  Utilize nutrition screening tool and intervene as necessary  Determine patient's food preferences and provide high-protein, high-caloric foods as appropriate       INTERVENTIONS:  - Monitor oral intake, urinary output, labs, and treatment plans  - Assess nutrition and hydration status and recommend course of action  - Evaluate amount of meals eaten  - Assist patient with eating if necessary   - Allow adequate time for meals  - Recommend/ encourage appropriate diets, oral nutritional supplements, and vitamin/mineral supplements  - Order, calculate, and assess calorie counts as needed  - Recommend, monitor, and adjust tube feedings and TPN/PPN based on assessed needs  - Assess need for intravenous fluids  - Provide specific nutrition/hydration education as appropriate  - Include patient/family/caregiver in decisions related to nutrition  Outcome: Progressing     Problem: SELF HARM/SUICIDALITY  Goal: Will have no self-injury during hospital stay  Description: INTERVENTIONS:  - Q 15 MINUTES: Routine safety checks  - Q WAKING SHIFT & PRN: Assess risk to determine if routine checks are adequate to maintain patient safety  - Encourage patient to participate actively in care by formulating a plan to combat response to suicidal ideation, identify supports and resources  Outcome: Progressing     Problem: DEPRESSION  Goal: Will be euthymic at discharge  Description: INTERVENTIONS:  - Administer medication as ordered  - Provide emotional support via 1:1 interaction with staff  - Encourage involvement in milieu/groups/activities  - Monitor for social isolation  Outcome: Progressing     Problem: ANXIETY  Goal: Will report anxiety at manageable levels  Description: INTERVENTIONS:  - Administer medication as ordered  - Teach and encourage coping skills  - Provide emotional support  - Assess patient/family for anxiety and ability to cope  Outcome: Progressing  Goal: By discharge: Patient will verbalize 2 strategies to deal with anxiety  Description: Interventions:  - Identify any obvious source/trigger to anxiety  - Staff will assist patient in applying identified coping technique/skills  - Encourage attendance of scheduled groups and activities  Outcome: Progressing

## 2021-03-06 NOTE — ASSESSMENT & PLAN NOTE
· CMP, CBC, hemoglobin A1c, lipid panel, RPR, TSH pending  · UDS pending  · COVID-19 negative  · EKG pending this admission  · Medically stable for continued inpatient psychiatric treatment

## 2021-03-06 NOTE — PLAN OF CARE
Problem: Ineffective Coping  Goal: Participates in unit activities  Description: Interventions:  - Provide therapeutic environment   - Provide required programming   - Redirect inappropriate behaviors   3/6/2021 1523 by PATTIE Hollis  Outcome: Progressing  3/6/2021 1100 by PATTIE Hollis  Outcome: Progressing

## 2021-03-06 NOTE — ASSESSMENT & PLAN NOTE
· Status post hysterectomy 1 year ago due to cancer  · Not currently managed on any hormone replacement therapy    · Ongoing outpatient management with OBGYN

## 2021-03-06 NOTE — CONSULTS
[address] Internal Medicine  Consult- [address] [facility] 1980, 36 y o  female MRN: [id number]  Unit/Bed#: [facility] 254-01 Encounter: [id number]  Primary Care Provider: Phan Aldridge MD   Date and time admitted to hospital: 3/5/2021 10:53 PM    Inpatient consult for Medical Clearance for [facility] patient  Consult performed by: Mayuri Artis PA-C  Consult ordered by: Smitha Cohen PA-C        Medical clearance for psychiatric admission  Assessment & Plan  · CMP, CBC, hemoglobin A1c, lipid panel, RPR, TSH pending  · UDS pending  · COVID-19 negative  · EKG pending this admission  · Medically stable for continued inpatient psychiatric treatment    Surgical menopause  Assessment & Plan  · Status post hysterectomy 1 year ago due to cancer  · Not currently managed on any hormone replacement therapy  · Ongoing outpatient management with OBGYN    Hypertension, essential  Assessment & Plan  · May be elevated in the setting of anxiety  · Would consider starting on Norvasc 5 mg daily if pressures remain elevated despite treating underlying anxiety    Type 2 diabetes mellitus without complication, without long-term current use of insulin [facility])  Assessment & Plan  Lab Results   Component Value Date    HGBA1C 4 9 04/06/2020       Recent Labs     03/05/21  1050 03/05/21  1350 03/05/21  2301   POCGLU 92 127 111       Blood Sugar Average: Last 72 hrs:  (P) 111   · Daily blood glucose checks  · Continue home dose metformin    Iron deficiency anemia due to chronic blood loss  Assessment & Plan  · Continue iron supplement      VTE Prophylaxis: Reason for no pharmacologic prophylaxis low risk  / reason for no mechanical VTE prophylaxis ambulatory     Recommendations for Discharge:  · Discharge timeline per primary team  Routine follow up with PCP    Counseling / Coordination of Care Time: 30 minutes  Greater than 50% of total time spent on patient counseling and coordination of care  Collaboration of Care:  Were Recommendations Directly Discussed with Primary Treatment Team? 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  Patient denies smoking, drinking or drug use  Available admission lab work and vitals are acceptable  Patient feels a baseline physical health  Patient appears medically stable for inpatient psychiatric treatment at this time  Review of Systems:    Review of Systems    Past Medical and Surgical History:     Past Medical History:   Diagnosis Date    Anemia     Anxiety     Depression     Diabetes [facility] [address] )     Endometrial cancer ([facility] [address] ) 4/2/2020    Initial path: A  Uterus, Endometrium, Biopsy: - Consistent with atypical polypoid adenomyoma  See Note    B  Uterus, Cervical Mass, Excision: - Consistent with atypical polypoid adenomyoma  See Note    C  Uterine Cervical Mass, Excision: - Endometrioid adenocarcinoma, FIGO Grade I, 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   See Note    GERD (gastroesophageal reflux disease)     History of transfusion 08/2019-3/14/20    Syncope     Due to blood loss    Uterine cancer ([facility] [address] ) 2019       Past Surgical History:   Procedure Laterality Date    HYSTERECTOMY  2019    NO PAST SURGERIES      OOPHORECTOMY  2019    MA LAPAROSCOPY W TOT HYSTERECTUTERUS <=250 GRAM  W TUBE/OVARY N/A 4/16/2020    Procedure: ROBOTIC TOTAL LAPAROSCOPIC HYSTERECTOMY, BILATERAL SALPINGO-OOPHORECTOMY, SENTINEL LYMPH NODE BIOPSY;  Surgeon: Diamond Cotton MD;  Location: [facility];  Service: Gynecology Oncology       Meds/Allergies:    PTA meds:   Prior to Admission Medications Prescriptions Last Dose Informant Patient Reported? Taking?    Blood Glucose Monitoring Suppl (ACCU-CHEK GUIDE) w/Device KIT  Self Yes No   Sig: daily Use as directed   FEROSUL 325 (65 Fe) MG tablet  Self No No   Sig: Take 1 tablet (325 mg total) by mouth 2 (two) times a day with meals   Lancets (ACCU-CHEK SOFT TOUCH) lancets  Self No No   Sig: Use as instructed once daily   Psyllium (METAMUCIL FIBER PO)  Self Yes No   Sig: Take by mouth as needed   cholecalciferol (VITAMIN D3) 1,000 units tablet  Self Yes No   Sig: Take 1,000 Units by mouth daily   docusate sodium (COLACE) 100 mg capsule  Self Yes No   Sig: Take 100 mg by mouth 2 (two) times a day   glucose blood test strip  Self No No   Sig: use 1 TEST STRIP to TEST BLOOD SUGAR once daily   metFORMIN (GLUCOPHAGE) 500 mg tablet  Self No No   Sig: Take 1 tablet (500 mg total) by mouth 2 (two) times a day with meals   omeprazole (PriLOSEC) 20 mg delayed release capsule  Self Yes No   Sig: Take 20 mg by mouth daily   sertraline (ZOLOFT) 100 mg tablet  Self No No   Sig: take 2 tablets by mouth once daily   Patient taking differently: Take 200 mg by mouth daily in the early morning       Facility-Administered Medications: None       Allergies: No Known Allergies    Social History:     Marital Status: Single    Substance Use History:   Social History     Substance and Sexual Activity   Alcohol Use Yes    Frequency: Monthly or less    Drinks per session: 1 or 2    Comment: couple times per year-holidays     Social History     Tobacco Use   Smoking Status Never Smoker   Smokeless Tobacco Never Used     Social History     Substance and Sexual Activity   Drug Use No       Family History:    Family History   Problem Relation Age of Onset    Hypertension Mother     No Known Problems Father     Lung cancer Maternal Grandfather     No Known Problems Brother     Cancer Maternal Aunt     No Known Problems Maternal Aunt     No Known Problems Maternal Aunt     No Known Problems Paternal Aunt        Physical Exam:     Vitals:   Blood Pressure: 153/95 (03/06/21 0749)  Pulse: 91 (03/06/21 0749)  Temperature: 98 2 °F (36 8 °C) (03/06/21 0749)  Temp Source: Tympanic (03/06/21 0749)  Respirations: 18 (03/06/21 0749)  Height: 5' 7" (170 2 cm) (03/05/21 2307)  Weight - Scale: 108 kg (238 lb) (03/05/21 2307)  SpO2: 97 % (03/05/21 2307)    Physical Exam  Constitutional:       General: She is awake  She is not in acute distress  HENT:      Head: Normocephalic and atraumatic  Cardiovascular:      Rate and Rhythm: Normal rate and regular rhythm  Heart sounds: No murmur  Pulmonary:      Effort: Pulmonary effort is normal       Breath sounds: Normal breath sounds  Abdominal:      General: There is no distension  Palpations: Abdomen is soft  Musculoskeletal:      Right lower leg: No edema  Left lower leg: No edema  Skin:     General: Skin is warm and dry  Neurological:      Mental Status: She is alert  Comments: CN II-XII grossly intact   Psychiatric:         Mood and Affect: Mood is anxious  Additional Data:     Lab Results: I have personally reviewed pertinent reports  Lab Results   Component Value Date/Time    HGBA1C 4 9 04/06/2020 11:24 AM     Results from last 7 days   Lab Units 03/05/21  2301 03/05/21  1350 03/05/21  1050   POC GLUCOSE mg/dl 111 127 92           Imaging: I have personally reviewed pertinent reports  No orders to display       EKG, Pathology, and Other Studies Reviewed on Admission:   · EKG: None this admission    ** Please Note: This note has been constructed using a voice recognition system   **

## 2021-03-06 NOTE — ASSESSMENT & PLAN NOTE
· May be elevated in the setting of anxiety  · Would consider starting on Norvasc 5 mg daily if pressures remain elevated despite treating underlying anxiety

## 2021-03-06 NOTE — H&P
Psychiatric Evaluation - Behavioral Health     Identification Data:Frannie Linda 36 y o  female MRN: [id number]  Unit/Bed#: [facility] 254-01 Encounter: [id number]      Assessment/Plan   Principal Problem:    Severe episode of recurrent major depressive disorder, without psychotic features ([facility] [address] )  Active Problems:    Iron deficiency anemia due to chronic blood loss    Type 2 diabetes mellitus without complication, without long-term current use of insulin (HCC)    Hypertension, essential    Surgical menopause    Medical clearance for psychiatric admission    Generalized anxiety disorder      Plan: 1  Disposition: Patient meets criteria for acute inpatient treatment due to being a danger to self  2  Legal status: voluntary  3  Psychopharmacologic interventions:  A) for depression anxiety continue home med Zoloft 200 mg daily  Due to patient's concern about weight gain and being on diabetes will hold any SGA at this time  Consider Wellbutrin if depression worsens  B) for worsening anxiety- started BuSpar 5 mg 2 times daily  4 Medical comorbidities: Consult hospitalist for baseline admission assessment and follow up as needed  5  Reviewed admission labs  6 Other therapies: Individual/group/milieu therapy as appropriate   7  Social issues: Case management to arrange outpatient follow up  Collaborate with family for baseline assessment and disposition planning  8 Precautions: Routine q7min checks, vitals q4h  Risks, benefits and possible side effects of Medications:   Risks, benefits, and possible side effects of medications explained to patient and patient verbalizes understanding  Risks of medications in pregnancy explained if female patient  Patient verbalizes understanding and agrees to notify her doctor if she becomes pregnant  Chief Complaint:   "it has been difficult lately"      History of Present Illness       Frannie Linda is a 36 y o  female with a history of Major Depressive Disorder and Generalized Anxiety Disorder who was admitted to the inpatient psychiatric unit on a voluntary 201 commitment basis due to depression, anxiety and suicidal ideation with plan to jump off a bridge  Symptoms prior to admission included worsening depression, feeling suicidal, passive death wish and hopelessness  Onset of symptoms was gradual starting several weeks ago with progressively worsening course since that time  Stressors preceding admission included COVID-19 issues, occupational problems, health issues, everyday stressors and ongoing anxiety  On initial evaluation after admission to the inpatient psychiatric unit Frannie her depression has been worsening gradually for the past several weeks  Last week patient thought of jumping of the bridge and almost walked near the bridge during the morning walk  She has been depressed and struggling with anxiety since the age of 16 and has been in medications since age 32  Patient reported last years she "has had a lot-  Was diagnosed with uterine carcinoma for stage on 2021, had undergone hysterectomy and currently in remission, started to have significant issues with her menstrual cycle and required 2 blood transfusion for irregular breeding in a span of few months, 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  She reported that though she was coping it with well for the past month it has become more difficult  Today she rates her depression and anxiety 7/10 in severity  She reports that she sleeps adequate hours at night but it is her anxiety makes her depression worse  She feels that current Zoloft has been helpful with her depression but anxiety has not been appropriately managed  She was also diagnosed with DM type 2 last year and has been well controlled with diet modification and metformin 500 mg 2 times daily  Today at this time she reports her suicidal thoughts are better 6 to 7/10 in severity 10 being severe  However she does not have any active plan  She is able to contract for safety verbally at this time  She denies any perceptual disturbances  No delusions elicited at this time  She did not have any other complaint or concern  Medical Review Of Systems:  Constitutional: negative  Ears, nose, mouth, throat, and face: negative  Respiratory: negative  Cardiovascular: negative  Gastrointestinal: negative  Musculoskeletal:negative  Neurological: negative    Psychiatric Review Of Systems:  sleep: no  appetite changes: no  weight changes: yes  energy/anergy: no  interest/pleasure/anhedonia: yes  somatic symptoms: no  anxiety/panic: yes  anthony: no  guilty/hopeless: yes  self injurious behavior/risky behavior: yes    Past Psychiatric History:     History of depression since age 32 and has been in treatment,  although she struggled with depression from age 16  Past Suicide attempts: One by overdosing on medication and was admitted in [facility] for few weeks  Following discharge patient did not follow-up  This is patient's 2nd inpatient hospitalization  She does not have any outpatient psychiatric treatment at this time  Her primary care has been given a prescription for Zoloft 200 mg daily for several years  Past Violent behavior: denies  Past Psychiatric medication trial: Prozac prior to switching to Zoloft years ago  Substance Abuse History:      Denies any illicit substance use  I spent time with patient in counseling and education on risk of substance abuse  Assessed him motivation and encouraged patient for treatment  Brief intervention done       Social History     Tobacco History     Smoking Status  Never Smoker    Smokeless Tobacco Use  Never Used          Alcohol History     Alcohol Use Status  Yes Comment  couple times per year-holidays          Drug Use     Drug Use Status  No          Sexual Activity     Sexually Active  Never          Activities of Daily Living    Not Asked Additional Substance Use Detail     Questions Responses    Problems Due to Past Use of Alcohol? No    Problems Due to Past Use of Substances? No    Substance Use Assessment Denies substance use within the past 12 months    Alcohol Use Frequency Experimented    Cannabis frequency Never used    Comment: Never used on 3/6/2021     Heroin Frequency Denies use in past 12 months    Cocaine frequency Never used    Comment: Never used on 3/6/2021     Crack Cocaine Frequency Denies use in past 12 months    Methamphetamine Frequency Denies use in past 12 months    Narcotic Frequency Denies use in past 12 months    Benzodiazepine Frequency Denies use in past 12 months    Amphetamine frequency Denies use in past 12 months    Barbituate Frequency Denies use use in past 12 months    Inhalant frequency Never used    Comment: Never used on 3/6/2021     Hallucinogen frequency Never used    Comment: Never used on 3/6/2021     Ecstasy frequency Never used    Comment: Never used on 3/6/2021     Other drug frequency Never used    Comment: Never used on 3/6/2021     Opiate frequency Denies use in past 12 months    Last reviewed by Tay Luke MD on 3/6/2021        I have assessed this patient for substance use within the past 12 months      Family Psychiatric History:   Psychiatric Illness Father Illness: bipolar d/o, depression  Social History:  Education: some college  Learning Disabilities: none  Marital history: single  Living arrangement, social support: The patient lives in home with mother and brother     Occupational History: employed same job for 2 6 years  Functioning Relationships: good support system, gets along well with co-workers and good relationship with parents    Other Pertinent History: None      Traumatic History:   Abuse: none  Other Traumatic Events: none    Past Medical History:   Diagnosis Date    Anemia     Anxiety     Depression     Diabetes [facility] [address] )     Endometrial cancer ([facility] ) 4/2/2020    Initial path: A  Uterus, Endometrium, Biopsy: - Consistent with atypical polypoid adenomyoma  See Note    B  Uterus, Cervical Mass, Excision: - Consistent with atypical polypoid adenomyoma  See Note    C  Uterine Cervical Mass, Excision: - Endometrioid adenocarcinoma, FIGO Grade I, 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   See Note    Generalized anxiety disorder 3/6/2021    GERD (gastroesophageal reflux disease)     History of transfusion 08/2019-3/14/20    Severe episode of recurrent major depressive disorder, without psychotic features ([facility] ) 3/6/2021    Syncope     Due to blood loss    Uterine cancer ([facility] ) 2019           Meds/Allergies   all current active meds have been reviewed  No Known Allergies    Objective   Vital signs in last 24 hours:  Temp:  [98 2 °F (36 8 °C)-98 7 °F (37 1 °C)] 98 2 °F (36 8 °C)  HR:  [79-91] 91  Resp:  [17-18] 18  BP: (142-153)/(75-95) 153/95    No intake or output data in the 24 hours ending 03/06/21 1047    Mental Status Evaluation:  Appearance:  casually dressed and older than stated age   Behavior:  normal   Speech:  normal pitch and normal volume   Mood:  anxious and depressed   Affect:  mood-congruent   Language: naming objects   Thought Process:  goal directed and logical   Thought Content:  normal   Perceptual Disturbances: None   Risk Potential: Suicidal Ideations with plan to jump off bridge last week, currently denies, Homicidal Ideations none and Potential for Aggression No   Sensorium:  person, place and time/date   Cognition:  recent and remote memory grossly intact   Consciousness:  alert and awake    Attention: attention span and concentration were age appropriate   Intellect: within normal limits   Fund of Knowledge: awareness of current events: covid 19   Insight:  fair   Judgment: fair   Muscle Strength and Tone: face, neck and torso   Gait/Station: normal gait/station   Motor Activity: no abnormal movements     Memory: Short and long term memory -intact Laboratory results:  I have personally reviewed all pertinent laboratory/tests results  Imaging Studies: No results found  Code Status: Level 1 - Full Code  Advance Directive and Living Will: <no information>        Risks / Benefits of Treatment:     Risks, benefits, and possible side effects of medications explained to patient  The patient verbalizes understanding and agreement for treatment  Counseling / Coordination of Care:     Patient's presentation on admission and proposed treatment plan discussed with treatment team   Diagnosis, medication changes and treatment plan reviewed with patient  Recent stressors discussed with patient     Events leading to admission reviewed with patient  Importance of medication and treatment compliance reviewed with patient   -------Discussed with patient plan for alcohol detoxification protocol and gradual taper of medications to prevent withdrawal symptoms------  Inpatient Psychiatric Certification:     Certification: Estimated length of stay: More than 2 midnights  I certify that inpatient services are medically necessary for this patient for a duration of greater that 2 midnights for the treatment of Severe episode of recurrent major depressive disorder, without psychotic features ([facility] [address] )   See H&P and MD Progress Notes for additional information about the patient's course of treatment  The patient has been released from the Emergency Department and medically cleared as per Emergency Department documentation for psychiatric admission for Severe episode of recurrent major depressive disorder, without psychotic features [facility])      This note has been constructed using a voice recognition system  There may be translation, syntax,  or grammatical errors  If you have any questions, please contact the dictating provider      Elizabeth Caldera MD   03/06/21

## 2021-03-07 LAB
AMPHETAMINES SERPL QL SCN: NEGATIVE
BARBITURATES UR QL: NEGATIVE
BENZODIAZ UR QL: NEGATIVE
COCAINE UR QL: NEGATIVE
METHADONE UR QL: NEGATIVE
OPIATES UR QL SCN: NEGATIVE
OXYCODONE+OXYMORPHONE UR QL SCN: NEGATIVE
PCP UR QL: NEGATIVE
THC UR QL: NEGATIVE

## 2021-03-07 PROCEDURE — 99232 SBSQ HOSP IP/OBS MODERATE 35: CPT | Performed by: PSYCHIATRY & NEUROLOGY

## 2021-03-07 PROCEDURE — 80307 DRUG TEST PRSMV CHEM ANLYZR: CPT | Performed by: PSYCHIATRY & NEUROLOGY

## 2021-03-07 RX ADMIN — PANTOPRAZOLE SODIUM 20 MG: 20 TABLET, DELAYED RELEASE ORAL at 06:25

## 2021-03-07 RX ADMIN — METFORMIN HYDROCHLORIDE 500 MG: 500 TABLET ORAL at 08:03

## 2021-03-07 RX ADMIN — BUSPIRONE HYDROCHLORIDE 5 MG: 5 TABLET ORAL at 17:02

## 2021-03-07 RX ADMIN — SERTRALINE HYDROCHLORIDE 200 MG: 100 TABLET ORAL at 08:03

## 2021-03-07 RX ADMIN — METFORMIN HYDROCHLORIDE 500 MG: 500 TABLET ORAL at 17:01

## 2021-03-07 RX ADMIN — BUSPIRONE HYDROCHLORIDE 5 MG: 5 TABLET ORAL at 08:03

## 2021-03-07 RX ADMIN — Medication 1000 UNITS: at 08:03

## 2021-03-07 NOTE — TREATMENT TEAM
03/07/21 0700   Team Meeting   Meeting Type Daily Rounds   Team Members Present   Team Members Present Physician;Nurse   Physician Team Member Dr Elmira Cole   Nursing Team Member 31 Sara Gomes   Patient/Family Present   Patient Present No   Patient's Family Present No   Daily Rounds: Pt visible and social on unit  Denies SI/HI/AVH  No PRNs, slept well

## 2021-03-07 NOTE — PROGRESS NOTES
Progress Note - Behavioral Health   Frannie Jj 36 y o  female MRN: [id number]  Unit/Bed#: U 254-01 Encounter: @[facility]        Assessment/Plan   Principal Problem:    Severe episode of recurrent major depressive disorder, without psychotic features ([address] )  Active Problems:    Iron deficiency anemia due to chronic blood loss    Type 2 diabetes mellitus without complication, without long-term current use of insulin (HCC)    Hypertension, essential    Surgical menopause    Medical clearance for psychiatric admission    Generalized anxiety disorder      Subjective: The patient was seen today for continuing care and reviewed with treatment team     Frannie  reports that she is less depressed today but remains anxious  She is concerned about anxiety note her depression as much  Denies having any SI at this time  Though she slept through the night kept waking up in between because her roommate was snoring  Hopeful that medication will help with her anxiety  Patient has been compliant with medication and tolerating well  Denies any /  [facility] is able to contract  for safety, verbally at this time  No management issues reported by staff overnight      Current Medications:  Current Facility-Administered Medications   Medication Dose Route Frequency Provider Last Rate    acetaminophen  650 mg Oral Q6H PRN Belen Padilla PA-C      aluminum-magnesium hydroxide-simethicone  30 mL Oral Q4H PRN Belen Padilla PA-C      artificial tear  1 application Both Eyes D3R PRN Belen Padilla PA-C      haloperidol lactate  2 5 mg Intramuscular Q4H PRN Max 4/day Belen Padilla PA-C      And    LORazepam  1 mg Intramuscular Q4H PRN Max 4/day Belen Padilla PA-C      And    benztropine  0 5 mg Intramuscular Q4H PRN Max 4/day Belen Padilla PA-C      haloperidol lactate  5 mg Intramuscular Q4H PRN Max 4/day Belen Padilla PA-C      And    LORazepam  2 mg Intramuscular Q4H PRN Max 4/day Delman Montezuma Julian Diego, PA-C      And    benztropine  1 mg Intramuscular Q4H PRN Max 4/day Ghada Eye, PA-C      benztropine  1 mg Intramuscular Q4H PRN Max 6/day Ghada Eye, PA-C      benztropine  1 mg Oral Q4H PRN Max 6/day Ghada Eye, PA-C      bisacodyl  10 mg Rectal Daily PRN Ghada Eye, PA-C      busPIRone  5 mg Oral BID Cynthia Alberts MD      cholecalciferol  1,000 Units Oral Daily Cynthia Alberts MD      haloperidol  1 mg Oral Q6H PRN Ghada Eye, PA-C      haloperidol  2 5 mg Oral Q4H PRN Max 4/day Ghada Eye, PA-C      haloperidol  5 mg Oral Q4H PRN Max 4/day Ghada Eye, PA-C      hydrOXYzine HCL  25 mg Oral Q6H PRN Max 4/day Ghada Eye, PA-C      hydrOXYzine HCL  50 mg Oral Q4H PRN Max 4/day Ghada Eye, PA-C      Or    LORazepam  1 mg Intramuscular Q4H PRN Ghada Eye, PA-C      ibuprofen  600 mg Oral Q8H PRN Ghada Eye, PA-C      LORazepam  1 mg Oral Q4H PRN Max 6/day Ghada Eye, PA-C      Or    LORazepam  2 mg Intramuscular Q6H PRN Max 3/day Ghada Eye, PA-C      metFORMIN  500 mg Oral BID With Meals Cynthia Alberts MD      pantoprazole  20 mg Oral Early Morning Cynthia Alberts MD      polyethylene glycol  17 g Oral Daily PRN Ghada Eye, PA-C      senna-docusate sodium  1 tablet Oral Daily PRN Ghada Eye, PA-C      sertraline  200 mg Oral Daily Cynthia Alberts MD      traZODone  50 mg Oral HS PRN Ghada Eye, PA-C         Behavioral Health Medications: all current active meds have been reviewed and continue current psychiatric medications  Vital signs in last 24 hours:  Temp:  [97 8 °F (36 6 °C)-99 °F (37 2 °C)] 97 8 °F (36 6 °C)  HR:  [62-78] 62  Resp:  [16-18] 16  BP: (124-140)/(62-88) 124/62    Laboratory results:  I have personally reviewed all pertinent laboratory/tests results      Psychiatric Review of Systems:  Behavior over the last 24 hours:  improved  Sleep: normal  Appetite: normal  Medication side effects: No  ROS: no complaints and All other negative  Mental Status Evaluation:  Appearance:  age appropriate, casually dressed and overweight   Behavior:  normal    Speech:  normal pitch and normal volume   Mood:  More anxious than depressed   Affect:  mood-congruent   Thought Process:  goal directed and logical   Thought Content:  no delusions elicited   Perceptual Disturbances: None   Risk Potential: Suicidal Ideations none, Homicidal Ideations none and Potential for Aggression No   Passive death wishes improving  Sensorium:  person, place, time/date and situation   Consciousness:  alert    Insight:  age appropriate    Judgment: fair    Gait/Station: normal gait/station   Motor Activity: no abnormal movements       Progress Toward Goals: progressing    Recommended Treatment: 1  Continue with group therapy, milieu therapy and occupational therapy     2 Continue following current medications:   Current Facility-Administered Medications   Medication Dose Route Frequency Provider Last Rate    acetaminophen  650 mg Oral Q6H PRN Makayla Bannister, PA-C      aluminum-magnesium hydroxide-simethicone  30 mL Oral Q4H PRN Makayla Bannister, PA-C      artificial tear  1 application Both Eyes L5D PRN Makayla Banteshater, PA-C      haloperidol lactate  2 5 mg Intramuscular Q4H PRN Max 4/day Makayla Bannister, PA-C      And    LORazepam  1 mg Intramuscular Q4H PRN Max 4/day Makayla Bannister, PA-C      And    benztropine  0 5 mg Intramuscular Q4H PRN Max 4/day Makayla Bannister, PA-C      haloperidol lactate  5 mg Intramuscular Q4H PRN Max 4/day Makayla Bannister, PA-C      And    LORazepam  2 mg Intramuscular Q4H PRN Max 4/day Makayla Bannister, PA-C      And    benztropine  1 mg Intramuscular Q4H PRN Max 4/day Makayla Bannister, PA-C      benztropine  1 mg Intramuscular Q4H PRN Max 6/day Makayla Bannister, PA-C      benztropine  1 mg Oral Q4H PRN Max 6/day Makayla Bannister, PA-C      bisacodyl  10 mg Rectal Daily PRN Marleta Los, PA-C      busPIRone  5 mg Oral BID Dariel Smith MD      cholecalciferol  1,000 Units Oral Daily Dariel Smith MD      haloperidol  1 mg Oral Q6H PRN Marleta Los, PA-C      haloperidol  2 5 mg Oral Q4H PRN Max 4/day Marleta Los, PA-C      haloperidol  5 mg Oral Q4H PRN Max 4/day Marleta Los, PA-C      hydrOXYzine HCL  25 mg Oral Q6H PRN Max 4/day Marleta Los, PA-C      hydrOXYzine HCL  50 mg Oral Q4H PRN Max 4/day Marleta Los, PA-C      Or    LORazepam  1 mg Intramuscular Q4H PRN Marleta Los, PA-C      ibuprofen  600 mg Oral Q8H PRN Marleta Los, PA-C      LORazepam  1 mg Oral Q4H PRN Max 6/day Marleta Los, PA-C      Or    LORazepam  2 mg Intramuscular Q6H PRN Max 3/day Marleta Los, PA-C      metFORMIN  500 mg Oral BID With Meals Dariel Smith MD      pantoprazole  20 mg Oral Early Morning Dariel Smith MD      polyethylene glycol  17 g Oral Daily PRN Marleta Los, PA-C      senna-docusate sodium  1 tablet Oral Daily PRN Marleta Los, PA-C      sertraline  200 mg Oral Daily Dariel Smith MD      traZODone  50 mg Oral HS PRN Marleta Los, PA-C         Risks, benefits and possible side effects of Medications:   Risks, benefits, and possible side effects of medications explained to patient and patient verbalizes understanding  Risks of medications in pregnancy explained if female patient  Patient verbalizes understanding and agrees to notify her doctor if she becomes pregnant  This note has been constructed using a voice recognition system  Occasional wrong word or "sound a like" substitutions may have occurred due to the inherent limitations of voice recognition software  There may be translation, syntax,  or grammatical errors  If you have any questions, please contact the dictating provider      Dariel Smith MD  03/07/21

## 2021-03-07 NOTE — PROGRESS NOTES
Pt calm and cooperative, pleasant during interaction  Visible on unit and attending groups  Reports anxiety however appears comfortable on unit and is social with peers  Denies SI/HI/AVH

## 2021-03-07 NOTE — NURSING NOTE
Pt cooperative and pleasant on unit  Pt social and visible on unit  Pt denies SI/HI/AVH  Pt reported sleeping well  Pt denies any questions or concerns

## 2021-03-08 LAB — RPR SER QL: NORMAL

## 2021-03-08 PROCEDURE — 99232 SBSQ HOSP IP/OBS MODERATE 35: CPT | Performed by: STUDENT IN AN ORGANIZED HEALTH CARE EDUCATION/TRAINING PROGRAM

## 2021-03-08 RX ORDER — BUSPIRONE HYDROCHLORIDE 10 MG/1
10 TABLET ORAL 2 TIMES DAILY
Status: DISCONTINUED | OUTPATIENT
Start: 2021-03-08 | End: 2021-03-12 | Stop reason: HOSPADM

## 2021-03-08 RX ADMIN — BUSPIRONE HYDROCHLORIDE 5 MG: 5 TABLET ORAL at 08:20

## 2021-03-08 RX ADMIN — BUSPIRONE HYDROCHLORIDE 10 MG: 10 TABLET ORAL at 17:26

## 2021-03-08 RX ADMIN — METFORMIN HYDROCHLORIDE 500 MG: 500 TABLET ORAL at 17:25

## 2021-03-08 RX ADMIN — PANTOPRAZOLE SODIUM 20 MG: 20 TABLET, DELAYED RELEASE ORAL at 06:34

## 2021-03-08 RX ADMIN — METFORMIN HYDROCHLORIDE 500 MG: 500 TABLET ORAL at 08:20

## 2021-03-08 RX ADMIN — SERTRALINE HYDROCHLORIDE 200 MG: 100 TABLET ORAL at 08:20

## 2021-03-08 RX ADMIN — Medication 1000 UNITS: at 08:20

## 2021-03-08 NOTE — CASE MANAGEMENT
CM met with pt to complete intake and sign RITESH for dc planning  Pt was admitted on: Friday 3/5/2021 from [facility] ED     Reasons for admission/stressors:  ED Note 3/5/2021: "41-year-old female presents today reporting anxiety to the point where she states I Want to kill myself  She said symptoms started 4 days ago had a been getting progressively worse  Called her PCP this morning it is no one is in the office today so she came to the emergency department  She denies homicidal ideation, denies auditory or visual hallucinations  Denies drug abuse "    Pt wishes to return to/with: Mom and younger brother     Outpatient Services: Pt is not currently set up with Outpatient Services but open to referral     Current medications: Buspar, Zoloft     D&A Resources/Rehab?: N/A    UDS: N/A  BAT: N/A    PCP: Mariah Medina MD   ICM: N/A    Prior IP Treatment: N/A    Medical Concerns: Type 2 Diabetes, Past hx of cancer     Insurance: [facility]     Legal Issues: Denies    Access to firearms: Denies    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

## 2021-03-08 NOTE — PROGRESS NOTES
Severe episode of recurrent major depressive disorder, without psychotic features reviewed  Short term goals for decrease in depressive symptoms, decrease in anxiety symptoms, decrease in suicidal thoughts discussed  At this time, dc date has not been determined  All parties in agreement and treatment plan signed        03/08/21 1515   Team Meeting   Meeting Type Tx Team Meeting   Team Members Present   Team Members Present Physician;Nurse;   Physician Team Member Dr Yesenia Farrell Team Member [facility] Management Team Member Kellen   Patient/Family Present   Patient Present Yes   Patient's Family Present No

## 2021-03-08 NOTE — NURSING NOTE
Pt calm and cooperative during interaction  Pt reported anxiety and depression are manageable  Pt social and visible on the unit  Pt denies any SI/HI/AVH and questions or concerns

## 2021-03-08 NOTE — PROGRESS NOTES
03/08/21 1000 03/08/21 1157 03/08/21 1315   Activity/Group Checklist   Group Community meeting  (goal setting - self-care) Nursing Education  (safety plan) Life Skills  (letting go of worry)   Attendance Attended Attended Attended   Attendance Duration (min) 31-45 16-30 31-45   Interactions Interacted appropriately  (engaged frequently and spontaneously in interactions) Interacted appropriately Interacted appropriately   Affect/Mood Appropriate Appropriate Appropriate   Goals Achieved Identified feelings; Able to listen to others; Able to engage in interactions; Able to reflect/comment on own behavior  --  Able to listen to others; Able to engage in interactions; Able to reflect/comment on own behavior;Discussed coping strategies; Identified feelings      03/08/21 1400   Activity/Group Checklist   Group Other (Comment)  (journaling)   Attendance Attended   Attendance Duration (min) 31-45   Interactions Interacted appropriately   Affect/Mood Appropriate   Goals Achieved Able to engage in interactions   Pt attended 4/4 therapeutic groups today  She presented as pleasant and cooperative during interactions  She engaged frequently and spontaneously in group discussions and offered insightful comments

## 2021-03-08 NOTE — PROGRESS NOTES
Progress Note - Behavioral Health   Frannie Jj 36 y o  female MRN: [id number]  Unit/Bed#: [facility] 254-01 Encounter: [id number]    Assessment/Plan   Principal Problem:    Severe episode of recurrent major depressive disorder, without psychotic features ([facility] [address] )  Active Problems:    Iron deficiency anemia due to chronic blood loss    Type 2 diabetes mellitus without complication, without long-term current use of insulin ([facility])    Hypertension, essential    Surgical menopause    Medical clearance for psychiatric admission    Generalized anxiety disorder      Subjective: Patient was seen, chart reviewed and case discussed with team  This is 37 yo female with hx of depression/anxiety and medical comorbidities admitted to inpatient unit on voluntary status for worsening depression, anxiety and suicidal ideations  Patient endorses depressed mood, anhedonia, lack of motivation, poor energy, anxiety and fleeting suicidal ideations  Denies SI/HI/AH/VH  She is compliant with medications  Denies any side effects       Psychiatric Review of Systems:  Behavior over the last 24 hours:  improved  Sleep: normal  Appetite: normal  Medication side effects: No  ROS: no complaints, all others negative    Current Medications:  Current Facility-Administered Medications   Medication Dose Route Frequency    acetaminophen (TYLENOL) tablet 650 mg  650 mg Oral Q6H PRN    aluminum-magnesium hydroxide-simethicone (MYLANTA) oral suspension 30 mL  30 mL Oral Q4H PRN    artificial tear (LUBRIFRESH P M ) ophthalmic ointment 1 application  1 application Both Eyes I6S PRN    haloperidol lactate (HALDOL) injection 2 5 mg  2 5 mg Intramuscular Q4H PRN Max 4/day    And    LORazepam (ATIVAN) injection 1 mg  1 mg Intramuscular Q4H PRN Max 4/day    And    benztropine (COGENTIN) injection 0 5 mg  0 5 mg Intramuscular Q4H PRN Max 4/day    haloperidol lactate (HALDOL) injection 5 mg  5 mg Intramuscular Q4H PRN Max 4/day    And    LORazepam (ATIVAN) injection 2 mg  2 mg Intramuscular Q4H PRN Max 4/day    And    benztropine (COGENTIN) injection 1 mg  1 mg Intramuscular Q4H PRN Max 4/day    benztropine (COGENTIN) injection 1 mg  1 mg Intramuscular Q4H PRN Max 6/day    benztropine (COGENTIN) tablet 1 mg  1 mg Oral Q4H PRN Max 6/day    bisacodyl (DULCOLAX) rectal suppository 10 mg  10 mg Rectal Daily PRN    busPIRone (BUSPAR) tablet 10 mg  10 mg Oral BID    cholecalciferol (VITAMIN D3) tablet 1,000 Units  1,000 Units Oral Daily    haloperidol (HALDOL) tablet 1 mg  1 mg Oral Q6H PRN    haloperidol (HALDOL) tablet 2 5 mg  2 5 mg Oral Q4H PRN Max 4/day    haloperidol (HALDOL) tablet 5 mg  5 mg Oral Q4H PRN Max 4/day    hydrOXYzine HCL (ATARAX) tablet 25 mg  25 mg Oral Q6H PRN Max 4/day    hydrOXYzine HCL (ATARAX) tablet 50 mg  50 mg Oral Q4H PRN Max 4/day    Or    LORazepam (ATIVAN) injection 1 mg  1 mg Intramuscular Q4H PRN    ibuprofen (MOTRIN) tablet 600 mg  600 mg Oral Q8H PRN    LORazepam (ATIVAN) tablet 1 mg  1 mg Oral Q4H PRN Max 6/day    Or    LORazepam (ATIVAN) injection 2 mg  2 mg Intramuscular Q6H PRN Max 3/day    metFORMIN (GLUCOPHAGE) tablet 500 mg  500 mg Oral BID With Meals    pantoprazole (PROTONIX) EC tablet 20 mg  20 mg Oral Early Morning    polyethylene glycol (MIRALAX) packet 17 g  17 g Oral Daily PRN    senna-docusate sodium (SENOKOT S) 8 6-50 mg per tablet 1 tablet  1 tablet Oral Daily PRN    sertraline (ZOLOFT) tablet 200 mg  200 mg Oral Daily    traZODone (DESYREL) tablet 50 mg  50 mg Oral HS PRN       Behavioral Health Medications: all current active meds have been reviewed and continue current psychiatric medications  Vitals:  Vitals:    03/08/21 0720   BP: 134/79   Pulse: 77   Resp: 16   Temp: (!) 97 4 °F (36 3 °C)   SpO2:        Laboratory results:  I have personally reviewed all pertinent laboratory/tests results      Mental Status Evaluation:  Appearance:  age appropriate and casually dressed   Behavior:  normal Speech:  normal pitch and normal volume   Mood:  anxious and depressed   Affect:  constricted and labile   Language Appropriate   Thought Process:  goal directed and logical   Thought Content:  normal   Perceptual Disturbances: None   Risk Potential: Suicidal Ideations none, Homicidal Ideations none and Potential for Aggression No   Sensorium:  person, place, time/date, situation, day of week, month of year and year   Cognition:  recent and remote memory grossly intact   Consciousness:  alert    Attention: attention span appeared shorter than expected for age   Insight:  fair   Judgment: fair   Gait/Station: normal gait/station   Motor Activity: no abnormal movements     Progress Toward Goals: Progressing    Recommended Treatment: Continue with pharmacotherapy, group therapy, milieu therapy and occupational therapy      1  Increase Buspar to 10 mg BID

## 2021-03-08 NOTE — PROGRESS NOTES
Pt remains pleasant and social with peers on the unit  Denies SI/HI-verbally contracts for safety  Pt reports anxiety continues to fluctuating throughout the day however does feel like she is having a good day overall  Mild improvement with depression since admission  Pt has been attending groups and compliant with scheduled medications

## 2021-03-09 PROCEDURE — 99232 SBSQ HOSP IP/OBS MODERATE 35: CPT | Performed by: STUDENT IN AN ORGANIZED HEALTH CARE EDUCATION/TRAINING PROGRAM

## 2021-03-09 RX ORDER — ARIPIPRAZOLE 2 MG/1
2 TABLET ORAL DAILY
Status: DISCONTINUED | OUTPATIENT
Start: 2021-03-09 | End: 2021-03-12 | Stop reason: HOSPADM

## 2021-03-09 RX ORDER — LOPERAMIDE HYDROCHLORIDE 2 MG/1
2 CAPSULE ORAL EVERY 4 HOURS PRN
Status: DISCONTINUED | OUTPATIENT
Start: 2021-03-09 | End: 2021-03-12 | Stop reason: HOSPADM

## 2021-03-09 RX ADMIN — ARIPIPRAZOLE 2 MG: 2 TABLET ORAL at 14:31

## 2021-03-09 RX ADMIN — Medication 1000 UNITS: at 08:33

## 2021-03-09 RX ADMIN — BUSPIRONE HYDROCHLORIDE 10 MG: 10 TABLET ORAL at 08:33

## 2021-03-09 RX ADMIN — PANTOPRAZOLE SODIUM 20 MG: 20 TABLET, DELAYED RELEASE ORAL at 06:23

## 2021-03-09 RX ADMIN — BUSPIRONE HYDROCHLORIDE 10 MG: 10 TABLET ORAL at 17:25

## 2021-03-09 RX ADMIN — SERTRALINE HYDROCHLORIDE 200 MG: 100 TABLET ORAL at 08:33

## 2021-03-09 RX ADMIN — METFORMIN HYDROCHLORIDE 500 MG: 500 TABLET ORAL at 08:30

## 2021-03-09 RX ADMIN — METFORMIN HYDROCHLORIDE 500 MG: 500 TABLET ORAL at 17:25

## 2021-03-09 NOTE — PROGRESS NOTES
Status: New admission  201  SI to "jump off something high" Reported high anxiety  Pt has type 2 diabetes   Pt social on the unit, past suicide attempt in 2013 (took handful of pills) was then admitted to [facility]      Medication: No changes / PRN: None     D/C: TBD- dc date not yet determined      03/08/21 0757   Team Meeting   Meeting Type Daily Rounds   Team Members Present   Team Members Present Physician;Nurse;;Occupational Therapist   Physician Team Member Dr Kelly Valderrama / Anthony Sullivan Team Member Maddi Wilkerson / May Benton Management Team Member Justina Boggs / Marry Maxwell   OT Team Member Sandi   Patient/Family Present   Patient Present No   Patient's Family Present No

## 2021-03-09 NOTE — PROGRESS NOTES
Progress Note - Behavioral Health   Frannie Jj 36 y o  female MRN: [id number]  Unit/Bed#: [facility] 254-01 Encounter: [id number]    Assessment/Plan   Principal Problem:    Severe episode of recurrent major depressive disorder, without psychotic features ([address] )  Active Problems:    Iron deficiency anemia due to chronic blood loss    Type 2 diabetes mellitus without complication, without long-term current use of insulin ([facility])    Hypertension, essential    Surgical menopause    Medical clearance for psychiatric admission    Generalized anxiety disorder      Subjective: Patient was seen, chart reviewed and case discussed with team  As per report patient endorsed SI yesterday evening  Patient reports "extremely anxious" yesterday after a phone call  She also reports mood swings and irritability  She agreed to try SGA, benefits and side effects were discussed  She also reports diarrhea from Metformin, since she stopped taking Iron pills  Denies SI/HI/AH/VH     Psychiatric Review of Systems:  Behavior over the last 24 hours:  regressed  Sleep: normal  Appetite: normal  Medication side effects: No  ROS: diarrhea, all others negative    Current Medications:  Current Facility-Administered Medications   Medication Dose Route Frequency    acetaminophen (TYLENOL) tablet 650 mg  650 mg Oral Q6H PRN    aluminum-magnesium hydroxide-simethicone (MYLANTA) oral suspension 30 mL  30 mL Oral Q4H PRN    artificial tear (LUBRIFRESH P M ) ophthalmic ointment 1 application  1 application Both Eyes H0Q PRN    haloperidol lactate (HALDOL) injection 2 5 mg  2 5 mg Intramuscular Q4H PRN Max 4/day    And    LORazepam (ATIVAN) injection 1 mg  1 mg Intramuscular Q4H PRN Max 4/day    And    benztropine (COGENTIN) injection 0 5 mg  0 5 mg Intramuscular Q4H PRN Max 4/day    haloperidol lactate (HALDOL) injection 5 mg  5 mg Intramuscular Q4H PRN Max 4/day    And    LORazepam (ATIVAN) injection 2 mg  2 mg Intramuscular Q4H PRN Max 4/day    And  benztropine (COGENTIN) injection 1 mg  1 mg Intramuscular Q4H PRN Max 4/day    benztropine (COGENTIN) injection 1 mg  1 mg Intramuscular Q4H PRN Max 6/day    benztropine (COGENTIN) tablet 1 mg  1 mg Oral Q4H PRN Max 6/day    bisacodyl (DULCOLAX) rectal suppository 10 mg  10 mg Rectal Daily PRN    busPIRone (BUSPAR) tablet 10 mg  10 mg Oral BID    cholecalciferol (VITAMIN D3) tablet 1,000 Units  1,000 Units Oral Daily    haloperidol (HALDOL) tablet 1 mg  1 mg Oral Q6H PRN    haloperidol (HALDOL) tablet 2 5 mg  2 5 mg Oral Q4H PRN Max 4/day    haloperidol (HALDOL) tablet 5 mg  5 mg Oral Q4H PRN Max 4/day    hydrOXYzine HCL (ATARAX) tablet 25 mg  25 mg Oral Q6H PRN Max 4/day    hydrOXYzine HCL (ATARAX) tablet 50 mg  50 mg Oral Q4H PRN Max 4/day    Or    LORazepam (ATIVAN) injection 1 mg  1 mg Intramuscular Q4H PRN    ibuprofen (MOTRIN) tablet 600 mg  600 mg Oral Q8H PRN    loperamide (IMODIUM) capsule 2 mg  2 mg Oral Q4H PRN    LORazepam (ATIVAN) tablet 1 mg  1 mg Oral Q4H PRN Max 6/day    Or    LORazepam (ATIVAN) injection 2 mg  2 mg Intramuscular Q6H PRN Max 3/day    metFORMIN (GLUCOPHAGE) tablet 500 mg  500 mg Oral BID With Meals    pantoprazole (PROTONIX) EC tablet 20 mg  20 mg Oral Early Morning    polyethylene glycol (MIRALAX) packet 17 g  17 g Oral Daily PRN    senna-docusate sodium (SENOKOT S) 8 6-50 mg per tablet 1 tablet  1 tablet Oral Daily PRN    sertraline (ZOLOFT) tablet 200 mg  200 mg Oral Daily    traZODone (DESYREL) tablet 50 mg  50 mg Oral HS PRN       Behavioral Health Medications: all current active meds have been reviewed  Vitals:  Vitals:    03/09/21 0940   BP: 137/88   Pulse:    Resp:    Temp:    SpO2:        Laboratory results:  I have personally reviewed all pertinent laboratory/tests results      Mental Status Evaluation:  Appearance:  age appropriate   Behavior:  restless and fidgety   Speech:  normal pitch and normal volume   Mood:  anxious   Affect:  labile and mood-congruent   Language Appropriate   Thought Process:  goal directed, logical and perserverative   Thought Content:  normal   Perceptual Disturbances: None   Risk Potential: Suicidal Ideations none, Homicidal Ideations none and Potential for Aggression No   Sensorium:  person, place, time/date, situation, day of week, month of year and year   Cognition:  recent and remote memory grossly intact   Consciousness:  alert    Attention: attention span appeared shorter than expected for age   Insight:  fair   Judgment: fair   Gait/Station: normal gait/station   Motor Activity: no abnormal movements     Progress Toward Goals: Progressing    Recommended Treatment: Continue with pharmacotherapy, group therapy, milieu therapy and occupational therapy  1  Start abilify 2 mg PO Daily  2  Imodium PRN  3  Medicine consult for diarrhea and elevated BP  Risks, benefits and possible side effects of Medications:   Risks, benefits, and possible side effects of medications explained to patient and patient verbalizes understanding

## 2021-03-09 NOTE — PROGRESS NOTES
Pt pleasant during interaction  Social with peers, visible and attending groups  Appears comfortable  Denies SI/HI/AVH

## 2021-03-09 NOTE — PROGRESS NOTES
Pt was napping earlier when RN checked on her  Pt said she got stressed out calling about her medicaid  It was frustrating for her and she had mild SI  We discussed coping skills and she requested some help on this tomorrow  Empathized with pt and encouraged her to call again tomorrow  She said that she would  She said that her medications are working well and making her less anxious  She was pleasant and cooperative  Denied SI, HI and AVH

## 2021-03-09 NOTE — PROGRESS NOTES
Status: Pt loud, social, friendly, seen laughing on the unit  Pt upset that she had to make phone calls herself  BP was high in evening and morning       Medication: Buspar increased to 10mg / PRN: None      D/C: TBD - Dc date not yet determined      03/09/21 6815   Team Meeting   Meeting Type Daily Rounds   Team Members Present   Team Members Present Physician;Nurse;;Occupational Therapist   Physician Team Member Dr Berlin Dawn / Mariano General Team Member Merissa Cheney / Cheryl Raza Management Team Member Jose Miguel Schuster / Robby Arnold   OT Team Member Sandi   Patient/Family Present   Patient Present No   Patient's Family Present No

## 2021-03-09 NOTE — PLAN OF CARE
Problem: Nutrition/Hydration-ADULT  Goal: Nutrient/Hydration intake appropriate for improving, restoring or maintaining nutritional needs  Description: Monitor and assess patient's nutrition/hydration status for malnutrition  Collaborate with interdisciplinary team and initiate plan and interventions as ordered  Monitor patient's weight and dietary intake as ordered or per policy  Utilize nutrition screening tool and intervene as necessary  Determine patient's food preferences and provide high-protein, high-caloric foods as appropriate       INTERVENTIONS:  - Monitor oral intake, urinary output, labs, and treatment plans  - Assess nutrition and hydration status and recommend course of action  - Evaluate amount of meals eaten  - Assist patient with eating if necessary   - Allow adequate time for meals  - Recommend/ encourage appropriate diets, oral nutritional supplements, and vitamin/mineral supplements  - Order, calculate, and assess calorie counts as needed  - Recommend, monitor, and adjust tube feedings and TPN/PPN based on assessed needs  - Assess need for intravenous fluids  - Provide specific nutrition/hydration education as appropriate  - Include patient/family/caregiver in decisions related to nutrition  Outcome: Progressing     Problem: SELF HARM/SUICIDALITY  Goal: Will have no self-injury during hospital stay  Description: INTERVENTIONS:  - Q 15 MINUTES: Routine safety checks  - Q WAKING SHIFT & PRN: Assess risk to determine if routine checks are adequate to maintain patient safety  - Encourage patient to participate actively in care by formulating a plan to combat response to suicidal ideation, identify supports and resources  Outcome: Progressing     Problem: DEPRESSION  Goal: Will be euthymic at discharge  Description: INTERVENTIONS:  - Administer medication as ordered  - Provide emotional support via 1:1 interaction with staff  - Encourage involvement in milieu/groups/activities  - Monitor for social isolation  Outcome: Progressing     Problem: ANXIETY  Goal: Will report anxiety at manageable levels  Description: INTERVENTIONS:  - Administer medication as ordered  - Teach and encourage coping skills  - Provide emotional support  - Assess patient/family for anxiety and ability to cope  Outcome: Progressing  Goal: By discharge: Patient will verbalize 2 strategies to deal with anxiety  Description: Interventions:  - Identify any obvious source/trigger to anxiety  - Staff will assist patient in applying identified coping technique/skills  - Encourage attendance of scheduled groups and activities  Outcome: Progressing     Problem: Ineffective Coping  Goal: Participates in unit activities  Description: Interventions:  - Provide therapeutic environment   - Provide required programming   - Redirect inappropriate behaviors   Outcome: Progressing     Problem: DISCHARGE PLANNING  Goal: Discharge to home or other facility with appropriate resources  Description: INTERVENTIONS:  - Identify barriers to discharge w/patient and caregiver  - Arrange for needed discharge resources and transportation as appropriate  - Identify discharge learning needs (meds, wound care, etc )  - Arrange for interpretive services to assist at discharge as needed  - Refer to Case Management Department for coordinating discharge planning if the patient needs post-hospital services based on physician/advanced practitioner order or complex needs related to functional status, cognitive ability, or social support system  Outcome: Progressing

## 2021-03-10 PROCEDURE — 99232 SBSQ HOSP IP/OBS MODERATE 35: CPT | Performed by: STUDENT IN AN ORGANIZED HEALTH CARE EDUCATION/TRAINING PROGRAM

## 2021-03-10 RX ADMIN — SERTRALINE HYDROCHLORIDE 200 MG: 100 TABLET ORAL at 08:19

## 2021-03-10 RX ADMIN — PANTOPRAZOLE SODIUM 20 MG: 20 TABLET, DELAYED RELEASE ORAL at 06:30

## 2021-03-10 RX ADMIN — METFORMIN HYDROCHLORIDE 500 MG: 500 TABLET ORAL at 08:19

## 2021-03-10 RX ADMIN — ARIPIPRAZOLE 2 MG: 2 TABLET ORAL at 08:18

## 2021-03-10 RX ADMIN — BUSPIRONE HYDROCHLORIDE 10 MG: 10 TABLET ORAL at 08:19

## 2021-03-10 RX ADMIN — BUSPIRONE HYDROCHLORIDE 10 MG: 10 TABLET ORAL at 17:51

## 2021-03-10 RX ADMIN — Medication 1000 UNITS: at 08:19

## 2021-03-10 RX ADMIN — METFORMIN HYDROCHLORIDE 500 MG: 500 TABLET ORAL at 16:29

## 2021-03-10 NOTE — PROGRESS NOTES
Progress Note - Behavioral Health   Frannie Jj 36 y o  female MRN: [id number]  Unit/Bed#: [facility] 254-01 Encounter: [id number]    Assessment/Plan   Principal Problem:    Severe episode of recurrent major depressive disorder, without psychotic features ([address] )  Active Problems:    Iron deficiency anemia due to chronic blood loss    Type 2 diabetes mellitus without complication, without long-term current use of insulin ([facility])    Hypertension, essential    Surgical menopause    Medical clearance for psychiatric admission    Generalized anxiety disorder      Subjective: Patient was seen, chart reviewed and case discussed with team  Patient says that her mood has improved but remains anxious  Mood swings have improved  Sleep and appetite are good  Blood pressures have improved  Denies SI/HI/AH/VH  Denies any side effects from abilify  She is visible in the groups and activities      Psychiatric Review of Systems:  Behavior over the last 24 hours:  improved  Sleep: normal  Appetite: normal  Medication side effects: No  ROS: no complaints, all others negative    Current Medications:  Current Facility-Administered Medications   Medication Dose Route Frequency    acetaminophen (TYLENOL) tablet 650 mg  650 mg Oral Q6H PRN    aluminum-magnesium hydroxide-simethicone (MYLANTA) oral suspension 30 mL  30 mL Oral Q4H PRN    ARIPiprazole (ABILIFY) tablet 2 mg  2 mg Oral Daily    artificial tear (LUBRIFRESH P M ) ophthalmic ointment 1 application  1 application Both Eyes L1F PRN    haloperidol lactate (HALDOL) injection 2 5 mg  2 5 mg Intramuscular Q4H PRN Max 4/day    And    LORazepam (ATIVAN) injection 1 mg  1 mg Intramuscular Q4H PRN Max 4/day    And    benztropine (COGENTIN) injection 0 5 mg  0 5 mg Intramuscular Q4H PRN Max 4/day    haloperidol lactate (HALDOL) injection 5 mg  5 mg Intramuscular Q4H PRN Max 4/day    And    LORazepam (ATIVAN) injection 2 mg  2 mg Intramuscular Q4H PRN Max 4/day    And    benztropine (COGENTIN) injection 1 mg  1 mg Intramuscular Q4H PRN Max 4/day    benztropine (COGENTIN) injection 1 mg  1 mg Intramuscular Q4H PRN Max 6/day    benztropine (COGENTIN) tablet 1 mg  1 mg Oral Q4H PRN Max 6/day    bisacodyl (DULCOLAX) rectal suppository 10 mg  10 mg Rectal Daily PRN    busPIRone (BUSPAR) tablet 10 mg  10 mg Oral BID    cholecalciferol (VITAMIN D3) tablet 1,000 Units  1,000 Units Oral Daily    haloperidol (HALDOL) tablet 1 mg  1 mg Oral Q6H PRN    haloperidol (HALDOL) tablet 2 5 mg  2 5 mg Oral Q4H PRN Max 4/day    haloperidol (HALDOL) tablet 5 mg  5 mg Oral Q4H PRN Max 4/day    hydrOXYzine HCL (ATARAX) tablet 25 mg  25 mg Oral Q6H PRN Max 4/day    hydrOXYzine HCL (ATARAX) tablet 50 mg  50 mg Oral Q4H PRN Max 4/day    Or    LORazepam (ATIVAN) injection 1 mg  1 mg Intramuscular Q4H PRN    ibuprofen (MOTRIN) tablet 600 mg  600 mg Oral Q8H PRN    loperamide (IMODIUM) capsule 2 mg  2 mg Oral Q4H PRN    LORazepam (ATIVAN) tablet 1 mg  1 mg Oral Q4H PRN Max 6/day    Or    LORazepam (ATIVAN) injection 2 mg  2 mg Intramuscular Q6H PRN Max 3/day    metFORMIN (GLUCOPHAGE) tablet 500 mg  500 mg Oral BID With Meals    pantoprazole (PROTONIX) EC tablet 20 mg  20 mg Oral Early Morning    polyethylene glycol (MIRALAX) packet 17 g  17 g Oral Daily PRN    senna-docusate sodium (SENOKOT S) 8 6-50 mg per tablet 1 tablet  1 tablet Oral Daily PRN    sertraline (ZOLOFT) tablet 200 mg  200 mg Oral Daily    traZODone (DESYREL) tablet 50 mg  50 mg Oral HS PRN       Behavioral Health Medications: all current active meds have been reviewed  Vitals:  Vitals:    03/10/21 0726   BP: 143/90   Pulse: 75   Resp: 18   Temp: (!) 97 1 °F (36 2 °C)   SpO2:        Laboratory results:  I have personally reviewed all pertinent laboratory/tests results      Mental Status Evaluation:  Appearance:  age appropriate and casually dressed   Behavior:  restless and fidgety   Speech:  normal pitch and normal volume   Mood: anxious   Affect:  labile and mood-congruent   Language Appropriate   Thought Process:  goal directed, logical and perserverative   Thought Content:  normal   Perceptual Disturbances: None   Risk Potential: Suicidal Ideations none, Homicidal Ideations none and Potential for Aggression No   Sensorium:  person, place, time/date, situation, day of week, month of year and year   Cognition:  recent and remote memory grossly intact   Consciousness:  alert    Attention: attention span appeared shorter than expected for age   Insight:  fair   Judgment: fair   Gait/Station: normal gait/station   Motor Activity: no abnormal movements     Progress Toward Goals: Progressing    Recommended Treatment: Continue with pharmacotherapy, group therapy, milieu therapy and occupational therapy

## 2021-03-10 NOTE — NURSING NOTE
Pt is visible, social on unit  Denies questions concerns  Attended Wrap Up group and Snack this evening

## 2021-03-10 NOTE — PROGRESS NOTES
Status: Pt appears to be happy, loud and content on the unit       Medications: Buspar increased to 10mg / PRN: none     D/C: Next week ?      03/10/21 0871   Team Meeting   Meeting Type Daily Rounds   Team Members Present   Team Members Present Physician;Nurse;;Occupational Therapist   Physician Team Member Dr Livier Zamora / Ai Herrera Team Member Brent Yanez / Maral Schuster Management Team Member Lamberto Altman / Sammie Church   OT Team Member Sandi   Patient/Family Present   Patient Present No   Patient's Family Present No

## 2021-03-10 NOTE — PLAN OF CARE
Problem: SELF HARM/SUICIDALITY  Goal: Will have no self-injury during hospital stay  Description: INTERVENTIONS:  - Q 15 MINUTES: Routine safety checks  - Q WAKING SHIFT & PRN: Assess risk to determine if routine checks are adequate to maintain patient safety  - Encourage patient to participate actively in care by formulating a plan to combat response to suicidal ideation, identify supports and resources  Outcome: Progressing     Problem: DEPRESSION  Goal: Will be euthymic at discharge  Description: INTERVENTIONS:  - Administer medication as ordered  - Provide emotional support via 1:1 interaction with staff  - Encourage involvement in milieu/groups/activities  - Monitor for social isolation  Outcome: Progressing     Problem: Ineffective Coping  Goal: Participates in unit activities  Description: Interventions:  - Provide therapeutic environment   - Provide required programming   - Redirect inappropriate behaviors   Outcome: Progressing

## 2021-03-10 NOTE — PROGRESS NOTES
Pt calm and cooperative, pleasant and appropriate during interaction  Pt reports sleeping well overnight  Reports mood has improved and feels meds are helping  Pt states prior to coming in she was laying in bed and not completing ADLs  Pt has been visible on and social with peers since being on the unit

## 2021-03-11 ENCOUNTER — TELEPHONE (OUTPATIENT)
Dept: FAMILY MEDICINE CLINIC | Facility: CLINIC | Age: 41
End: 2021-03-11

## 2021-03-11 PROCEDURE — 99232 SBSQ HOSP IP/OBS MODERATE 35: CPT | Performed by: PHYSICIAN ASSISTANT

## 2021-03-11 RX ADMIN — BUSPIRONE HYDROCHLORIDE 10 MG: 10 TABLET ORAL at 17:00

## 2021-03-11 RX ADMIN — PANTOPRAZOLE SODIUM 20 MG: 20 TABLET, DELAYED RELEASE ORAL at 06:12

## 2021-03-11 RX ADMIN — METFORMIN HYDROCHLORIDE 500 MG: 500 TABLET ORAL at 08:29

## 2021-03-11 RX ADMIN — METFORMIN HYDROCHLORIDE 500 MG: 500 TABLET ORAL at 16:23

## 2021-03-11 RX ADMIN — ARIPIPRAZOLE 2 MG: 2 TABLET ORAL at 08:29

## 2021-03-11 RX ADMIN — SERTRALINE HYDROCHLORIDE 200 MG: 100 TABLET ORAL at 08:29

## 2021-03-11 RX ADMIN — BUSPIRONE HYDROCHLORIDE 10 MG: 10 TABLET ORAL at 08:29

## 2021-03-11 RX ADMIN — Medication 1000 UNITS: at 08:29

## 2021-03-11 NOTE — DISCHARGE INSTR - OTHER ORDERS
You will be discharged to your home today at [place] [address], [address] [place] via [facility] acute hospital is a toll-free telephone number for people in [facility] who are seeking a listening ear for additional support in their recovery from mental illness  The PEER LINE is peer-run and peer-friendly  You can call the Peer Line 24 hours a day  Phone: 8-282-NU-PEERS /(8-360.825.7124)   Emergency [address] [facility]: [phone]  [address]  [address] [facility] , [place], [address] [place]     Text CONNECT to 731156 from anywhere in the [place], anytime, about any type of crisis  A live, trained Crisis Counselor receives the text and lets you know that they are here to listen  The volunteer Crisis Counselor will help you move from a hot moment to a cool moment  Warm Line: [phone], [phone], [phone]  If it is not quite a crisis, but you want to talk to someone, 24 hours/day, 7 days/week:  Someone to listen; someone who cares  The [facility] ([facility]) offers various education & support groups for you & your family  For more information visit their website at   http://www [employer]/     Dial 2-1-1 to get connected/get help  Free, confidential information & referral available 24/7: Aging Services, Child & Youth Services, Counseling, Education/Training, Food/Shelter/Clothing, Health Services, Parenting, Substance Abuse, Support Groups, Volunteer Opportunities, & much more  Phone: 2-1-1 or [phone], Web: DLQ [id number] [facility], Email: [email or web address]                What you need to know about coronavirus disease 2019 (COVID-19)     What is coronavirus disease 2019 (COVID-19)? Coronavirus disease 2019 (COVID-19) is a respiratory illness that can spread from person to person   The virus that causes COVID-19 is a novel coronavirus that was first identified during an investigation into an outbreak in [place], [place]  Can people in the [place]  get COVID-19? Yes  COVID-19 is spreading from person to person in parts of the [place]  Risk of infection with COVID-19 is higher for people who are close contacts of someone known to have COVID-19, for example healthcare workers, or household members  Other people at higher risk for infection are those who live in or have recently been in an area with ongoing spread of COVID-19  Learn more about places with ongoing spread at   [facility]  html#geographic  Have there been cases of COVID-19 in the [place] ?   Yes  The first case of COVID-19 in the [place] was reported on January 21, 2020  The current count of cases of COVID-19 in the [place] is available on [employer] at "[employer]  How does COVID-19 spread? The virus that causes COVID-19 probably emerged from an animal source, but is now spreading from person to person  The virus is thought to spread mainly between people who are in close contact with one another (within about 6 feet) through respiratory droplets produced when an infected person coughs or sneezes  It also may be possible that a person can get COVID-19 by touching a surface or object that has the virus on it and then touching their own mouth, nose, or possibly their eyes, but this is not thought to be the main way the virus spreads  Learn what is known about the spread of newly emerged coronaviruses at [facility]  What are the symptoms of COVID-19? Patients with COVID-19 have had mild to severe respiratory illness with symptoms of   fever   cough   shortness of breath  What are severe complications from this virus? Some patients have pneumonia in both lungs, multi-organ failure and in some cases death  How can I help protect myself?    People can help protect themselves from respiratory illness with everyday preventive actions  Avoid close contact with people who are sick  Avoid touching your eyes, nose, and mouth with unwashed hands  Wash your hands often with soap and water for at least 20 seconds  Use an alcohol-based hand  that contains at least 60% alcohol if soap and water are not available  If you are sick, to keep from spreading respiratory illness to others, you should   Stay home when you are sick  Cover your cough or sneeze with a tissue, then throw the tissue in the trash  Clean and disinfect frequently touched objects and surfaces  What should I do if I recently traveled from an area with ongoing spread of COVID-19? If you have traveled from an affected area, there may be restrictions on your movements for up to 2 weeks  If you develop symptoms during that period (fever, cough, trouble breathing), seek medical advice  Call the office of your health care provider before you go, and tell them about your travel and your symptoms  They will give you instructions on how to get care without exposing other people to your illness  While sick, avoid contact with people, don't go out and delay any travel to reduce the possibility of spreading illness to others  Is there a vaccine? There is currently no vaccine to protect against COVID-19  The best way to prevent infection is to take everyday preventive actions, like avoiding close contact with people who are sick and washing your hands often  Is there a treatment? There is no specific antiviral treatment for COVID-19  People with COVID-19 can seek medical care to help relieve symptoms    For more information: www cdc gov/ICQJT24MN 328232-Q 03/03/2020       What to do if you are sick withcoronavirus disease 2019 (COVID-19)     If you are sick with COVID-19 or suspect you are infected with the virus that causes COVID-19, follow the steps below to help prevent the disease from spreading to people in your home and community  Stay home except to get medical care   You should restrict activities outside your home, except for getting medical care  Do not go to work, school, or public areas  Avoid using public transportation, ride-sharing, or taxis  Separate yourself from other people and animals in your home  People: As much as possible, you should stay in a specific room and away from other people in your home  Also, you should use a separate bathroom, if available  Animals: Do not handle pets or other animals while sick  See COVID-19 and Animals for more information  Call ahead before visiting your doctor   If you have a medical appointment, call the healthcare provider and tell them that you have or may have COVID-19  This will help the healthcare provider's office take steps to keep other people from getting infected or exposed  Wear a facemask  You should wear a facemask when you are around other people (e g , sharing a room or vehicle) or pets and before you enter a healthcare provider's office  If you are not able to wear a facemask (for example, because it causes trouble breathing), then people who live with you should not stay in the same room with you, or they should wear a facemask if they enteryour room  Cover your coughs and sneezes   Cover your mouth and nose with a tissue when you cough or sneeze  Throw used tissues in a lined trash can; immediately wash your hands with soap and water for at least 20 seconds or clean your hands with an alcohol-based hand  that contains at least 60 to 95% alcohol, covering all surfaces of your hands and rubbing them together until they feel dry  Soap and water should be used preferentially if hands are visibly dirty  Avoid sharing personal household items   You should not share dishes, drinking glasses, cups, eating utensils, towels, or bedding with other people or pets in your home  After using these items, they should be washed thoroughly with soap and water  Clean your hands often  Wash your hands often with soap and water for at least 20 seconds  If soap and water are not available, clean your hands with an alcohol-based hand  that contains at least 60% alcohol, covering all surfaces of your hands and rubbing them together until they feel dry  Soap and water should be used preferentially if hands are visibly dirty  Avoid touching your eyes, nose, and mouth with unwashed hands  Clean all "high-touch" surfaces every day  High touch surfaces include counters, tabletops, doorknobs, bathroom fixtures, toilets, phones, keyboards, tablets, and bedside tables  Also, clean any surfaces that may have blood, stool, or body fluids on them  Use a household cleaning spray or wipe, according to the label instructions  Labels contain instructions for safe and effective use of the cleaning product including precautions you should take when applying the product, such as wearing gloves and making sure you have good ventilation during use of the product  Monitor your symptoms  Seek prompt medical attention if your illness is worsening (e g , difficulty breathing)  Before seeking care, call your healthcare provider and tell them that you have, or are being evaluated for, COVID-19  Put on a facemask before you enter the facility  These steps will help the healthcare provider's office to keep other people in the office or waiting room from getting infected or exposed  Ask your healthcare provider to call the local or [facility] health department  Persons who are placed under active monitoring or facilitated self-monitoring should follow instructions provided by their local health department or occupational health professionals, as appropriate  If you have a medical emergency and need to call 911, notify the dispatch personnel that you have, or are being evaluated for COVID-19  If possible, put on a facemask before emergency medical services arrive    Discontinuing home isolation  Patients with confirmed COVID-19 should remain under home isolation precautions until the risk of secondary transmission to others is thought to be low  The decision to discontinue home isolation precautions should be made on a case-by-case basis, in consultation with healthcare providers and state and local health departments  For more information: www cdc gov/MOENH49UY 601383-J 02/24/2020       Stay home when you are sick, except to get medical care  Wash your hands often with soap and water for at least 20 seconds  Cover your cough or sneeze with a tissue, then throw the tissue in the trash  Clean and disinfect frequently touched objects and surfaces  Avoid touching your eyes, nose, and mouth  STOP THE SPREAD OF GERMS  For more information: www cdc gov/COVID19 Avoid close contact with people who are sick  Help prevent the spread of respiratory diseases like COVID-19

## 2021-03-11 NOTE — DISCHARGE INSTR - APPOINTMENTS
Sola Arias RN, our [employer], will be calling you after your discharge, on the phone number that you provided  She will be available as an additional support, if needed  If you wish to speak with her, you may contact Catrina Odom at [phone]

## 2021-03-11 NOTE — PROGRESS NOTES
Progress Note - Behavioral Health   Frannie Jj 36 y o  female MRN: [id number]  Unit/Bed#: [facility] 254-01 Encounter: [id number]    Assessment/Plan   Principal Problem:    Severe episode of recurrent major depressive disorder, without psychotic features ([facility] [address] )  Active Problems:    Iron deficiency anemia due to chronic blood loss    Type 2 diabetes mellitus without complication, without long-term current use of insulin ([facility])    Hypertension, essential    Surgical menopause    Medical clearance for psychiatric admission    Generalized anxiety disorder      Subjective: Patient was seen, chart reviewed and case discussed with team   Patient today appears less depressed with brighter affect  Reports her mood is more stable and feels more "even "  Reports sleep is poor due to other loud patients     Is more optimistic and future oriented  States anxiety is manageable  Not showing signs of anthony or agitation  Denied psychosis and delusional material   Seen out going to groups  Medication compliant  Appears to be tolerating medications well without serious side effects  Is relieved that she has outpatient services set up  Pleasant and cooperative      Psychiatric Review of Systems:  Behavior over the last 24 hours:  improved  Sleep: varies  Appetite: normal  Medication side effects: No  ROS: no complaints, all others negative    Current Medications:  Current Facility-Administered Medications   Medication Dose Route Frequency    acetaminophen (TYLENOL) tablet 650 mg  650 mg Oral Q6H PRN    aluminum-magnesium hydroxide-simethicone (MYLANTA) oral suspension 30 mL  30 mL Oral Q4H PRN    ARIPiprazole (ABILIFY) tablet 2 mg  2 mg Oral Daily    artificial tear (LUBRIFRESH P M ) ophthalmic ointment 1 application  1 application Both Eyes C0W PRN    haloperidol lactate (HALDOL) injection 2 5 mg  2 5 mg Intramuscular Q4H PRN Max 4/day    And    LORazepam (ATIVAN) injection 1 mg  1 mg Intramuscular Q4H PRN Max 4/day    And  benztropine (COGENTIN) injection 0 5 mg  0 5 mg Intramuscular Q4H PRN Max 4/day    haloperidol lactate (HALDOL) injection 5 mg  5 mg Intramuscular Q4H PRN Max 4/day    And    LORazepam (ATIVAN) injection 2 mg  2 mg Intramuscular Q4H PRN Max 4/day    And    benztropine (COGENTIN) injection 1 mg  1 mg Intramuscular Q4H PRN Max 4/day    benztropine (COGENTIN) injection 1 mg  1 mg Intramuscular Q4H PRN Max 6/day    benztropine (COGENTIN) tablet 1 mg  1 mg Oral Q4H PRN Max 6/day    bisacodyl (DULCOLAX) rectal suppository 10 mg  10 mg Rectal Daily PRN    busPIRone (BUSPAR) tablet 10 mg  10 mg Oral BID    cholecalciferol (VITAMIN D3) tablet 1,000 Units  1,000 Units Oral Daily    haloperidol (HALDOL) tablet 1 mg  1 mg Oral Q6H PRN    haloperidol (HALDOL) tablet 2 5 mg  2 5 mg Oral Q4H PRN Max 4/day    haloperidol (HALDOL) tablet 5 mg  5 mg Oral Q4H PRN Max 4/day    hydrOXYzine HCL (ATARAX) tablet 25 mg  25 mg Oral Q6H PRN Max 4/day    hydrOXYzine HCL (ATARAX) tablet 50 mg  50 mg Oral Q4H PRN Max 4/day    Or    LORazepam (ATIVAN) injection 1 mg  1 mg Intramuscular Q4H PRN    ibuprofen (MOTRIN) tablet 600 mg  600 mg Oral Q8H PRN    loperamide (IMODIUM) capsule 2 mg  2 mg Oral Q4H PRN    LORazepam (ATIVAN) tablet 1 mg  1 mg Oral Q4H PRN Max 6/day    Or    LORazepam (ATIVAN) injection 2 mg  2 mg Intramuscular Q6H PRN Max 3/day    metFORMIN (GLUCOPHAGE) tablet 500 mg  500 mg Oral BID With Meals    pantoprazole (PROTONIX) EC tablet 20 mg  20 mg Oral Early Morning    polyethylene glycol (MIRALAX) packet 17 g  17 g Oral Daily PRN    senna-docusate sodium (SENOKOT S) 8 6-50 mg per tablet 1 tablet  1 tablet Oral Daily PRN    sertraline (ZOLOFT) tablet 200 mg  200 mg Oral Daily    traZODone (DESYREL) tablet 50 mg  50 mg Oral HS PRN       Behavioral Health Medications: all current active meds have been reviewed and continue current psychiatric medications      Vitals:  Vitals:    03/11/21 0733   BP: 131/69 Pulse: 67   Resp: 18   Temp: 97 9 °F (36 6 °C)   SpO2:        Laboratory results:    I have personally reviewed all pertinent laboratory/tests results  Most Recent Labs:   Lab Results   Component Value Date    WBC 6 16 03/06/2021    RBC 5 42 (H) 03/06/2021    HGB 15 8 (H) 03/06/2021    HCT 49 1 (H) 03/06/2021     03/06/2021    RDW 13 4 03/06/2021    NEUTROABS 3 92 03/06/2021    SODIUM 142 03/06/2021    K 3 6 03/06/2021     03/06/2021    CO2 28 03/06/2021    BUN 21 03/06/2021    CREATININE 0 75 03/06/2021    GLUC 76 03/06/2021    GLUF 79 04/06/2020    CALCIUM 9 2 03/06/2021    AST 28 03/06/2021    ALT 76 03/06/2021    ALKPHOS 70 03/06/2021    TP 7 5 03/06/2021    ALB 3 7 03/06/2021    TBILI 0 40 03/06/2021    CHOLESTEROL 172 03/06/2021    HDL 29 (L) 03/06/2021    TRIG 120 03/06/2021    LDLCALC 119 (H) 03/06/2021    NONHDLC 143 03/06/2021    RCV6HHRMXLFQ 2 270 03/06/2021    PREGUR negative 03/05/2021    PREGSERUM Negative 03/06/2021    RPR Non-Reactive 03/06/2021    HGBA1C 5 3 03/06/2021     03/06/2021       Mental Status Evaluation:  Appearance:  casually dressed   Behavior:  cooperative   Speech:  normal pitch and normal volume   Mood:  less depressed and anxious   Affect:  mood-congruent   Language Appropriate   Thought Process:  goal directed and logical   Thought Content:  normal   Perceptual Disturbances: None   Risk Potential: Denied SI/HI  Potential for aggression: No   Sensorium:  person, place, time   Cognition:  recent and remote memory grossly intact   Consciousness:  alert and awake    Attention: attention span and concentration were age appropriate   Insight:  fair   Judgment: fair   Gait/Station: normal gait/station and normal balance   Motor Activity: no abnormal movements     Progress Toward Goals: progressing    Recommended Treatment: Continue with pharmacotherapy, group therapy, milieu therapy and occupational therapy  1   Continue current medications  2    Disposition planning with tentative discharge tomorrow     Risks, benefits and possible side effects of Medications:   Risks, benefits, and possible side effects of medications explained to patient and patient verbalizes understanding

## 2021-03-11 NOTE — PROGRESS NOTES
03/11/21 0930 03/11/21 1000 03/11/21 1315   Activity/Group Checklist   Group Admission/Discharge  (relapse prevention plan) Community meeting  (goal setting - relationship with self ) Anger management   Attendance Attended Attended Attended   Attendance Duration (min) 16-30 16-30 46-60   Interactions Interacted appropriately  (expressed feeling more hopeful and ready for discharge) Interacted appropriately Interacted appropriately   Affect/Mood Appropriate Appropriate Appropriate   Goals Achieved Identified triggers; Identified relapse prevention strategies; Discussed safety plan;Discussed coping strategies; Discussed discharge plans; Identified resources and support systems  (verbalized motivation to attend outpatient therapy at [facility]) Identified feelings; Able to listen to others; Able to engage in interactions Identified triggers; Identified feelings; Discussed coping strategies; Able to listen to others; Able to engage in interactions; Able to reflect/comment on own behavior      03/11/21 1415   Activity/Group Checklist   Group Other (Comment)  (mindful breathing exercise)   Attendance Did not attend   Attendance Duration (min)  --    Interactions  --    Affect/Mood  --    Goals Achieved  --    Pt continues to attend and participate in therapeutic groups  She completed the relapse prevention plan in preparation for discharge and demonstrates insight into warning signs and coping skills  Pt verbalized having a supportive family and wants to be able to share relapse prevention plan with supports to assist them in understanding her warning signs

## 2021-03-11 NOTE — DISCHARGE INSTRUCTIONS
Anxiety   WHAT YOU SHOULD KNOW:   Anxiety is a condition that causes you to feel excessive worry, uneasiness, or fear  Family or work stress, smoking, caffeine, and alcohol can increase your risk for anxiety  Certain medicines or health conditions can also increase your risk  Anxiety may begin gradually, and can become a long-term condition if it is not managed or treated  AFTER YOU LEAVE:   Medicines:   · Medicines  can help you feel more calm and relaxed, and decrease your symptoms  · Take your medicine as directed  Contact your healthcare provider if you think your medicine is not helping or if you have side effects  Tell him if you are allergic to any medicine  Keep a list of the medicines, vitamins, and herbs you take  Include the amounts, and when and why you take them  Bring the list or the pill bottles to follow-up visits  Carry your medicine list with you in case of an emergency  Follow up with your healthcare provider within 2 weeks or as directed:  Write down your questions so you remember to ask them during your visits  Manage anxiety:   · Go to counseling as directed  Cognitive behavioral therapy can help you understand and change how you react to events that trigger your symptoms  · Find ways to manage your symptoms  Activities such as exercise, meditation, or listening to music can help you relax  · Practice deep breathing  Breathing can change how your body reacts to stress  Focus on taking slow, deep breaths several times a day, or during an anxiety attack  Breathe in through your nose, and out through your mouth  · Avoid caffeine  Caffeine can make your symptoms worse  Avoid foods or drinks that are meant to increase your energy level  · Limit or avoid alcohol  Ask your healthcare provider if alcohol is safe for you  You may not be able to drink alcohol if you take certain anxiety or depression medicines  Limit alcohol to 1 drink per day if you are a woman   Limit alcohol to 2 drinks per day if you are a man  A drink of alcohol is 12 ounces of beer, 5 ounces of wine, or 1½ ounces of liquor  Contact your healthcare provider if:   · Your symptoms get worse or do not get better with treatment  · You think your medicine may be causing side effects  · Your anxiety keeps you from doing your regular daily activities  · You have new symptoms since your last visit  · You have questions or concerns about your condition or care  Seek care immediately or call 911 if:   · You have chest pain, tightness, or heaviness that may spread to your shoulders, arms, jaw, neck, or back  · You feel like hurting yourself or someone else  · You feel dizzy, lightheaded, or faint  © 2014 [address] is for End User's use only and may not be sold, redistributed or otherwise used for commercial purposes  All illustrations and images included in CareNotes® are the copyrighted property of A D A M , Inc  or Sharan Bonilla  The above information is an  only  It is not intended as medical advice for individual conditions or treatments  Talk to your doctor, nurse or pharmacist before following any medical regimen to see if it is safe and effective for you  Depression   WHAT YOU NEED TO KNOW:   Depression is a medical condition that causes feelings of sadness or hopelessness that do not go away  Depression may cause you to lose interest in things you used to enjoy  These feelings may interfere with your daily life  DISCHARGE INSTRUCTIONS:   Call your local emergency number (911 in the [address] [place] [address],[address]) if:   · You think about harming yourself or someone else  · You have done something on purpose to hurt yourself  Call your therapist or doctor if:   · Your symptoms do not improve  · You cannot make it to your next appointment  · You have new symptoms  · You have questions or concerns about your condition or care      The following resources are available at any time to help you, if needed:   · [facility] [address] [facility]: 6-608.809.1715 (5-279-885-DJAE)     · Suicide Hotline: 6-906.497.3862 (8-728-OUZJALA)     · For a list of international numbers: https://save org/find-help/international-resources/    Medicines:   · Antidepressants  may be given to improve or balance your mood  You may need to take this medicine for several weeks before you begin to feel better  · Take your medicine as directed  Contact your healthcare provider if you think your medicine is not helping or if you have side effects  Tell him of her if you are allergic to any medicine  Keep a list of the medicines, vitamins, and herbs you take  Include the amounts, and when and why you take them  Bring the list or the pill bottles to follow-up visits  Carry your medicine list with you in case of an emergency  Therapy  is often used together with medicine to relieve depression  Therapy is a way for you to talk about your feelings and anything that may be causing depression  Therapy can be done alone or in a group  It may also be done with family members or a significant other  Self-care:   · Get regular physical activity  Try to be active for 30 minutes, 3 to 5 days a week  Physical activity can help relieve depression  Work with your healthcare provider to develop a plan that you enjoy  It may help to ask someone to be active with you  · Create a regular sleep schedule  A routine can help you relax before bed  Listen to music, read, or do yoga  Try to go to bed and wake up at the same time every day  Sleep is important for emotional health  · Eat a variety of healthy foods  Healthy foods include fruits, vegetables, whole-grain breads, low-fat dairy products, lean meats, fish, and cooked beans  A healthy meal plan is low in fat, salt, and added sugar  · Do not drink alcohol or use drugs  Alcohol and drugs can make depression worse   Talk to your therapist or doctor if you need help quitting  Follow up with your healthcare provider as directed: Your healthcare provider will monitor your progress at follow-up visits  He or she will also monitor your medicine if you take antidepressants  Your healthcare provider will ask if the medicine is helping  Tell him or her about any side effects or problems you may have with your medicine  The type or amount of medicine may need to be changed  Write down your questions so you remember to ask them during your visits  © Copyright [address] Information is for End User's use only and may not be sold, redistributed or otherwise used for commercial purposes  All illustrations and images included in CareNotes® are the copyrighted property of A D A M , Inc  or [address] [place], [place] [address]jenny   The above information is an  only  It is not intended as medical advice for individual conditions or treatments  Talk to your doctor, nurse or pharmacist before following any medical regimen to see if it is safe and effective for you

## 2021-03-11 NOTE — PROGRESS NOTES
Status: Pt denies all symptoms  Social on the unit        Medication: No changes / PRN: None     D/C: Friday via [address] [place] [address]     03/11/21 2081   Team Meeting   Meeting Type Daily Rounds   Team Members Present   Team Members Present Physician;Nurse;;Occupational Therapist   Physician Team Member Dr Fawn Donovan / Rc Villegas Team Member Chalino Trveizo / Darral Nissen Management Team Member Deena Jenkins / Gabino Blank   OT Team Member Ellen Bates / Angus Pizarro Student   Patient/Family Present   Patient Present No   Patient's Family Present No

## 2021-03-11 NOTE — TELEPHONE ENCOUNTER
Yanet Gibson from [facility] called to say that the patient has been there and that she is being D/C tomorrow 3/12/2021

## 2021-03-11 NOTE — CASE MANAGEMENT
CM sent Deena Brooks request to [employer] for pt to be transported home tomorrow, Friday 3/12/2021  [facility] scheduled tentative  for 10am on 3/12/21      CM called [facility] ([phone]) left  asking them to call back if they can schedule pt for intake  CM called [facility] ([phone]) to schedule pt for intake     Pt scheduled for a PHONE intake with Darius Meléndez on Thursday March 18, 2021 at 3pm   (CM added appt to pt AVS)

## 2021-03-11 NOTE — CASE MANAGEMENT
CM called Keenan Davies @ [facility] ([phone]) as pt wanted to inform Dr Meron Gaytan that she has been inpatient and will dc on 3/12/21  Pt did not wish to be scheduled for a follow up appointment at this time  They will inform Dr Meron Gaytan of pt admission

## 2021-03-11 NOTE — PROGRESS NOTES
Pt calm and cooperative, pleasant during interaction  Denies questions or concerns  Denies SI/HI/AVH  Reports continued improvement of symptoms  Visible on unit and social with peers

## 2021-03-12 VITALS
HEART RATE: 91 BPM | TEMPERATURE: 97.4 F | HEIGHT: 67 IN | OXYGEN SATURATION: 98 % | SYSTOLIC BLOOD PRESSURE: 158 MMHG | RESPIRATION RATE: 17 BRPM | WEIGHT: 239.42 LBS | BODY MASS INDEX: 37.58 KG/M2 | DIASTOLIC BLOOD PRESSURE: 99 MMHG

## 2021-03-12 PROCEDURE — 99239 HOSP IP/OBS DSCHRG MGMT >30: CPT | Performed by: STUDENT IN AN ORGANIZED HEALTH CARE EDUCATION/TRAINING PROGRAM

## 2021-03-12 RX ORDER — ARIPIPRAZOLE 2 MG/1
2 TABLET ORAL DAILY
Qty: 30 TABLET | Refills: 1 | Status: SHIPPED | OUTPATIENT
Start: 2021-03-13

## 2021-03-12 RX ORDER — SERTRALINE HYDROCHLORIDE 100 MG/1
200 TABLET, FILM COATED ORAL DAILY
Qty: 60 TABLET | Refills: 1 | Status: SHIPPED | OUTPATIENT
Start: 2021-03-12

## 2021-03-12 RX ORDER — BUSPIRONE HYDROCHLORIDE 10 MG/1
10 TABLET ORAL 2 TIMES DAILY
Qty: 60 TABLET | Refills: 1 | Status: SHIPPED | OUTPATIENT
Start: 2021-03-12

## 2021-03-12 RX ADMIN — ARIPIPRAZOLE 2 MG: 2 TABLET ORAL at 08:23

## 2021-03-12 RX ADMIN — SERTRALINE HYDROCHLORIDE 200 MG: 100 TABLET ORAL at 08:22

## 2021-03-12 RX ADMIN — BUSPIRONE HYDROCHLORIDE 10 MG: 10 TABLET ORAL at 08:22

## 2021-03-12 RX ADMIN — Medication 1000 UNITS: at 08:22

## 2021-03-12 RX ADMIN — METFORMIN HYDROCHLORIDE 500 MG: 500 TABLET ORAL at 08:22

## 2021-03-12 RX ADMIN — PANTOPRAZOLE SODIUM 20 MG: 20 TABLET, DELAYED RELEASE ORAL at 06:31

## 2021-03-12 NOTE — PROGRESS NOTES
Pt remains pleasant and calm  Social with peers on the unit  Denies SI/HI, expresses hopefulness for discharge and denies any concerns  Attending groups and reports sleeping fairly well aside for when roommate woke overnight

## 2021-03-12 NOTE — NURSING NOTE
AVS discharge instructions reviewed with patient  Patient denies any questions or concerns and expresses readiness for discharge  Was discharged from the unit pleasant and cooperative

## 2021-03-12 NOTE — DISCHARGE SUMMARY
Discharge Summary - [address]iday 36 y o  female MRN: [id number]  Unit/Bed#: -01 Encounter: [id number]     Admission Date:   Admission Orders (From admission, onward)     Ordered        03/05/21 2258  ED TO DIFFERENT CAMPUS [facility] UNIT or INPATIENT MEDICAL UNIT to [facility] UNIT (using Discharge Readmit Navigator) - Admit Patient to [address] [place] [address]  Once                         Discharge Date: 3/12/2021    Attending Psychiatrist: Carlynn Apley MD    Reason for Admission/HPI:   History of Present Illness   Patient is a 59-year-old female who presented to [address] [place] [address] ED due to suicidal ideation without plan  She reported to crisis worker that she is increasingly socially withdrawn and cannot function at work  She also stated that she has had difficulty obtaining therapy appointments where she is has given up  That day she allegedly went for a long walk and contemplated jumping off something high  On initial psychiatric evaluation patient reported struggling with depression since the age of 16 and has been on medications since the age of 32  He stated over the last few years she has been through a lot including uterine carcinoma, undergone hysterectomy, currently in remission, issues with her menstrual cycle, and required to blood transfusions in the past   She also was increasingly upset 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  Currently is being treated by her PCP and prescribed Zoloft  She reported over the previous few weeks increased depression with symptoms of unintentional weight gain, anhedonia, hopelessness, and suicidal thoughts  She did report increased anxiety without panic attacks  She denied psychosis and delusional material   She did not show signs of anthony  She does not history of anthony    Patient has previous inpatient psychiatric hospitalization, previous suicide attempt, and did not have current outpatient psychiatrist or therapist     Psychosocial Stressors:  Occupational, health, social    Hospital Course:   Behavioral Health Medications:   current meds:   Current Facility-Administered Medications   Medication Dose Route Frequency    acetaminophen (TYLENOL) tablet 650 mg  650 mg Oral Q6H PRN    aluminum-magnesium hydroxide-simethicone (MYLANTA) oral suspension 30 mL  30 mL Oral Q4H PRN    ARIPiprazole (ABILIFY) tablet 2 mg  2 mg Oral Daily    artificial tear (LUBRIFRESH P M ) ophthalmic ointment 1 application  1 application Both Eyes S9F PRN    haloperidol lactate (HALDOL) injection 2 5 mg  2 5 mg Intramuscular Q4H PRN Max 4/day    And    LORazepam (ATIVAN) injection 1 mg  1 mg Intramuscular Q4H PRN Max 4/day    And    benztropine (COGENTIN) injection 0 5 mg  0 5 mg Intramuscular Q4H PRN Max 4/day    haloperidol lactate (HALDOL) injection 5 mg  5 mg Intramuscular Q4H PRN Max 4/day    And    LORazepam (ATIVAN) injection 2 mg  2 mg Intramuscular Q4H PRN Max 4/day    And    benztropine (COGENTIN) injection 1 mg  1 mg Intramuscular Q4H PRN Max 4/day    benztropine (COGENTIN) injection 1 mg  1 mg Intramuscular Q4H PRN Max 6/day    benztropine (COGENTIN) tablet 1 mg  1 mg Oral Q4H PRN Max 6/day    bisacodyl (DULCOLAX) rectal suppository 10 mg  10 mg Rectal Daily PRN    busPIRone (BUSPAR) tablet 10 mg  10 mg Oral BID    cholecalciferol (VITAMIN D3) tablet 1,000 Units  1,000 Units Oral Daily    haloperidol (HALDOL) tablet 1 mg  1 mg Oral Q6H PRN    haloperidol (HALDOL) tablet 2 5 mg  2 5 mg Oral Q4H PRN Max 4/day    haloperidol (HALDOL) tablet 5 mg  5 mg Oral Q4H PRN Max 4/day    hydrOXYzine HCL (ATARAX) tablet 25 mg  25 mg Oral Q6H PRN Max 4/day    hydrOXYzine HCL (ATARAX) tablet 50 mg  50 mg Oral Q4H PRN Max 4/day    Or    LORazepam (ATIVAN) injection 1 mg  1 mg Intramuscular Q4H PRN    ibuprofen (MOTRIN) tablet 600 mg  600 mg Oral Q8H PRN    loperamide (IMODIUM) capsule 2 mg 2 mg Oral Q4H PRN    LORazepam (ATIVAN) tablet 1 mg  1 mg Oral Q4H PRN Max 6/day    Or    LORazepam (ATIVAN) injection 2 mg  2 mg Intramuscular Q6H PRN Max 3/day    metFORMIN (GLUCOPHAGE) tablet 500 mg  500 mg Oral BID With Meals    pantoprazole (PROTONIX) EC tablet 20 mg  20 mg Oral Early Morning    polyethylene glycol (MIRALAX) packet 17 g  17 g Oral Daily PRN    senna-docusate sodium (SENOKOT S) 8 6-50 mg per tablet 1 tablet  1 tablet Oral Daily PRN    sertraline (ZOLOFT) tablet 200 mg  200 mg Oral Daily    traZODone (DESYREL) tablet 50 mg  50 mg Oral HS PRN       Patient was admitted to [facility] inpatient psychiatric unit on voluntary 201 commitment for safety and stabilization  On admission patient was continued on Zoloft 200mg QD for depression/anxiety and started on Buspar 5mg BID for anxiety  Due to overly elevated mood later in hospitalization Abilify 2mg QD was added for mood stabilization/depression augmentation  Buspar was increased to 10mg BID  She tolerated medications with no acute side effects  Her mood brightened over the course of her treatment, and she was seen in [facility] interacting appropriately with peers  She was seen attending groups  She did not demonstrate dangerous behavior to self or others during her inpatient stay  On day of discharge patient had reduced depression, controllable anxiety, denied psychosis, did not show signs of anthony, and denied suicidal/homicidal ideations  Mental Status at time of Discharge:     Appearance:  casually dressed   Behavior:  cooperative   Speech:  normal pitch and normal volume   Mood:  euthymic   Affect:  mood-congruent   Thought Process:  normal   Thought Content:  normal   Perceptual Disturbances: None   Risk Potential: Denied SI/HI    Potential for aggression: No   Sensorium:  person, place and time/date   Cognition:  recent and remote memory grossly intact   Consciousness:  alert and awake    Attention: attention span and concentration were age appropriate   Insight:  fair   Judgment: fair   Gait/Station: normal gait/station and normal balance   Motor Activity: no abnormal movements       Discharge Diagnosis:   Severe episode of recurrent major depressive disorder, without psychotic features  Generalized anxiety disorder      Discharge Medications:  See after visit summary for reconciled discharge medications provided to patient and family  Discharge instructions/Information to patient and family:   See after visit summary for information provided to patient and family  Provisions for Follow-Up Care:  See after visit summary for information related to follow-up care and any pertinent home health orders  Discharge Statement   I spent 33 minutes discharging the patient  This time was spent on the day of discharge  I had direct contact with the patient on the day of discharge  On day of discharge patient had mental status exam performed, discharge instructions/medications reviewed, and outpatient planning discussed  She was given 1 month plus 1 refill of scripts       Jono Walton PA-C

## 2021-03-12 NOTE — PROGRESS NOTES
Pt calm and cooperative  Out in the milieu, friendly  Pt expressed readiness to discharge  Denied SI, HI and AVH  Attended most groups today

## 2021-03-14 DIAGNOSIS — E11.9 TYPE 2 DIABETES MELLITUS WITHOUT COMPLICATION, WITHOUT LONG-TERM CURRENT USE OF INSULIN (HCC): ICD-10-CM

## 2021-03-15 ENCOUNTER — TRANSITIONAL CARE MANAGEMENT (OUTPATIENT)
Dept: FAMILY MEDICINE CLINIC | Facility: CLINIC | Age: 41
End: 2021-03-15

## 2021-03-16 NOTE — PROGRESS NOTES
Status: Pt denies SI  Reported poor sleep but feeling ready for dc today       Medication: No changes / PRN: None     D/C: Today via [address] [place] [address]      03/12/21 [id number]   Team Meeting   Meeting Type Daily Rounds   Team Members Present   Team Members Present Physician;Nurse;;Occupational Therapist   Physician Team Member Dr Singh Shaver / Stalin Crump / Paz Ford Team Member Alen Potts / Weston Eagle Management Team Member Jes Allen / Momo Myrick   OT Team Member Sandi   Patient/Family Present   Patient Present No   Patient's Family Present No

## 2021-03-23 ENCOUNTER — OFFICE VISIT (OUTPATIENT)
Dept: FAMILY MEDICINE CLINIC | Facility: CLINIC | Age: 41
End: 2021-03-23
Payer: COMMERCIAL

## 2021-03-23 VITALS
WEIGHT: 245 LBS | HEART RATE: 100 BPM | OXYGEN SATURATION: 97 % | BODY MASS INDEX: 38.45 KG/M2 | DIASTOLIC BLOOD PRESSURE: 72 MMHG | RESPIRATION RATE: 16 BRPM | SYSTOLIC BLOOD PRESSURE: 130 MMHG | HEIGHT: 67 IN

## 2021-03-23 DIAGNOSIS — E11.9 TYPE 2 DIABETES MELLITUS WITHOUT COMPLICATION, WITHOUT LONG-TERM CURRENT USE OF INSULIN (HCC): Primary | ICD-10-CM

## 2021-03-23 DIAGNOSIS — F33.2 SEVERE EPISODE OF RECURRENT MAJOR DEPRESSIVE DISORDER, WITHOUT PSYCHOTIC FEATURES (HCC): ICD-10-CM

## 2021-03-23 DIAGNOSIS — Z23 TETANUS-DIPHTHERIA (TD) VACCINATION: ICD-10-CM

## 2021-03-23 PROCEDURE — 1111F DSCHRG MED/CURRENT MED MERGE: CPT | Performed by: FAMILY MEDICINE

## 2021-03-23 PROCEDURE — 99495 TRANSJ CARE MGMT MOD F2F 14D: CPT | Performed by: FAMILY MEDICINE

## 2021-03-23 PROCEDURE — 90715 TDAP VACCINE 7 YRS/> IM: CPT | Performed by: FAMILY MEDICINE

## 2021-03-23 PROCEDURE — 90471 IMMUNIZATION ADMIN: CPT | Performed by: FAMILY MEDICINE

## 2021-03-23 PROCEDURE — 3008F BODY MASS INDEX DOCD: CPT | Performed by: OBSTETRICS & GYNECOLOGY

## 2021-03-23 NOTE — ASSESSMENT & PLAN NOTE
She is on Zoloft 200 mg, Abilify 2 mg and BuSpar 10 mg twice a day and she will follow-up with the psychiatrist on March 31, 2021 this month, and she is stable on his medications

## 2021-04-08 ENCOUNTER — OFFICE VISIT (OUTPATIENT)
Dept: URGENT CARE | Facility: CLINIC | Age: 41
End: 2021-04-08
Payer: COMMERCIAL

## 2021-04-08 ENCOUNTER — TELEPHONE (OUTPATIENT)
Dept: FAMILY MEDICINE CLINIC | Facility: CLINIC | Age: 41
End: 2021-04-08

## 2021-04-08 VITALS
HEART RATE: 85 BPM | TEMPERATURE: 97.5 F | SYSTOLIC BLOOD PRESSURE: 134 MMHG | WEIGHT: 249 LBS | OXYGEN SATURATION: 98 % | RESPIRATION RATE: 18 BRPM | BODY MASS INDEX: 39.08 KG/M2 | DIASTOLIC BLOOD PRESSURE: 63 MMHG | HEIGHT: 67 IN

## 2021-04-08 DIAGNOSIS — M54.50 ACUTE BILATERAL LOW BACK PAIN WITHOUT SCIATICA: Primary | ICD-10-CM

## 2021-04-08 PROCEDURE — G0382 LEV 3 HOSP TYPE B ED VISIT: HCPCS | Performed by: NURSE PRACTITIONER

## 2021-04-08 PROCEDURE — 99283 EMERGENCY DEPT VISIT LOW MDM: CPT | Performed by: NURSE PRACTITIONER

## 2021-04-08 PROCEDURE — 99203 OFFICE O/P NEW LOW 30 MIN: CPT | Performed by: NURSE PRACTITIONER

## 2021-04-08 NOTE — PATIENT INSTRUCTIONS
Tylenol/Motrin as directed  Alternate ice and heat to lower back  Gentle stretches to lower back   Follow up with your PCP if pain is persistent or worsens  Back Pain   WHAT YOU NEED TO KNOW:   Back pain is common  It can be caused by many conditions, such as arthritis or the breakdown of spinal discs  Your risk for back pain is increased by injuries, lack of activity, or repeated bending and twisting  You may feel sore or stiff on one or both sides of your back  The pain may spread to your buttocks or thighs  DISCHARGE INSTRUCTIONS:   Return to the emergency department if:   · You have pain, numbness, or weakness in one or both legs  · Your pain becomes so severe that you cannot walk  · You cannot control your urine or bowel movements  · You have severe back pain with chest pain  · You have severe back pain, nausea, and vomiting  · You have severe back pain that spreads to your side or genital area  Contact your healthcare provider if:   · You have back pain that does not get better with rest and pain medicine  · You have a fever  · You have pain that worsens when you are on your back or when you rest     · You have pain that worsens when you cough or sneeze  · You lose weight without trying  · You have questions or concerns about your condition or care  Medicines:   · NSAIDs  help decrease swelling and pain  This medicine is available with or without a doctor's order  NSAIDs can cause stomach bleeding or kidney problems in certain people  If you take blood thinner medicine, always ask your healthcare provider if NSAIDs are safe for you  Always read the medicine label and follow directions  · Acetaminophen  decreases pain and fever  It is available without a doctor's order  Ask how much to take and how often to take it  Follow directions   Read the labels of all other medicines you are using to see if they also contain acetaminophen, or ask your doctor or pharmacist  Acetaminophen can cause liver damage if not taken correctly  Do not use more than 4 grams (4,000 milligrams) total of acetaminophen in one day  · Muscle relaxers  help decrease muscle spasms and back pain  · Prescription pain medicine  may be given  Ask your healthcare provider how to take this medicine safely  Some prescription pain medicines contain acetaminophen  Do not take other medicines that contain acetaminophen without talking to your healthcare provider  Too much acetaminophen may cause liver damage  Prescription pain medicine may cause constipation  Ask your healthcare provider how to prevent or treat constipation  · Take your medicine as directed  Contact your healthcare provider if you think your medicine is not helping or if you have side effects  Tell him or her if you are allergic to any medicine  Keep a list of the medicines, vitamins, and herbs you take  Include the amounts, and when and why you take them  Bring the list or the pill bottles to follow-up visits  Carry your medicine list with you in case of an emergency  How to manage your back pain:   · Apply ice  on your back for 15 to 20 minutes every hour or as directed  Use an ice pack, or put crushed ice in a plastic bag  Cover it with a towel before you apply it to your skin  Ice helps prevent tissue damage and decreases pain  · Apply heat  on your back for 20 to 30 minutes every 2 hours for as many days as directed  Heat helps decrease pain and muscle spasms  · Stay active  as much as you can without causing more pain  Bed rest could make your back pain worse  Avoid heavy lifting until your pain is gone  · Go to physical therapy as directed  A physical therapist can teach you exercises to help improve movement and strength, and to decrease pain  Follow up with your healthcare provider in 2 weeks, or as directed:  Write down your questions so you remember to ask them during your visits    © Copyright [employer] 2020 Information is for End User's use only and may not be sold, redistributed or otherwise used for commercial purposes  All illustrations and images included in CareNotes® are the copyrighted property of A D A M , Inc  or Madisyn Holliday  The above information is an  only  It is not intended as medical advice for individual conditions or treatments  Talk to your doctor, nurse or pharmacist before following any medical regimen to see if it is safe and effective for you

## 2021-04-08 NOTE — TELEPHONE ENCOUNTER
Frannie called and stated that she was experiencing a sudden onset of sharp pains in her lower back  She was advised to proceed to an Urgent Care and she agreed

## 2021-04-08 NOTE — PROGRESS NOTES
[facility]        NAME: Ayan Blanco is a 36 y o  female  : 1980    MRN: [id number]  DATE: 2021  TIME: 2:12 PM    Assessment and Plan   Acute bilateral low back pain without sciatica [M54 5]  1  Acute bilateral low back pain without sciatica           Patient Instructions   Tylenol/Motrin as directed  Alternate ice and heat to lower back  Gentle stretches to lower back   Follow up with your PCP if pain is persistent or worsens  Follow up with PCP in 3-5 days  Proceed to  ER if symptoms worsen  Chief Complaint     Chief Complaint   Patient presents with    Back Pain     lower back/buttocks pain x 4 5 days on and off sharp and tight, took motrin         History of Present Illness       Patient is a 36year old female presenting for lower back pain that started 4 days ago  She denies injury to the back  Pain is intermittent  She denies any ability to reproduce the pain however, states its worse when sitting or laying  Denies N/T/W  Denies bowel or bladder dysfunction  Denies urinary symptoms  She took ibuprofen this morning with some improvement  Review of Systems   Review of Systems   Constitutional: Negative for activity change, chills and fever  Respiratory: Negative for cough and shortness of breath  Gastrointestinal: Negative for abdominal pain, diarrhea, nausea and vomiting  Genitourinary: Negative for dysuria, frequency, hematuria and urgency  Musculoskeletal: Positive for back pain  Skin: Negative for rash  Neurological: Negative for weakness, numbness and headaches           Current Medications       Current Outpatient Medications:     ARIPiprazole (ABILIFY) 2 mg tablet, Take 1 tablet (2 mg total) by mouth daily At 9am, Disp: 30 tablet, Rfl: 1    busPIRone (BUSPAR) 10 mg tablet, Take 1 tablet (10 mg total) by mouth 2 (two) times a day At 9am and 6pm, Disp: 60 tablet, Rfl: 1    cholecalciferol (VITAMIN D3) 1,000 units tablet, Take 1,000 Units by mouth daily, Disp: , Rfl:     metFORMIN (GLUCOPHAGE) 500 mg tablet, take 1 tablet by mouth twice a day with meals, Disp: 180 tablet, Rfl: 1    omeprazole (PriLOSEC) 20 mg delayed release capsule, Take 20 mg by mouth daily, Disp: , Rfl:     sertraline (ZOLOFT) 100 mg tablet, Take 2 tablets (200 mg total) by mouth daily At 9am, Disp: 60 tablet, Rfl: 1    Blood Glucose Monitoring Suppl (ACCU-CHEK GUIDE) w/Device KIT, daily Use as directed, Disp: , Rfl: 0    Lancets (ACCU-CHEK SOFT TOUCH) lancets, Use as instructed once daily, Disp: 100 each, Rfl: 1    Current Allergies     Allergies as of 04/08/2021    (No Known Allergies)            The following portions of the patient's history were reviewed and updated as appropriate: allergies, current medications, past family history, past medical history, past social history, past surgical history and problem list      Past Medical History:   Diagnosis Date    Anemia     Anxiety     Depression     [facility]     [facility]ometrial cancer ([facility] ) 4/2/2020    Initial path: A  Uterus, Endometrium, Biopsy: - Consistent with atypical polypoid adenomyoma  See Note    B  Uterus, Cervical Mass, Excision: - Consistent with atypical polypoid adenomyoma  See Note    C  Uterine Cervical Mass, Excision: - Endometrioid adenocarcinoma, FIGO Grade I, 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   See Note    Generalized anxiety disorder 3/6/2021    GERD (gastroesophageal reflux disease)     History of transfusion 08/2019-3/14/20    Severe episode of recurrent major depressive disorder, without psychotic features [facility] ) 3/6/2021    Syncope     Due to blood loss    Uterine cancer ([facility] ) 2019       Past Surgical History:   Procedure Laterality Date    HYSTERECTOMY  2019    NO PAST SURGERIES      OOPHORECTOMY  2019    NH LAPAROSCOPY W TOT HYSTERECTUTERUS <=250 GRAM  W TUBE/OVARY N/A 4/16/2020    Procedure: ROBOTIC TOTAL LAPAROSCOPIC HYSTERECTOMY, BILATERAL SALPINGO-OOPHORECTOMY, SENTINEL LYMPH NODE BIOPSY;  Surgeon: Giovanna Garcia MD;  Location: [facility];  Service: Gynecology Oncology       Family History   Problem Relation Age of Onset    Hypertension Mother     No Known Problems Father     Lung cancer Maternal Grandfather     No Known Problems Brother     Cancer Maternal Aunt     No Known Problems Maternal Aunt     No Known Problems Maternal Aunt     No Known Problems Paternal Aunt          Medications have been verified  Objective   /63   Pulse 85   Temp 97 5 °F (36 4 °C)   Resp 18   Ht 5' 7" (1 702 m)   Wt 113 kg (249 lb)   LMP 03/02/2020   SpO2 98%   BMI 39 00 kg/m²        Physical Exam     Physical Exam  Vitals signs reviewed  Constitutional:       General: She is awake  She is not in acute distress  Appearance: Normal appearance  Cardiovascular:      Rate and Rhythm: Normal rate and regular rhythm  Heart sounds: Normal heart sounds, S1 normal and S2 normal    Pulmonary:      Effort: Pulmonary effort is normal       Breath sounds: Normal breath sounds  No decreased breath sounds, wheezing, rhonchi or rales  Musculoskeletal:        Back:    Skin:     General: Skin is warm and moist    Neurological:      General: No focal deficit present  Mental Status: She is alert, oriented to person, place, and time and easily aroused  Psychiatric:         Behavior: Behavior is cooperative

## 2021-05-24 ENCOUNTER — PATIENT OUTREACH (OUTPATIENT)
Dept: CASE MANAGEMENT | Facility: HOSPITAL | Age: 41
End: 2021-05-24

## 2021-05-24 ENCOUNTER — OFFICE VISIT (OUTPATIENT)
Dept: GYNECOLOGIC ONCOLOGY | Facility: CLINIC | Age: 41
End: 2021-05-24
Payer: COMMERCIAL

## 2021-05-24 VITALS
HEIGHT: 67 IN | DIASTOLIC BLOOD PRESSURE: 84 MMHG | BODY MASS INDEX: 37.83 KG/M2 | WEIGHT: 241 LBS | TEMPERATURE: 97.4 F | HEART RATE: 75 BPM | SYSTOLIC BLOOD PRESSURE: 112 MMHG | RESPIRATION RATE: 16 BRPM

## 2021-05-24 DIAGNOSIS — Z85.42 ENCOUNTER FOR FOLLOW-UP SURVEILLANCE OF ENDOMETRIAL CANCER: ICD-10-CM

## 2021-05-24 DIAGNOSIS — F33.2 SEVERE EPISODE OF RECURRENT MAJOR DEPRESSIVE DISORDER, WITHOUT PSYCHOTIC FEATURES (HCC): ICD-10-CM

## 2021-05-24 DIAGNOSIS — E89.40 SURGICAL MENOPAUSE: ICD-10-CM

## 2021-05-24 DIAGNOSIS — Z78.9 NEED FOR FOLLOW-UP BY SOCIAL WORKER: Primary | ICD-10-CM

## 2021-05-24 DIAGNOSIS — Z08 ENCOUNTER FOR FOLLOW-UP SURVEILLANCE OF ENDOMETRIAL CANCER: ICD-10-CM

## 2021-05-24 PROCEDURE — 3008F BODY MASS INDEX DOCD: CPT | Performed by: OBSTETRICS & GYNECOLOGY

## 2021-05-24 PROCEDURE — 99213 OFFICE O/P EST LOW 20 MIN: CPT | Performed by: OBSTETRICS & GYNECOLOGY

## 2021-05-24 NOTE — ASSESSMENT & PLAN NOTE
42yo with h/o stage IA endometrial adenocaricnoma, figo grade 1 here for surveillance visit    Pt without complaints     Clinically VARINDER, exam benign    Continue with [id number] visits or sooner with symptoms

## 2021-05-24 NOTE — PROGRESS NOTES
Pt returned call to [facility]  Pt stated she has had a higher than normal stress rating lately due to her employment  Pt stated her stress is not related to her medical diagnosis  Pt shared that she does have sessions with a counselor every other week and is being treated by a psychiatrist      Leland Harris encouraged pt to reach out if assistance is needed, pt agreed

## 2021-05-24 NOTE — ASSESSMENT & PLAN NOTE
We again discussed HRT and the possibility of mood swings/changes since surgical menopause  She is workign with her psychiatrist and declines hormone replacement therapy  Reports minimal other symptoms   Will continue to monitor and obtain early DEXA no

## 2021-05-24 NOTE — PROGRESS NOTES
Assessment/Plan:    Problem List Items Addressed This Visit        Other    Surgical menopause     We again discussed HRT and the possibility of mood swings/changes since surgical menopause  She is workign with her psychiatrist and declines hormone replacement therapy  Reports minimal other symptoms  Will continue to monitor and obtain early DEXA         Severe episode of recurrent major depressive disorder, without psychotic features [facility]     Stable on current regimen         Encounter for follow-up surveillance of endometrial cancer - Primary     41yo with h/o stage IA endometrial adenocaricnoma, figo grade 1 here for surveillance visit    Pt without complaints  Clinically VARINDER, exam benign    Continue with [id number] visits or sooner with symptoms                  CHIEF COMPLAINT: follow up       Problem:  Cancer Staging  Endometrial cancer ([place] [address] )  Staging form: Corpus Uteri - Carcinoma, AJCC 8th Edition  - Clinical: FIGO Stage IA (cN0(sn), cM0) - Signed by Kitty Chen MD on 5/4/2020        Previous therapy:  Oncology History   Endometrial cancer ([place] [address] )   3/13/2020 Initial Diagnosis    Endometrial cancer ([place] [address] ) on EMB     4/16/2020 Surgery    RA-tlh/bso/slnd     5/4/2020 -  Cancer Staged    Staging form: Corpus Uteri - Carcinoma, AJCC 8th Edition  - Clinical: FIGO Stage IA (cN0(sn), cM0) - Signed by Kitty Chen MD on 5/4/2020  Method of lymph node assessment: [place] lymph node biopsy  Histologic grade (G): G1  Histologic grading system: 3 grade system  National guidelines used in treatment planning: Yes  Type of national guideline used in treatment planning: NCCN             Patient ID: Daniel Landa is a 39 y o  female  44yo with DM2 and obesity here for surveillance of 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  Pt with recent admission for worsening depression/suicidal ideation  Her medications were adjusted and she continues to follow up with pscyhiatry   She denies vaginal dryness  Occasional hot flash  No pelvic pain/bleeding  No bloating  No changes in BM/urination       The following portions of the patient's history were reviewed and updated as appropriate: allergies, current medications, past family history, past medical history, past social history, past surgical history and problem list     Review of Systems   Constitutional: Negative for appetite change, chills, fatigue and fever  Respiratory: Negative for chest tightness and shortness of breath  Gastrointestinal: Negative for abdominal distention, abdominal pain, constipation, diarrhea and nausea  Genitourinary: Negative for difficulty urinating, flank pain, frequency, urgency, vaginal bleeding, vaginal discharge and vaginal pain  Musculoskeletal: Negative for back pain, joint swelling and myalgias  Skin: Negative for rash  Neurological: Negative for dizziness, light-headedness, numbness and headaches  Current Outpatient Medications   Medication Sig Dispense Refill    ARIPiprazole (ABILIFY) 2 mg tablet Take 1 tablet (2 mg total) by mouth daily At 9am 30 tablet 1    Blood Glucose Monitoring Suppl (ACCU-CHEK GUIDE) w/Device KIT daily Use as directed  0    busPIRone (BUSPAR) 10 mg tablet Take 1 tablet (10 mg total) by mouth 2 (two) times a day At 9am and 6pm 60 tablet 1    cholecalciferol (VITAMIN D3) 1,000 units tablet Take 1,000 Units by mouth daily      Lancets (ACCU-CHEK SOFT TOUCH) lancets Use as instructed once daily 100 each 1    metFORMIN (GLUCOPHAGE) 500 mg tablet take 1 tablet by mouth twice a day with meals 180 tablet 1    omeprazole (PriLOSEC) 20 mg delayed release capsule Take 20 mg by mouth daily      sertraline (ZOLOFT) 100 mg tablet Take 2 tablets (200 mg total) by mouth daily At 9am 60 tablet 1     No current facility-administered medications for this visit              Objective:    Blood pressure 112/84, pulse 75, temperature (!) 97 4 °F (36 3 °C), temperature source Tympanic, resp  rate 16, height 5' 7" (1 702 m), weight 109 kg (241 lb), last menstrual period 03/02/2020  Body mass index is 37 75 kg/m²  Body surface area is 2 19 meters squared  Physical Exam  HENT:      Head: Normocephalic and atraumatic  Neck:      Musculoskeletal: Normal range of motion  Cardiovascular:      Rate and Rhythm: Normal rate and regular rhythm  Pulmonary:      Effort: Pulmonary effort is normal    Abdominal:      General: There is no distension  Palpations: Abdomen is soft  There is no mass  Genitourinary:     Comments: The external female genitalia is normal  The bartholin's, uretheral and skenes glands are normal  The urethral meatus is normal (midline with no lesions)  Anus without fissure or lesion  Speculum exam reveals a grossly normal vagina cuff  No masses, lesions,discharge or bleeding  No significant cystocele or rectocele noted  Bimanual exam notes a surgical absent cervix, uterus and adnexal structures  No masses or fullness  Bladder is without fullness, mass or tenderness  Musculoskeletal: Normal range of motion  Skin:     General: Skin is warm and dry  Neurological:      Mental Status: She is alert and oriented to person, place, and time             Lab Results   Component Value Date    K 3 6 03/06/2021     03/06/2021    CO2 28 03/06/2021    BUN 21 03/06/2021    CREATININE 0 75 03/06/2021    GLUF 79 04/06/2020    CALCIUM 9 2 03/06/2021    AST 28 03/06/2021    ALT 76 03/06/2021    ALKPHOS 70 03/06/2021    EGFR 100 03/06/2021     Lab Results   Component Value Date    WBC 6 16 03/06/2021    HGB 15 8 (H) 03/06/2021    HCT 49 1 (H) 03/06/2021    MCV 91 03/06/2021     03/06/2021     Lab Results   Component Value Date    NEUTROABS 3 92 03/06/2021        Trend:  No results found for:

## 2021-05-24 NOTE — PROGRESS NOTES
LSW received DT and problem list via referral process  Pt self scored 6/10 and noted concerns with work/school, fears, nervousness, worry, loss of interest in normal activities and tingling in extremities  LSW attempted to contact pt via telephone, no answer  LSW left message requesting a call back  LSW will attempt 2nd outreach in a few day

## 2021-07-26 LAB
ALBUMIN SERPL-MCNC: 4.5 G/DL (ref 3.6–5.1)
ALBUMIN/GLOB SERPL: 1.7 (CALC) (ref 1–2.5)
ALP SERPL-CCNC: 59 U/L (ref 31–125)
ALT SERPL-CCNC: 24 U/L (ref 6–29)
AST SERPL-CCNC: 14 U/L (ref 10–30)
BASOPHILS # BLD AUTO: 32 CELLS/UL (ref 0–200)
BASOPHILS NFR BLD AUTO: 0.6 %
BILIRUB SERPL-MCNC: 0.4 MG/DL (ref 0.2–1.2)
BUN SERPL-MCNC: 22 MG/DL (ref 7–25)
BUN/CREAT SERPL: NORMAL (CALC) (ref 6–22)
CALCIUM SERPL-MCNC: 9.7 MG/DL (ref 8.6–10.2)
CHLORIDE SERPL-SCNC: 104 MMOL/L (ref 98–110)
CHOLEST SERPL-MCNC: 207 MG/DL
CHOLEST/HDLC SERPL: 5.8 (CALC)
CO2 SERPL-SCNC: 30 MMOL/L (ref 20–32)
CREAT SERPL-MCNC: 0.69 MG/DL (ref 0.5–1.1)
EOSINOPHIL # BLD AUTO: 154 CELLS/UL (ref 15–500)
EOSINOPHIL NFR BLD AUTO: 2.9 %
ERYTHROCYTE [DISTWIDTH] IN BLOOD BY AUTOMATED COUNT: 13.2 % (ref 11–15)
GLOBULIN SER CALC-MCNC: 2.7 G/DL (CALC) (ref 1.9–3.7)
GLUCOSE SERPL-MCNC: 96 MG/DL (ref 65–99)
HBA1C MFR BLD: 5.4 % OF TOTAL HGB
HCT VFR BLD AUTO: 44.6 % (ref 35–45)
HDLC SERPL-MCNC: 36 MG/DL
HGB BLD-MCNC: 15 G/DL (ref 11.7–15.5)
LDLC SERPL CALC-MCNC: 142 MG/DL (CALC)
LYMPHOCYTES # BLD AUTO: 1664 CELLS/UL (ref 850–3900)
LYMPHOCYTES NFR BLD AUTO: 31.4 %
MCH RBC QN AUTO: 29 PG (ref 27–33)
MCHC RBC AUTO-ENTMCNC: 33.6 G/DL (ref 32–36)
MCV RBC AUTO: 86.3 FL (ref 80–100)
MONOCYTES # BLD AUTO: 334 CELLS/UL (ref 200–950)
MONOCYTES NFR BLD AUTO: 6.3 %
NEUTROPHILS # BLD AUTO: 3116 CELLS/UL (ref 1500–7800)
NEUTROPHILS NFR BLD AUTO: 58.8 %
NONHDLC SERPL-MCNC: 171 MG/DL (CALC)
PLATELET # BLD AUTO: 199 THOUSAND/UL (ref 140–400)
PMV BLD REES-ECKER: 10.3 FL (ref 7.5–12.5)
POTASSIUM SERPL-SCNC: 4.2 MMOL/L (ref 3.5–5.3)
PROT SERPL-MCNC: 7.2 G/DL (ref 6.1–8.1)
RBC # BLD AUTO: 5.17 MILLION/UL (ref 3.8–5.1)
SL AMB EGFR AFRICAN AMERICAN: 125 ML/MIN/1.73M2
SL AMB EGFR NON AFRICAN AMERICAN: 108 ML/MIN/1.73M2
SODIUM SERPL-SCNC: 141 MMOL/L (ref 135–146)
TRIGL SERPL-MCNC: 158 MG/DL
WBC # BLD AUTO: 5.3 THOUSAND/UL (ref 3.8–10.8)

## 2021-07-26 PROCEDURE — 3044F HG A1C LEVEL LT 7.0%: CPT | Performed by: FAMILY MEDICINE

## 2021-07-27 ENCOUNTER — OFFICE VISIT (OUTPATIENT)
Dept: FAMILY MEDICINE CLINIC | Facility: CLINIC | Age: 41
End: 2021-07-27
Payer: COMMERCIAL

## 2021-07-27 ENCOUNTER — TELEPHONE (OUTPATIENT)
Dept: FAMILY MEDICINE CLINIC | Facility: CLINIC | Age: 41
End: 2021-07-27

## 2021-07-27 VITALS
SYSTOLIC BLOOD PRESSURE: 120 MMHG | DIASTOLIC BLOOD PRESSURE: 88 MMHG | HEIGHT: 67 IN | HEART RATE: 73 BPM | WEIGHT: 238 LBS | BODY MASS INDEX: 37.35 KG/M2 | RESPIRATION RATE: 16 BRPM | OXYGEN SATURATION: 98 % | TEMPERATURE: 97.5 F

## 2021-07-27 DIAGNOSIS — E78.2 MIXED HYPERLIPIDEMIA: ICD-10-CM

## 2021-07-27 DIAGNOSIS — F33.2 SEVERE EPISODE OF RECURRENT MAJOR DEPRESSIVE DISORDER, WITHOUT PSYCHOTIC FEATURES (HCC): ICD-10-CM

## 2021-07-27 DIAGNOSIS — E11.9 TYPE 2 DIABETES MELLITUS WITHOUT COMPLICATION, WITHOUT LONG-TERM CURRENT USE OF INSULIN (HCC): Primary | ICD-10-CM

## 2021-07-27 PROBLEM — D50.0 IRON DEFICIENCY ANEMIA DUE TO CHRONIC BLOOD LOSS: Status: RESOLVED | Noted: 2019-09-12 | Resolved: 2021-07-27

## 2021-07-27 PROCEDURE — 3008F BODY MASS INDEX DOCD: CPT | Performed by: FAMILY MEDICINE

## 2021-07-27 PROCEDURE — 99214 OFFICE O/P EST MOD 30 MIN: CPT | Performed by: FAMILY MEDICINE

## 2021-07-27 PROCEDURE — 1036F TOBACCO NON-USER: CPT | Performed by: FAMILY MEDICINE

## 2021-07-27 PROCEDURE — 3725F SCREEN DEPRESSION PERFORMED: CPT | Performed by: FAMILY MEDICINE

## 2021-07-27 NOTE — ASSESSMENT & PLAN NOTE
Lab Results   Component Value Date    HGBA1C 5 4 07/26/2021   cont same dose metformin   Recheck labs in 4 month

## 2021-07-27 NOTE — PROGRESS NOTES
Assessment/Plan:    Problem List Items Addressed This Visit        Endocrine    Type 2 diabetes mellitus without complication, without long-term current use of insulin ([facility] [address] ) - Primary       Lab Results   Component Value Date    HGBA1C 5 4 07/26/2021   cont same dose metformin   Recheck labs in 4 month          Relevant Orders    Ambulatory referral to Gynecology    Comprehensive metabolic panel    Hemoglobin A1C    Lipid panel      Other Visit Diagnoses     Mixed hyperlipidemia        Relevant Orders    Lipid panel  Lab Results   Component Value Date    LDLCALC 142 (H) 07/26/2021    discussed with her to cut down the  Fatty food and will recheck her labs in 4 months to see if she improving her cholesterol         discussed with the keep checking her blood sugar at least once  Daily and she will report if she has any hypoglycemia    labs and follow-up in 4 months  Depression Screening Follow-up Plan: Patient's depression screening was positive with a PHQ-2 score of 0  Their PHQ-9 score was 0  Patient declines further evaluation by mental health professional and/or medications  They have no active suicidal ideations  Brief counseling provided and recommend additional follow-up/re-evaluation at next office visit  Chief Complaint   Patient presents with    Follow-up       Subjective:   Patient ID: Fabiola Henry is a 39 y o  female  She is here follow-up on diabetes, she says she has been taking metformin twice a day, she has been doing good with her diet and she lost about 69 lb within 1 year  She also follows with psychiatrist and depression under control she takes all her medication regularly,  She does regular walking at least 30 minute    Review of Systems   Constitutional: Negative for activity change, appetite change, chills, fatigue, fever and unexpected weight change     HENT: Negative for congestion, ear discharge, ear pain, nosebleeds, postnasal drip, rhinorrhea, sinus pressure, sneezing, sore throat, trouble swallowing and voice change  Eyes: Negative for photophobia, pain, discharge, redness and itching  Respiratory: Negative for cough, chest tightness, shortness of breath and wheezing  Cardiovascular: Negative for chest pain, palpitations and leg swelling  Gastrointestinal: Negative for abdominal pain, constipation, diarrhea, nausea and vomiting  Endocrine: Negative for polyuria  Genitourinary: Negative for dysuria, frequency and urgency  Musculoskeletal: Negative for arthralgias, back pain, myalgias and neck pain  Skin: Negative for color change, pallor and rash  Allergic/Immunologic: Negative for environmental allergies and food allergies  Neurological: Negative for dizziness, weakness, light-headedness and headaches  Hematological: Negative for adenopathy  Does not bruise/bleed easily  Psychiatric/Behavioral: Negative for behavioral problems  The patient is not nervous/anxious  Objective:  Physical Exam  Vitals and nursing note reviewed  Constitutional:       Appearance: She is well-developed  HENT:      Head: Normocephalic and atraumatic  Right Ear: External ear normal       Left Ear: External ear normal       Nose: Nose normal       Mouth/Throat:      Pharynx: No oropharyngeal exudate  Eyes:      General: No scleral icterus  Right eye: No discharge  Left eye: No discharge  Conjunctiva/sclera: Conjunctivae normal       Pupils: Pupils are equal, round, and reactive to light  Neck:      Thyroid: No thyromegaly  Trachea: No tracheal deviation  Cardiovascular:      Rate and Rhythm: Normal rate and regular rhythm  Heart sounds: Normal heart sounds  No murmur heard  Pulmonary:      Effort: Pulmonary effort is normal  No respiratory distress  Breath sounds: Normal breath sounds  No wheezing or rales  Abdominal:      General: Bowel sounds are normal  There is no distension  Palpations: Abdomen is soft  There is no mass  Tenderness: There is no abdominal tenderness  There is no rebound  Musculoskeletal:         General: Normal range of motion  Cervical back: Normal range of motion and neck supple  Lymphadenopathy:      Cervical: No cervical adenopathy  Skin:     General: Skin is warm  Coloration: Skin is not pale  Findings: No erythema or rash  Neurological:      Mental Status: She is alert and oriented to person, place, and time  Cranial Nerves: No cranial nerve deficit  Deep Tendon Reflexes: Reflexes are normal and symmetric  Psychiatric:         Behavior: Behavior normal          Thought Content:  Thought content normal          Judgment: Judgment normal             Past Surgical History:   Procedure Laterality Date    HYSTERECTOMY  2019    NO PAST SURGERIES      OOPHORECTOMY  2019    MI LAPAROSCOPY W TOT HYSTERECTUTERUS <=250 GRAM  W TUBE/OVARY N/A 4/16/2020    Procedure: ROBOTIC TOTAL LAPAROSCOPIC HYSTERECTOMY, BILATERAL SALPINGO-OOPHORECTOMY, SENTINEL LYMPH NODE BIOPSY;  Surgeon: Prem Ambrose MD;  Location: [facility];  Service: Gynecology Oncology       Family History   Problem Relation Age of Onset    Hypertension Mother     No Known Problems Father     Lung cancer Maternal Grandfather     No Known Problems Brother     Cancer Maternal Aunt     No Known Problems Maternal Aunt     No Known Problems Maternal Aunt     No Known Problems Paternal Aunt          Current Outpatient Medications:     ARIPiprazole (ABILIFY) 2 mg tablet, Take 1 tablet (2 mg total) by mouth daily At 9am, Disp: 30 tablet, Rfl: 1    Blood Glucose Monitoring Suppl (ACCU-CHEK GUIDE) w/Device KIT, daily Use as directed, Disp: , Rfl: 0    busPIRone (BUSPAR) 10 mg tablet, Take 1 tablet (10 mg total) by mouth 2 (two) times a day At 9am and 6pm, Disp: 60 tablet, Rfl: 1    cholecalciferol (VITAMIN D3) 1,000 units tablet, Take 1,000 Units by mouth daily, Disp: , Rfl:     Lancets (ACCU-CHEK SOFT TOUCH) lancets, Use as instructed once daily, Disp: 100 each, Rfl: 1    metFORMIN (GLUCOPHAGE) 500 mg tablet, take 1 tablet by mouth twice a day with meals, Disp: 180 tablet, Rfl: 1    omeprazole (PriLOSEC) 20 mg delayed release capsule, Take 20 mg by mouth daily, Disp: , Rfl:     sertraline (ZOLOFT) 100 mg tablet, Take 2 tablets (200 mg total) by mouth daily At 9am, Disp: 60 tablet, Rfl: 1    No Known Allergies    Vitals:    07/27/21 1818   BP: 120/88   BP Location: Right arm   Patient Position: Sitting   Cuff Size: Large   Pulse: 73   Resp: 16   Temp: 97 5 °F (36 4 °C)   TempSrc: Tympanic   SpO2: 98%   Weight: 108 kg (238 lb)   Height: 5' 7" (1 702 m)

## 2021-07-27 NOTE — TELEPHONE ENCOUNTER
----- Message from Gerson Benson MD sent at 7/27/2021  8:01 AM EDT -----  Patient needs follow-up appointment, has  Abnormal blood work

## 2021-08-17 NOTE — PROGRESS NOTES
Assessment/Plan:    Problem List Items Addressed This Visit        Other    Surgical menopause     Patient continues to decline hormone replacement therapy  We discussed increasing her calcium and vitamin-D supplementation  This was called into pharmacy  Continue to monitor for worsening symptoms and plan for early DEXA  Relevant Medications    calcium carbonate-vitamin D (OSCAL-D) 500 mg-200 units per tablet    Encounter for follow-up surveillance of endometrial cancer - Primary     41yo with h/o stage IA endometrial adenocaricnoma, figo grade 1 here for surveillance visit    Clinically remains VARINDER  Will continue with Q 6 month surveillance visits or sooner with symptoms  CHIEF COMPLAINT: follow up       Problem:  Cancer Staging  Endometrial cancer ([place] [address] )  Staging form: Corpus Uteri - Carcinoma, AJCC 8th Edition  - Clinical: FIGO Stage IA (cN0(sn), cM0) - Signed by Maxine Hoyos MD on 5/4/2020        Previous therapy:  Oncology History   Endometrial cancer ([place] [address] )   3/13/2020 Initial Diagnosis    Endometrial cancer ([place] [address] ) on EMB     4/16/2020 Surgery    RA-tlh/bso/slnd     5/4/2020 -  Cancer Staged    Staging form: Corpus Uteri - Carcinoma, AJCC 8th Edition  - Clinical: FIGO Stage IA (cN0(sn), cM0) - Signed by Maxine Hoyos MD on 5/4/2020  Method of lymph node assessment: [place] lymph node biopsy  Histologic grade (G): G1  Histologic grading system: 3 grade system  National guidelines used in treatment planning: Yes  Type of national guideline used in treatment planning: NCCN             Patient ID: Chaka Silva is a 39 y o  female  42yo with DM2 and obesity here for surveillance of 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  Patient has no complaints  She denies any vaginal bleeding or discharge  She denies any changes in bowel movements or urination  She has no abdominal bloating or pain  She has occasional hot flashes that are manageable    She reports that her mood is currently stable  The following portions of the patient's history were reviewed and updated as appropriate: allergies, current medications, past family history, past medical history, past social history, past surgical history and problem list     Review of Systems   Constitutional: Negative for appetite change, chills, fatigue and fever  Respiratory: Negative for chest tightness and shortness of breath  Gastrointestinal: Negative for abdominal distention, abdominal pain, constipation, diarrhea and nausea  Genitourinary: Negative for difficulty urinating, flank pain, frequency, urgency, vaginal bleeding, vaginal discharge and vaginal pain  Musculoskeletal: Negative for back pain, joint swelling and myalgias  Skin: Negative for rash  Neurological: Negative for dizziness, light-headedness, numbness and headaches  Current Outpatient Medications   Medication Sig Dispense Refill    ARIPiprazole (ABILIFY) 2 mg tablet Take 1 tablet (2 mg total) by mouth daily At 9am 30 tablet 1    Blood Glucose Monitoring Suppl (ACCU-CHEK GUIDE) w/Device KIT daily Use as directed  0    busPIRone (BUSPAR) 10 mg tablet Take 1 tablet (10 mg total) by mouth 2 (two) times a day At 9am and 6pm 60 tablet 1    cholecalciferol (VITAMIN D3) 1,000 units tablet Take 1,000 Units by mouth daily      Lancets (ACCU-CHEK SOFT TOUCH) lancets Use as instructed once daily 100 each 1    metFORMIN (GLUCOPHAGE) 500 mg tablet take 1 tablet by mouth twice a day with meals 180 tablet 1    omeprazole (PriLOSEC) 20 mg delayed release capsule Take 20 mg by mouth daily      sertraline (ZOLOFT) 100 mg tablet Take 2 tablets (200 mg total) by mouth daily At 9am 60 tablet 1     No current facility-administered medications for this visit  Objective:    Last menstrual period 03/02/2020  There is no height or weight on file to calculate BMI  There is no height or weight on file to calculate BSA      Physical Exam  HENT:      Head: Normocephalic and atraumatic  Cardiovascular:      Rate and Rhythm: Normal rate and regular rhythm  Pulmonary:      Effort: Pulmonary effort is normal    Abdominal:      General: There is no distension  Palpations: Abdomen is soft  There is no mass  Genitourinary:     Comments: The external female genitalia is normal  The bartholin's, uretheral and skenes glands are normal  The urethral meatus is normal (midline with no lesions)  Anus without fissure or lesion  Speculum exam reveals a grossly normal vagina cuff  No masses, lesions,discharge or bleeding  No significant cystocele or rectocele noted  Bimanual exam notes a surgical absent cervix, uterus and adnexal structures  No masses or fullness  Bladder is without fullness, mass or tenderness  Musculoskeletal:         General: Normal range of motion  Cervical back: Normal range of motion  Skin:     General: Skin is warm and dry  Neurological:      Mental Status: She is alert and oriented to person, place, and time             Lab Results   Component Value Date    K 4 2 07/26/2021     07/26/2021    CO2 30 07/26/2021    BUN 22 07/26/2021    CREATININE 0 69 07/26/2021    GLUF 79 04/06/2020    CALCIUM 9 7 07/26/2021    AST 14 07/26/2021    ALT 24 07/26/2021    ALKPHOS 59 07/26/2021    EGFR 100 03/06/2021     Lab Results   Component Value Date    WBC 5 3 07/26/2021    HGB 15 0 07/26/2021    HCT 44 6 07/26/2021    MCV 86 3 07/26/2021     07/26/2021     Lab Results   Component Value Date    NEUTROABS 3,116 07/26/2021        Trend:  No results found for:

## 2021-08-23 ENCOUNTER — OFFICE VISIT (OUTPATIENT)
Dept: GYNECOLOGIC ONCOLOGY | Facility: CLINIC | Age: 41
End: 2021-08-23
Payer: COMMERCIAL

## 2021-08-23 VITALS
BODY MASS INDEX: 37.2 KG/M2 | HEART RATE: 70 BPM | HEIGHT: 67 IN | WEIGHT: 237 LBS | OXYGEN SATURATION: 97 % | TEMPERATURE: 97.7 F | RESPIRATION RATE: 16 BRPM | SYSTOLIC BLOOD PRESSURE: 128 MMHG | DIASTOLIC BLOOD PRESSURE: 74 MMHG

## 2021-08-23 DIAGNOSIS — E89.40 SURGICAL MENOPAUSE: ICD-10-CM

## 2021-08-23 DIAGNOSIS — Z85.42 ENCOUNTER FOR FOLLOW-UP SURVEILLANCE OF ENDOMETRIAL CANCER: Primary | ICD-10-CM

## 2021-08-23 DIAGNOSIS — Z08 ENCOUNTER FOR FOLLOW-UP SURVEILLANCE OF ENDOMETRIAL CANCER: Primary | ICD-10-CM

## 2021-08-23 PROCEDURE — 3078F DIAST BP <80 MM HG: CPT | Performed by: OBSTETRICS & GYNECOLOGY

## 2021-08-23 PROCEDURE — 3074F SYST BP LT 130 MM HG: CPT | Performed by: OBSTETRICS & GYNECOLOGY

## 2021-08-23 PROCEDURE — 99213 OFFICE O/P EST LOW 20 MIN: CPT | Performed by: OBSTETRICS & GYNECOLOGY

## 2021-08-23 RX ORDER — B-COMPLEX WITH VITAMIN C
2 TABLET ORAL
Qty: 180 TABLET | Refills: 3 | Status: SHIPPED | OUTPATIENT
Start: 2021-08-23

## 2021-08-23 NOTE — ASSESSMENT & PLAN NOTE
42yo with h/o stage IA endometrial adenocaricnoma, figo grade 1 here for surveillance visit    Clinically remains VARINDER  Will continue with Q 6 month surveillance visits or sooner with symptoms 
Patient continues to decline hormone replacement therapy  We discussed increasing her calcium and vitamin-D supplementation  This was called into pharmacy  Continue to monitor for worsening symptoms and plan for early DEXA 
Parker Mandel(Attending)

## 2021-08-23 NOTE — PATIENT INSTRUCTIONS
D/w pt vitamin D/Calcium supplementation given surgical menopause  - Postmenopausal women should consume 1200 mg (total diet plus supplement) of calcium and at least 800IU (20 micrograms) of vitamin D per day

## 2021-08-28 DIAGNOSIS — E11.9 TYPE 2 DIABETES MELLITUS WITHOUT COMPLICATION, WITHOUT LONG-TERM CURRENT USE OF INSULIN (HCC): ICD-10-CM

## 2021-10-28 LAB
ALBUMIN SERPL-MCNC: 4.6 G/DL (ref 3.6–5.1)
ALBUMIN/GLOB SERPL: 1.8 (CALC) (ref 1–2.5)
ALP SERPL-CCNC: 65 U/L (ref 31–125)
ALT SERPL-CCNC: 24 U/L (ref 6–29)
AST SERPL-CCNC: 14 U/L (ref 10–30)
BILIRUB SERPL-MCNC: 0.3 MG/DL (ref 0.2–1.2)
BUN SERPL-MCNC: 31 MG/DL (ref 7–25)
BUN/CREAT SERPL: 40 (CALC) (ref 6–22)
CALCIUM SERPL-MCNC: 10.2 MG/DL (ref 8.6–10.2)
CHLORIDE SERPL-SCNC: 106 MMOL/L (ref 98–110)
CHOLEST SERPL-MCNC: 182 MG/DL
CHOLEST/HDLC SERPL: 4.9 (CALC)
CO2 SERPL-SCNC: 30 MMOL/L (ref 20–32)
CREAT SERPL-MCNC: 0.78 MG/DL (ref 0.5–1.1)
GLOBULIN SER CALC-MCNC: 2.6 G/DL (CALC) (ref 1.9–3.7)
GLUCOSE SERPL-MCNC: 95 MG/DL (ref 65–99)
HBA1C MFR BLD: 5.2 % OF TOTAL HGB
HDLC SERPL-MCNC: 37 MG/DL
LDLC SERPL CALC-MCNC: 123 MG/DL (CALC)
NONHDLC SERPL-MCNC: 145 MG/DL (CALC)
POTASSIUM SERPL-SCNC: 4.4 MMOL/L (ref 3.5–5.3)
PROT SERPL-MCNC: 7.2 G/DL (ref 6.1–8.1)
SL AMB EGFR AFRICAN AMERICAN: 109 ML/MIN/1.73M2
SL AMB EGFR NON AFRICAN AMERICAN: 94 ML/MIN/1.73M2
SODIUM SERPL-SCNC: 142 MMOL/L (ref 135–146)
TRIGL SERPL-MCNC: 112 MG/DL

## 2021-12-09 ENCOUNTER — OFFICE VISIT (OUTPATIENT)
Dept: FAMILY MEDICINE CLINIC | Facility: CLINIC | Age: 41
End: 2021-12-09
Payer: COMMERCIAL

## 2021-12-09 VITALS
WEIGHT: 223 LBS | BODY MASS INDEX: 35 KG/M2 | OXYGEN SATURATION: 99 % | RESPIRATION RATE: 16 BRPM | DIASTOLIC BLOOD PRESSURE: 70 MMHG | HEIGHT: 67 IN | HEART RATE: 71 BPM | SYSTOLIC BLOOD PRESSURE: 110 MMHG

## 2021-12-09 DIAGNOSIS — E11.9 TYPE 2 DIABETES MELLITUS WITHOUT COMPLICATION, WITHOUT LONG-TERM CURRENT USE OF INSULIN (HCC): Primary | ICD-10-CM

## 2021-12-09 DIAGNOSIS — E78.2 MIXED HYPERLIPIDEMIA: ICD-10-CM

## 2021-12-09 DIAGNOSIS — F32.5 MAJOR DEPRESSIVE DISORDER IN FULL REMISSION, UNSPECIFIED WHETHER RECURRENT (HCC): ICD-10-CM

## 2021-12-09 DIAGNOSIS — Z12.31 ENCOUNTER FOR SCREENING MAMMOGRAM FOR BREAST CANCER: ICD-10-CM

## 2021-12-09 PROBLEM — D50.0 IRON DEFICIENCY ANEMIA DUE TO CHRONIC BLOOD LOSS: Status: RESOLVED | Noted: 2020-03-11 | Resolved: 2021-12-09

## 2021-12-09 PROBLEM — K62.89 RECTAL PAIN: Status: RESOLVED | Noted: 2019-12-26 | Resolved: 2021-12-09

## 2021-12-09 PROCEDURE — 3074F SYST BP LT 130 MM HG: CPT | Performed by: FAMILY MEDICINE

## 2021-12-09 PROCEDURE — 3078F DIAST BP <80 MM HG: CPT | Performed by: FAMILY MEDICINE

## 2021-12-09 PROCEDURE — 99214 OFFICE O/P EST MOD 30 MIN: CPT | Performed by: FAMILY MEDICINE

## 2022-01-12 ENCOUNTER — TELEPHONE (OUTPATIENT)
Dept: GYNECOLOGIC ONCOLOGY | Facility: HOSPITAL | Age: 42
End: 2022-01-12

## 2022-01-12 ENCOUNTER — TELEPHONE (OUTPATIENT)
Dept: HEMATOLOGY ONCOLOGY | Facility: CLINIC | Age: 42
End: 2022-01-12

## 2022-01-12 NOTE — TELEPHONE ENCOUNTER
Reschedule Appointment     Who is calling in Patient    Doctor Appointment Scheduled with Kulwinder Herrera date and time 2- @ 9:15am   New date and time 2-21-22 @ 9:15am    [place] [id number]    Patient verbalized understanding

## 2022-02-12 ENCOUNTER — HOSPITAL ENCOUNTER (OUTPATIENT)
Dept: MAMMOGRAPHY | Facility: HOSPITAL | Age: 42
Discharge: HOME | End: 2022-02-12
Attending: FAMILY MEDICINE
Payer: COMMERCIAL

## 2022-02-12 DIAGNOSIS — Z12.31 ENCOUNTER FOR SCREENING MAMMOGRAM FOR MALIGNANT NEOPLASM OF BREAST: ICD-10-CM

## 2022-02-12 PROCEDURE — 77067 SCR MAMMO BI INCL CAD: CPT

## 2022-02-12 PROCEDURE — 77063 BREAST TOMOSYNTHESIS BI: CPT

## 2022-02-18 NOTE — ASSESSMENT & PLAN NOTE
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

## 2022-02-18 NOTE — PROGRESS NOTES
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  Clinically VARINDER  Exam benign    Continue with q6 month surveillance exams  Readdressed signs and symptoms of concern  CHIEF COMPLAINT: follow up       Problem:  Cancer Staging  Endometrial cancer ([facility] [address] )  Staging form: Corpus Uteri - Carcinoma, AJCC 8th Edition  - Clinical: FIGO Stage IA (cN0(sn), cM0) - Signed by Saeed Perkins MD on 5/4/2020        Previous therapy:  Oncology History   Endometrial cancer ([facility] [address] )   3/13/2020 Initial Diagnosis    Endometrial cancer ([facility] [address] ) on EMB     4/16/2020 Surgery    RA-tlh/bso/slnd     5/4/2020 -  Cancer Staged    Staging form: Corpus Uteri - Carcinoma, AJCC 8th Edition  - Clinical: FIGO Stage IA (cN0(sn), cM0) - Signed by Saeed Perkins MD on 5/4/2020  Method of lymph node assessment: [place] lymph node biopsy  Histologic grade (G): G1  Histologic grading system: 3 grade system  National guidelines used in treatment planning: Yes  Type of national guideline used in treatment planning: NCCN             Patient ID: Damir Doty is a 39 y o  female  43yo with DM2 and obesity here for surveillance of 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  Pt denies abdominal bloating/pain/cramping  No vaginal bleeding/discharge  No changes in BM/urination  No early satiety/Appetite adequate  Denies significant weight loss/weight gain           The following portions of the patient's history were reviewed and updated as appropriate: allergies, current medications, past family history, past medical history, past social history, past surgical history and problem list     Review of Systems Constitutional: Negative for appetite change, chills, fatigue and fever  Respiratory: Negative for chest tightness and shortness of breath  Gastrointestinal: Negative for abdominal distention, abdominal pain, constipation, diarrhea and nausea  Genitourinary: Negative for difficulty urinating, flank pain, frequency, urgency, vaginal bleeding, vaginal discharge and vaginal pain  Musculoskeletal: Negative for back pain, joint swelling and myalgias  Skin: Negative for rash  Neurological: Negative for dizziness, light-headedness, numbness and headaches  Current Outpatient Medications   Medication Sig Dispense Refill    ARIPiprazole (ABILIFY) 2 mg tablet Take 1 tablet (2 mg total) by mouth daily At 9am 30 tablet 1    Blood Glucose Monitoring Suppl (ACCU-CHEK GUIDE) w/Device KIT daily Use as directed  0    busPIRone (BUSPAR) 10 mg tablet Take 1 tablet (10 mg total) by mouth 2 (two) times a day At 9am and 6pm 60 tablet 1    Calcium Carb-Cholecalciferol ([place] Calcium w/D) 500-200 MG-UNIT TABS No take 2 tablets by mouth once daily with breakfast      calcium carbonate-vitamin D (OSCAL-D) 500 mg-200 units per tablet Take 2 tablets by mouth daily with breakfast 180 tablet 3    cholecalciferol (VITAMIN D3) 1,000 units tablet Take 1,000 Units by mouth daily      Lancets (ACCU-CHEK SOFT TOUCH) lancets Use as instructed once daily 100 each 1    metFORMIN (GLUCOPHAGE) 500 mg tablet take 1 tablet by mouth twice a day with meals 180 tablet 1    sertraline (ZOLOFT) 100 mg tablet Take 2 tablets (200 mg total) by mouth daily At 9am 60 tablet 1     No current facility-administered medications for this visit  Objective:    Last menstrual period 03/02/2020  There is no height or weight on file to calculate BMI  There is no height or weight on file to calculate BSA  Physical Exam  HENT:      Head: Normocephalic and atraumatic     Cardiovascular:      Rate and Rhythm: Normal rate and regular rhythm  Pulmonary:      Effort: Pulmonary effort is normal    Abdominal:      General: There is no distension  Palpations: Abdomen is soft  There is no mass  Genitourinary:     Comments: The external female genitalia is normal  The bartholin's, uretheral and skenes glands are normal  The urethral meatus is normal (midline with no lesions)  Anus without fissure or lesion  Speculum exam reveals a grossly normal vagina cuff  No masses, lesions,discharge or bleeding  No significant cystocele or rectocele noted  Bimanual exam notes a surgical absent cervix, uterus and adnexal structures  No masses or fullness  Bladder is without fullness, mass or tenderness  Musculoskeletal:         General: Normal range of motion  Cervical back: Normal range of motion  Skin:     General: Skin is warm and dry  Neurological:      Mental Status: She is alert and oriented to person, place, and time             Lab Results   Component Value Date    K 4 4 10/28/2021     10/28/2021    CO2 30 10/28/2021    BUN 31 (H) 10/28/2021    CREATININE 0 78 10/28/2021    GLUF 79 04/06/2020    CALCIUM 10 2 10/28/2021    AST 14 10/28/2021    ALT 24 10/28/2021    ALKPHOS 65 10/28/2021    EGFR 100 03/06/2021     Lab Results   Component Value Date    WBC 5 3 07/26/2021    HGB 15 0 07/26/2021    HCT 44 6 07/26/2021    MCV 86 3 07/26/2021     07/26/2021     Lab Results   Component Value Date    NEUTROABS 3,116 07/26/2021        Trend:  No results found for:

## 2022-02-21 ENCOUNTER — OFFICE VISIT (OUTPATIENT)
Dept: GYNECOLOGIC ONCOLOGY | Facility: CLINIC | Age: 42
End: 2022-02-21
Payer: COMMERCIAL

## 2022-02-21 VITALS
HEART RATE: 73 BPM | BODY MASS INDEX: 34.84 KG/M2 | HEIGHT: 67 IN | OXYGEN SATURATION: 98 % | DIASTOLIC BLOOD PRESSURE: 70 MMHG | RESPIRATION RATE: 16 BRPM | SYSTOLIC BLOOD PRESSURE: 126 MMHG | TEMPERATURE: 97 F | WEIGHT: 222 LBS

## 2022-02-21 DIAGNOSIS — Z08 ENCOUNTER FOR FOLLOW-UP SURVEILLANCE OF ENDOMETRIAL CANCER: Primary | ICD-10-CM

## 2022-02-21 DIAGNOSIS — E89.40 SURGICAL MENOPAUSE: ICD-10-CM

## 2022-02-21 DIAGNOSIS — Z85.42 ENCOUNTER FOR FOLLOW-UP SURVEILLANCE OF ENDOMETRIAL CANCER: Primary | ICD-10-CM

## 2022-02-21 PROCEDURE — 3078F DIAST BP <80 MM HG: CPT | Performed by: OBSTETRICS & GYNECOLOGY

## 2022-02-21 PROCEDURE — 3074F SYST BP LT 130 MM HG: CPT | Performed by: OBSTETRICS & GYNECOLOGY

## 2022-02-21 PROCEDURE — 99213 OFFICE O/P EST LOW 20 MIN: CPT | Performed by: OBSTETRICS & GYNECOLOGY

## 2022-02-24 DIAGNOSIS — E11.9 TYPE 2 DIABETES MELLITUS WITHOUT COMPLICATION, WITHOUT LONG-TERM CURRENT USE OF INSULIN (HCC): ICD-10-CM

## 2022-08-19 NOTE — PROGRESS NOTES
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  Clinically VARINDER  Exam benign    Pt is now >2years after diagnosis and as such, surveillance visits can be spaced to every 6mon to 1year if she has no symptoms/concerns  REASON FOR VISIT:   Survivorship    Problem:  Cancer Staging  Endometrial cancer ([place] [address] )  Staging form: Corpus Uteri - Carcinoma, AJCC 8th Edition  - Clinical: FIGO Stage IA (cN0(sn), cM0) - Signed by Britta Aj MD on 5/4/2020        Previous therapy:  Oncology History   Endometrial cancer ([facility] )   3/13/2020 Initial Diagnosis    Endometrial cancer ([place] [address] ) on EMB     4/16/2020 Surgery    RA-tlh/bso/slnd     5/4/2020 -  Cancer Staged    Staging form: Corpus Uteri - Carcinoma, AJCC 8th Edition  - Clinical: FIGO Stage IA (cN0(sn), cM0) - Signed by Britta Aj MD on 5/4/2020  Method of lymph node assessment: [place] lymph node biopsy  Histologic grade (G): G1  Histologic grading system: 3 grade system  National guidelines used in treatment planning: Yes  Type of national guideline used in treatment planning: NCCN             Patient ID: Ermias Gilman is a 43 y o  female  37yo with DM2 and obesity here for surveillance of 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  Pt denies abdominal bloating/pain/cramping  No vaginal bleeding/discharge  No changes in BM/urination  No early satiety/Appetite adequate  Denies significant weight loss/weight gain  Occasional hot flash but manageable             Review of Systems   Constitutional: Negative for appetite change, chills, fatigue and fever  Respiratory: Negative for chest tightness and shortness of breath  Gastrointestinal: Negative for abdominal distention, abdominal pain, constipation, diarrhea and nausea  Genitourinary: Negative for difficulty urinating, flank pain, frequency, urgency, vaginal bleeding, vaginal discharge and vaginal pain  Musculoskeletal: Negative for back pain, joint swelling and myalgias  Skin: Negative for rash  Neurological: Negative for dizziness, light-headedness, numbness and headaches  Psychiatric/Behavioral: Negative for behavioral problems  The patient is not nervous/anxious  Objective:    Last menstrual period 03/02/2020  There is no height or weight on file to calculate BMI  There is no height or weight on file to calculate BSA  Physical Exam  HENT:      Head: Normocephalic and atraumatic  Cardiovascular:      Rate and Rhythm: Normal rate and regular rhythm  Pulmonary:      Effort: Pulmonary effort is normal    Abdominal:      General: There is no distension  Palpations: Abdomen is soft  There is no mass  Genitourinary:     Comments: The external female genitalia is normal  The bartholin's, uretheral and skenes glands are normal  The urethral meatus is normal (midline with no lesions)  Anus without fissure or lesion  Speculum exam reveals a grossly normal vagina cuff  No masses, lesions,discharge or bleeding  No significant cystocele or rectocele noted  Bimanual exam notes a surgical absent cervix, uterus and adnexal structures  No masses or fullness  Bladder is without fullness, mass or tenderness  Musculoskeletal:         General: Normal range of motion  Cervical back: Normal range of motion  Skin:     General: Skin is warm and dry  Neurological:      Mental Status: She is alert and oriented to person, place, and time               Discussed symptoms related to disease recurrence, Yes    Evaluated for late effects related to cancer treatment, Yes, describe: surgical scar    Screening current for breast cancer, Yes, describe: 2022    Screening current for colon cancer, Yes, describe: not indicated    Management of obesity addressed, Yes, describe: pt actively trying    Screening current for osteoporosis, Yes, describe: would recommend around age 54    Oncology Treatment Summary reviewed with patient and copy provided, Yes    Survivorship Distress Thermometer Reviewed, Yes

## 2022-08-19 NOTE — ASSESSMENT & PLAN NOTE
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

## 2022-08-22 ENCOUNTER — ONCOLOGY SURVIVORSHIP (OUTPATIENT)
Dept: GYNECOLOGIC ONCOLOGY | Facility: CLINIC | Age: 42
End: 2022-08-22
Payer: COMMERCIAL

## 2022-08-22 VITALS
DIASTOLIC BLOOD PRESSURE: 80 MMHG | HEIGHT: 67 IN | SYSTOLIC BLOOD PRESSURE: 124 MMHG | BODY MASS INDEX: 35.24 KG/M2 | WEIGHT: 224.5 LBS | TEMPERATURE: 98.5 F | RESPIRATION RATE: 16 BRPM | OXYGEN SATURATION: 98 % | HEART RATE: 74 BPM

## 2022-08-22 DIAGNOSIS — Z08 ENCOUNTER FOR FOLLOW-UP SURVEILLANCE OF ENDOMETRIAL CANCER: Primary | ICD-10-CM

## 2022-08-22 DIAGNOSIS — E89.40 SURGICAL MENOPAUSE: ICD-10-CM

## 2022-08-22 DIAGNOSIS — Z85.42 ENCOUNTER FOR FOLLOW-UP SURVEILLANCE OF ENDOMETRIAL CANCER: Primary | ICD-10-CM

## 2022-08-22 PROCEDURE — 99214 OFFICE O/P EST MOD 30 MIN: CPT | Performed by: OBSTETRICS & GYNECOLOGY

## 2022-09-09 DIAGNOSIS — E11.9 TYPE 2 DIABETES MELLITUS WITHOUT COMPLICATION, WITHOUT LONG-TERM CURRENT USE OF INSULIN (HCC): ICD-10-CM

## 2022-09-09 NOTE — TELEPHONE ENCOUNTER
Patient left a detailed message to schedule an appointment for a 6 month follow with Dr Darnell Agent

## 2022-09-19 LAB
ALBUMIN SERPL-MCNC: 4.7 G/DL (ref 3.6–5.1)
ALBUMIN/GLOB SERPL: 1.7 (CALC) (ref 1–2.5)
ALP SERPL-CCNC: 64 U/L (ref 31–125)
ALT SERPL-CCNC: 17 U/L (ref 6–29)
AST SERPL-CCNC: 14 U/L (ref 10–30)
BASOPHILS # BLD AUTO: 39 CELLS/UL (ref 0–200)
BASOPHILS NFR BLD AUTO: 0.8 %
BILIRUB SERPL-MCNC: 0.4 MG/DL (ref 0.2–1.2)
BUN SERPL-MCNC: 27 MG/DL (ref 7–25)
BUN/CREAT SERPL: 36 (CALC) (ref 6–22)
CALCIUM SERPL-MCNC: 10.2 MG/DL (ref 8.6–10.2)
CHLORIDE SERPL-SCNC: 105 MMOL/L (ref 98–110)
CHOLEST SERPL-MCNC: 196 MG/DL
CHOLEST/HDLC SERPL: 5 (CALC)
CO2 SERPL-SCNC: 30 MMOL/L (ref 20–32)
CREAT SERPL-MCNC: 0.75 MG/DL (ref 0.5–0.99)
EOSINOPHIL # BLD AUTO: 98 CELLS/UL (ref 15–500)
EOSINOPHIL NFR BLD AUTO: 2 %
ERYTHROCYTE [DISTWIDTH] IN BLOOD BY AUTOMATED COUNT: 12.6 % (ref 11–15)
GFR/BSA.PRED SERPLBLD CYS-BASED-ARV: 102 ML/MIN/1.73M2
GLOBULIN SER CALC-MCNC: 2.8 G/DL (CALC) (ref 1.9–3.7)
GLUCOSE SERPL-MCNC: 95 MG/DL (ref 65–99)
HBA1C MFR BLD: 5.1 % OF TOTAL HGB
HCT VFR BLD AUTO: 44.6 % (ref 35–45)
HDLC SERPL-MCNC: 39 MG/DL
HGB BLD-MCNC: 15.2 G/DL (ref 11.7–15.5)
LDLC SERPL CALC-MCNC: 137 MG/DL (CALC)
LYMPHOCYTES # BLD AUTO: 1313 CELLS/UL (ref 850–3900)
LYMPHOCYTES NFR BLD AUTO: 26.8 %
MCH RBC QN AUTO: 29.3 PG (ref 27–33)
MCHC RBC AUTO-ENTMCNC: 34.1 G/DL (ref 32–36)
MCV RBC AUTO: 85.9 FL (ref 80–100)
MONOCYTES # BLD AUTO: 309 CELLS/UL (ref 200–950)
MONOCYTES NFR BLD AUTO: 6.3 %
NEUTROPHILS # BLD AUTO: 3141 CELLS/UL (ref 1500–7800)
NEUTROPHILS NFR BLD AUTO: 64.1 %
NONHDLC SERPL-MCNC: 157 MG/DL (CALC)
PLATELET # BLD AUTO: 191 THOUSAND/UL (ref 140–400)
PMV BLD REES-ECKER: 10.3 FL (ref 7.5–12.5)
POTASSIUM SERPL-SCNC: 4.6 MMOL/L (ref 3.5–5.3)
PROT SERPL-MCNC: 7.5 G/DL (ref 6.1–8.1)
RBC # BLD AUTO: 5.19 MILLION/UL (ref 3.8–5.1)
SODIUM SERPL-SCNC: 143 MMOL/L (ref 135–146)
TRIGL SERPL-MCNC: 92 MG/DL
WBC # BLD AUTO: 4.9 THOUSAND/UL (ref 3.8–10.8)

## 2022-09-19 PROCEDURE — 3044F HG A1C LEVEL LT 7.0%: CPT | Performed by: FAMILY MEDICINE

## 2022-09-22 ENCOUNTER — OFFICE VISIT (OUTPATIENT)
Dept: FAMILY MEDICINE CLINIC | Facility: CLINIC | Age: 42
End: 2022-09-22
Payer: COMMERCIAL

## 2022-09-22 VITALS
WEIGHT: 228 LBS | DIASTOLIC BLOOD PRESSURE: 78 MMHG | BODY MASS INDEX: 35.79 KG/M2 | HEIGHT: 67 IN | RESPIRATION RATE: 16 BRPM | HEART RATE: 80 BPM | OXYGEN SATURATION: 98 % | SYSTOLIC BLOOD PRESSURE: 130 MMHG

## 2022-09-22 DIAGNOSIS — E11.9 TYPE 2 DIABETES MELLITUS WITHOUT COMPLICATION, WITHOUT LONG-TERM CURRENT USE OF INSULIN (HCC): Primary | ICD-10-CM

## 2022-09-22 DIAGNOSIS — L60.9 NAIL ABNORMALITY: ICD-10-CM

## 2022-09-22 DIAGNOSIS — E89.40 SURGICAL MENOPAUSE: ICD-10-CM

## 2022-09-22 DIAGNOSIS — E78.2 MIXED HYPERLIPIDEMIA: ICD-10-CM

## 2022-09-22 LAB
LEFT EYE DIABETIC RETINOPATHY: NORMAL
LEFT EYE IMAGE QUALITY: NORMAL
LEFT EYE MACULAR EDEMA: NORMAL
LEFT EYE OTHER RETINOPATHY: NORMAL
RIGHT EYE DIABETIC RETINOPATHY: NORMAL
RIGHT EYE IMAGE QUALITY: NORMAL
RIGHT EYE MACULAR EDEMA: NORMAL
RIGHT EYE OTHER RETINOPATHY: NORMAL
SEVERITY (EYE EXAM): NORMAL

## 2022-09-22 PROCEDURE — 99214 OFFICE O/P EST MOD 30 MIN: CPT | Performed by: FAMILY MEDICINE

## 2022-09-22 PROCEDURE — 2025F 7 FLD RTA PHOTO W/O RTNOPTHY: CPT | Performed by: FAMILY MEDICINE

## 2022-09-22 PROCEDURE — 3075F SYST BP GE 130 - 139MM HG: CPT | Performed by: FAMILY MEDICINE

## 2022-09-22 PROCEDURE — 92250 FUNDUS PHOTOGRAPHY W/I&R: CPT | Performed by: FAMILY MEDICINE

## 2022-09-22 PROCEDURE — 3078F DIAST BP <80 MM HG: CPT | Performed by: FAMILY MEDICINE

## 2022-09-22 RX ORDER — SIMVASTATIN 10 MG
10 TABLET ORAL
Qty: 90 TABLET | Refills: 3 | Status: SHIPPED | OUTPATIENT
Start: 2022-09-22

## 2022-09-22 NOTE — PROGRESS NOTES
Name: Sammy Dumont      : 1980      MRN: [id number]  Encounter Provider: David Simons MD  Encounter Date: 2022   Encounter department: [facility]     1  Type 2 diabetes mellitus without complication, without long-term current use of insulin [facility])  Assessment & Plan:    Lab Results   Component Value Date    HGBA1C 5 1 2022   She will continue taking metformin and she has no hypoglycemia, she checks her blood sugar daily  Cont low carb diet    Orders:  -     IRIS Diabetic eye exam  -     Diabetic foot exam; Future  -     Ambulatory Referral to Podiatry; Future  -     CBC and differential; Future; Expected date: 2022  -     Comprehensive metabolic panel; Future; Expected date: 2022  -     Hemoglobin A1C; Future; Expected date: 2022  -     Microalbumin / creatinine urine ratio  -     TSH, 3rd generation with Free T4 reflex; Future; Expected date: 2022    2  Nail abnormality  Assessment & Plan:  Her left big toenail is partially  and looks dead, advised to see the podiatrist it can be removed    Orders:  -     Ambulatory Referral to Podiatry; Future    3  Mixed hyperlipidemia  -     simvastatin (ZOCOR) 10 mg tablet; Take 1 tablet (10 mg total) by mouth daily at bedtime  -     Lipid panel; Future; Expected date: 2022  -     TSH, 3rd generation with Free T4 reflex; Future; Expected date: 2022    4  Surgical menopause    BMI Counseling: Body mass index is 35 71 kg/m²  The BMI is above normal  Nutrition recommendations include decreasing portion sizes, encouraging healthy choices of fruits and vegetables, decreasing fast food intake, consuming healthier snacks, limiting drinks that contain sugar, moderation in carbohydrate intake and reducing intake of cholesterol  Exercise recommendations include exercising 3-5 times per week and strength training exercises   Rationale for BMI follow-up plan is due to patient being overweight or obese  Subjective     HPI  Review of Systems   Constitutional: Negative for activity change, appetite change, chills, fatigue, fever and unexpected weight change  HENT: Negative for congestion, ear discharge, ear pain, nosebleeds, postnasal drip, rhinorrhea, sinus pressure, sneezing, sore throat, trouble swallowing and voice change  Eyes: Negative for photophobia, pain, discharge, redness and itching  Respiratory: Negative for cough, chest tightness, shortness of breath and wheezing  Cardiovascular: Negative for chest pain, palpitations and leg swelling  Gastrointestinal: Negative for abdominal pain, constipation, diarrhea, nausea and vomiting  Endocrine: Negative for polyuria  Genitourinary: Negative for dysuria, frequency and urgency  Musculoskeletal: Negative for arthralgias, back pain, myalgias and neck pain  Skin: Negative for color change, pallor and rash  Allergic/Immunologic: Negative for environmental allergies and food allergies  Neurological: Negative for dizziness, weakness, light-headedness and headaches  Hematological: Negative for adenopathy  Does not bruise/bleed easily  Psychiatric/Behavioral: Negative for behavioral problems  The patient is not nervous/anxious  Depression Screening Follow-up Plan: Patient's depression screening was positive with a PHQ-2 score of   Their PHQ-9 score was   Continue regular follow-up with their psychologist/therapist/psychiatrist who is managing their mental health condition(s)  Past Medical History:   Diagnosis Date    Anemia     Anxiety     Depression     [facility]     [facility] cancer (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   See Note    Generalized anxiety disorder 3/6/2021    GERD (gastroesophageal reflux disease)     History of transfusion 08/2019-3/14/20    Severe [facility] of [facility], [facility] 3/[facility]/2021    Syncope     Due to blood loss    [facility] ([facility] ) 2019     Past Surgical History:   Procedure Laterality Date    HYSTERECTOMY  2019    NO PAST SURGERIES      OOPHORECTOMY  2019    AK LAPAROSCOPY W TOT HYSTERECTUTERUS <=250 GRAM  W TUBE/OVARY N/A 4/16/2020    Procedure: ROBOTIC TOTAL LAPAROSCOPIC HYSTERECTOMY, BILATERAL SALPINGO-OOPHORECTOMY, SENTINEL LYMPH NODE BIOPSY;  Surgeon: Morgan Chester MD;  [facility]: [facility];  Service: Gynecology Oncology     Family History   Problem Relation Age of Onset    Hypertension Mother     No Known Problems Father     Lung cancer Maternal Grandfather     No Known Problems Brother     Cancer Maternal Aunt     No Known Problems Maternal Aunt     No Known Problems Maternal Aunt     No Known Problems Paternal Aunt      Social History     Socioeconomic History    Marital status: Single     Spouse name: None    Number of children: None    Years of education: None    Highest education level: None   Occupational History    Occupation: Day care worker   Tobacco Use    Smoking status: Never Smoker    Smokeless tobacco: Never Used   Vaping Use    Vaping Use: Never used   Substance and Sexual Activity    Alcohol use: Yes     Comment: couple times per year-holidays    Drug use: No    Sexual activity: Never   Other Topics Concern    None   Social History Narrative    None     Social Determinants of Health     Financial Resource Strain: Not on file   Food Insecurity: Not on file   Transportation Needs: Not on file   Physical Activity: Not on file   Stress: Not on file   Social Connections: Not on file   Intimate Partner Violence: Not on file   Housing Stability: Not on file     Current Outpatient Medications on File Prior to Visit   Medication Sig    ARIPiprazole (ABILIFY) 2 mg tablet Take 1 tablet (2 mg total) by mouth daily At 9am    Blood Glucose Monitoring Suppl (ACCU-CHEK GUIDE) w/Device KIT daily Use as directed    busPIRone (BUSPAR) 10 mg tablet Take 1 tablet (10 mg total) by mouth 2 (two) times a day At 9am and 6pm    Calcium Carb-Cholecalciferol (Oyster Shell Calcium w/D) 500-200 MG-UNIT TABS No take 2 tablets by mouth once daily with breakfast    calcium carbonate-vitamin D (OSCAL-D) 500 mg-200 units per tablet Take 2 tablets by mouth daily with breakfast    cholecalciferol (VITAMIN D3) 1,000 units tablet Take 1,000 Units by mouth daily    Lancets (ACCU-CHEK SOFT TOUCH) lancets Use as instructed once daily    metFORMIN (GLUCOPHAGE) 500 mg tablet Take 1 tablet (500 mg total) by mouth 2 (two) times a day with meals    sertraline (ZOLOFT) 100 mg tablet Take 2 tablets (200 mg total) by mouth daily At 9am     No Known Allergies  Immunization History   Administered Date(s) Administered    COVID-19 PFIZER VACCINE 0 3 ML IM 01/06/2021, 01/27/2021    INFLUENZA 11/01/2018, 10/07/2019, 11/14/2021    Pneumococcal Polysaccharide PPV23 10/09/2019    Td (adult), adsorbed 02/04/2007    Tdap 03/23/2021    Tuberculin Skin Test-PPD Intradermal 03/10/2011, 06/08/2015    Varicella 10/04/2007       Objective     /78   Pulse 80   Resp 16   Ht 5' 7" (1 702 m)   Wt 103 kg (228 lb)   LMP 03/02/2020   SpO2 98%   BMI 35 71 kg/m²     Physical Exam  Vitals and nursing note reviewed  Constitutional:       Appearance: She is well-developed  HENT:      Head: Normocephalic and atraumatic  Right Ear: External ear normal       Left Ear: External ear normal    Eyes:      General: No scleral icterus  Right eye: No discharge  Left eye: No discharge  Extraocular Movements: Extraocular movements intact        Conjunctiva/sclera: Conjunctivae normal    Neck:      Thyroid: No thyromegaly  Trachea: No tracheal deviation  Cardiovascular:      Rate and Rhythm: Normal rate and regular rhythm  Pulses: no weak pulses          Dorsalis pedis pulses are 2+ on the right side and 2+ on the left side  Heart sounds: Normal heart sounds  No murmur heard  Pulmonary:      Effort: Pulmonary effort is normal  No respiratory distress  Breath sounds: Normal breath sounds  No wheezing or rales  Abdominal:      General: Bowel sounds are normal  There is no distension  Palpations: Abdomen is soft  There is no mass  Tenderness: There is no abdominal tenderness  There is no rebound  Musculoskeletal:         General: Normal range of motion  Cervical back: Normal range of motion and neck supple  Right lower leg: No edema  Left lower leg: No edema  Comments: Left big toe nail partially     Feet:      Right foot:      Skin integrity: No ulcer, skin breakdown, erythema, warmth, callus or dry skin  Left foot:      Skin integrity: No ulcer, skin breakdown, erythema, warmth, callus or dry skin  Lymphadenopathy:      Cervical: No cervical adenopathy  Skin:     General: Skin is warm  Coloration: Skin is not pale  Findings: No bruising, erythema or rash  Neurological:      Mental Status: She is alert and oriented to person, place, and time  Cranial Nerves: No cranial nerve deficit  Deep Tendon Reflexes: Reflexes are normal and symmetric  Psychiatric:         Behavior: Behavior normal          Thought Content: Thought content normal          Judgment: Judgment normal      Patient's shoes and socks removed  Right Foot/Ankle   Right Foot Inspection  Skin Exam: skin normal  Skin not intact, no dry skin, no warmth, no callus, no erythema, no maceration, no abnormal color, no pre-ulcer, no ulcer and no callus  Toe Exam: ROM and strength within normal limits       Sensory   Vibration: intact  Proprioception: intact  Monofilament testing: intact    Vascular  Capillary refills: < 3 seconds  The right DP pulse is 2+  Left Foot/Ankle  Left Foot Inspection  Skin Exam: skin normal  Skin not intact, no dry skin, no warmth, no erythema, no maceration, normal color, no pre-ulcer, no ulcer and no callus  Toe Exam: ROM and strength within normal limits  Sensory   Vibration: intact  Proprioception: intact  Monofilament testing: intact    Vascular  Capillary refills: < 3 seconds  The left DP pulse is 2+       Assign Risk Category  No deformity present  No loss of protective sensation  No weak pulses  Risk: 0      Shyanne Church MD

## 2022-09-22 NOTE — PATIENT INSTRUCTIONS

## 2022-09-22 NOTE — ASSESSMENT & PLAN NOTE
Lab Results   Component Value Date    HGBA1C 5 1 09/19/2022   She will continue taking metformin and she has no hypoglycemia, she checks her blood sugar daily  Cont low carb diet

## 2022-10-09 DIAGNOSIS — E11.9 TYPE 2 DIABETES MELLITUS WITHOUT COMPLICATION, WITHOUT LONG-TERM CURRENT USE OF INSULIN (HCC): ICD-10-CM

## 2022-11-07 ENCOUNTER — VBI (OUTPATIENT)
Dept: ADMINISTRATIVE | Facility: OTHER | Age: 42
End: 2022-11-07

## 2022-11-23 DIAGNOSIS — E11.9 TYPE 2 DIABETES MELLITUS WITHOUT COMPLICATION, WITHOUT LONG-TERM CURRENT USE OF INSULIN (HCC): ICD-10-CM

## 2022-12-20 DIAGNOSIS — E11.9 TYPE 2 DIABETES MELLITUS WITHOUT COMPLICATION, WITHOUT LONG-TERM CURRENT USE OF INSULIN (HCC): ICD-10-CM

## 2023-01-16 DIAGNOSIS — E11.9 TYPE 2 DIABETES MELLITUS WITHOUT COMPLICATION, WITHOUT LONG-TERM CURRENT USE OF INSULIN (HCC): ICD-10-CM

## 2023-02-12 LAB
ALBUMIN SERPL-MCNC: 4.4 G/DL (ref 3.6–5.1)
ALBUMIN/CREAT UR: 2 MCG/MG CREAT
ALBUMIN/GLOB SERPL: 1.7 (CALC) (ref 1–2.5)
ALP SERPL-CCNC: 59 U/L (ref 31–125)
ALT SERPL-CCNC: 22 U/L (ref 6–29)
AST SERPL-CCNC: 15 U/L (ref 10–30)
BASOPHILS # BLD AUTO: 28 CELLS/UL (ref 0–200)
BASOPHILS NFR BLD AUTO: 0.6 %
BILIRUB SERPL-MCNC: 0.4 MG/DL (ref 0.2–1.2)
BUN SERPL-MCNC: 26 MG/DL (ref 7–25)
BUN/CREAT SERPL: 40 (CALC) (ref 6–22)
CALCIUM SERPL-MCNC: 9.6 MG/DL (ref 8.6–10.2)
CHLORIDE SERPL-SCNC: 108 MMOL/L (ref 98–110)
CHOLEST SERPL-MCNC: 141 MG/DL
CHOLEST/HDLC SERPL: 4.1 (CALC)
CO2 SERPL-SCNC: 29 MMOL/L (ref 20–32)
CREAT SERPL-MCNC: 0.65 MG/DL (ref 0.5–0.99)
CREAT UR-MCNC: 109 MG/DL (ref 20–275)
EOSINOPHIL # BLD AUTO: 80 CELLS/UL (ref 15–500)
EOSINOPHIL NFR BLD AUTO: 1.7 %
ERYTHROCYTE [DISTWIDTH] IN BLOOD BY AUTOMATED COUNT: 12.6 % (ref 11–15)
GFR/BSA.PRED SERPLBLD CYS-BASED-ARV: 113 ML/MIN/1.73M2
GLOBULIN SER CALC-MCNC: 2.6 G/DL (CALC) (ref 1.9–3.7)
GLUCOSE SERPL-MCNC: 100 MG/DL (ref 65–99)
HBA1C MFR BLD: 5.4 % OF TOTAL HGB
HCT VFR BLD AUTO: 43.7 % (ref 35–45)
HDLC SERPL-MCNC: 34 MG/DL
HGB BLD-MCNC: 15 G/DL (ref 11.7–15.5)
LDLC SERPL CALC-MCNC: 91 MG/DL (CALC)
LYMPHOCYTES # BLD AUTO: 1217 CELLS/UL (ref 850–3900)
LYMPHOCYTES NFR BLD AUTO: 25.9 %
MCH RBC QN AUTO: 29.5 PG (ref 27–33)
MCHC RBC AUTO-ENTMCNC: 34.3 G/DL (ref 32–36)
MCV RBC AUTO: 85.9 FL (ref 80–100)
MICROALBUMIN UR-MCNC: 0.2 MG/DL
MONOCYTES # BLD AUTO: 353 CELLS/UL (ref 200–950)
MONOCYTES NFR BLD AUTO: 7.5 %
NEUTROPHILS # BLD AUTO: 3022 CELLS/UL (ref 1500–7800)
NEUTROPHILS NFR BLD AUTO: 64.3 %
NONHDLC SERPL-MCNC: 107 MG/DL (CALC)
PLATELET # BLD AUTO: 193 THOUSAND/UL (ref 140–400)
PMV BLD REES-ECKER: 10.5 FL (ref 7.5–12.5)
POTASSIUM SERPL-SCNC: 4.6 MMOL/L (ref 3.5–5.3)
PROT SERPL-MCNC: 7 G/DL (ref 6.1–8.1)
RBC # BLD AUTO: 5.09 MILLION/UL (ref 3.8–5.1)
SODIUM SERPL-SCNC: 143 MMOL/L (ref 135–146)
TRIGL SERPL-MCNC: 70 MG/DL
TSH SERPL-ACNC: 1.61 MIU/L
WBC # BLD AUTO: 4.7 THOUSAND/UL (ref 3.8–10.8)

## 2023-02-13 ENCOUNTER — OFFICE VISIT (OUTPATIENT)
Dept: FAMILY MEDICINE CLINIC | Facility: CLINIC | Age: 43
End: 2023-02-13

## 2023-02-13 VITALS
DIASTOLIC BLOOD PRESSURE: 80 MMHG | HEIGHT: 67 IN | SYSTOLIC BLOOD PRESSURE: 120 MMHG | OXYGEN SATURATION: 98 % | HEART RATE: 88 BPM | WEIGHT: 241 LBS | BODY MASS INDEX: 37.83 KG/M2

## 2023-02-13 DIAGNOSIS — F32.5 MAJOR DEPRESSIVE DISORDER IN FULL REMISSION, UNSPECIFIED WHETHER RECURRENT (HCC): ICD-10-CM

## 2023-02-13 DIAGNOSIS — I10 HYPERTENSION, ESSENTIAL: ICD-10-CM

## 2023-02-13 DIAGNOSIS — E11.9 TYPE 2 DIABETES MELLITUS WITHOUT COMPLICATION, WITHOUT LONG-TERM CURRENT USE OF INSULIN (HCC): ICD-10-CM

## 2023-02-13 DIAGNOSIS — E78.2 MIXED HYPERLIPIDEMIA: ICD-10-CM

## 2023-02-13 DIAGNOSIS — Z12.31 ENCOUNTER FOR SCREENING MAMMOGRAM FOR BREAST CANCER: Primary | ICD-10-CM

## 2023-02-13 RX ORDER — SIMVASTATIN 10 MG
10 TABLET ORAL
Qty: 90 TABLET | Refills: 3 | Status: SHIPPED | OUTPATIENT
Start: 2023-02-13

## 2023-02-13 NOTE — ASSESSMENT & PLAN NOTE
Cholesterol improving with the statins, continue low-fat diet and discussed  with her to improve her diet and she can lose weight

## 2023-02-13 NOTE — PROGRESS NOTES
Name: Radha Bates      : 1980      MRN: [id number]  Encounter Provider: Sowmya Turner MD  Encounter Date: 2023   Encounter department: [facility]     1  Encounter for screening mammogram for breast cancer  -     Mammo screening bilateral w 3d & cad; Future; Expected date: 2023    2  Type 2 diabetes mellitus without complication, without long-term current use of insulin (HCC)  Assessment & Plan:    Lab Results   Component Value Date    HGBA1C 5 4 2023   cont same , if she loose more weight or low blood sugar , will stop metformin, she is planning to do the eye exam from ophthalmology    Orders:  -     Hemoglobin A1C; Future; Expected date: 2023  -     Basic metabolic panel; Future; Expected date: 2023  -     CBC and Platelet; Future; Expected date: 2023  -     Lipid Panel with Direct LDL reflex; Future; Expected date: 2023  -     metFORMIN (GLUCOPHAGE) 500 mg tablet; Take 1 tablet (500 mg total) by mouth 2 (two) times a day with meals    3  Hypertension, essential    4  Mixed hyperlipidemia  Assessment & Plan:  Cholesterol improving with the statins, continue low-fat diet and discussed  with her to improve her diet and she can lose weight    Orders:  -     Lipid Panel with Direct LDL reflex; Future; Expected date: 2023  -     simvastatin (ZOCOR) 10 mg tablet; Take 1 tablet (10 mg total) by mouth daily at bedtime    5  Major depressive disorder in full remission, unspecified whether recurrent ([facility]  Assessment & Plan:  Stable on medication and follows psychiatrist        BMI Counseling: Body mass index is 37 75 kg/m²  The BMI is above normal  Nutrition recommendations include decreasing portion sizes and reducing intake of cholesterol  Exercise recommendations include exercising 3-5 times per week and strength training exercises  Rationale for BMI follow-up plan is due to patient being overweight or obese  Subjective     Follow-up diabetes, hyperlipidemia, taking her medications, she has no signs of hypoglycemia, she also seeing psychiatrist for depression which is stable on medications  She has history of hysterectomy due to endometrial cancer and she used to follow-up with her oncologist     Review of Systems   Constitutional: Negative for activity change, appetite change, chills, fatigue, fever and unexpected weight change  HENT: Negative for congestion, ear discharge, ear pain, nosebleeds, postnasal drip, rhinorrhea, sinus pressure, sneezing, sore throat, trouble swallowing and voice change  Eyes: Negative for photophobia, pain, discharge, redness and itching  Respiratory: Negative for cough, chest tightness, shortness of breath and wheezing  Cardiovascular: Negative for chest pain, palpitations and leg swelling  Gastrointestinal: Negative for abdominal pain, constipation, diarrhea, nausea and vomiting  Endocrine: Negative for polyuria  Genitourinary: Negative for dysuria, frequency and urgency  Musculoskeletal: Negative for arthralgias, back pain, myalgias and neck pain  Skin: Negative for color change, pallor and rash  Allergic/Immunologic: Negative for environmental allergies and food allergies  Neurological: Negative for dizziness, weakness, light-headedness and headaches  Hematological: Negative for adenopathy  Does not bruise/bleed easily  Psychiatric/Behavioral: Negative for behavioral problems  The patient is not nervous/anxious  Depression Screening Follow-up Plan: Patient's depression screening was positive with a PHQ-2 score of   Their PHQ-9 score was   Continue regular follow-up with their psychologist/therapist/psychiatrist who is managing their mental health condition(s)  Past Medical History:   Diagnosis Date   • Anemia    • Anxiety    • Depression    • Diabetes ([facility] )    • Endometrial cancer ([facility] 75 ) 4/2/2020    Initial path: A   Uterus, Endometrium, Biopsy: - Consistent with atypical polypoid adenomyoma  See Note    B  Uterus, Cervical Mass, Excision: - Consistent with atypical polypoid adenomyoma  See Note    C  Uterine Cervical Mass, Excision: - Endometrioid adenocarcinoma, FIGO Grade I, 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   See Note   • Generalized anxiety disorder 3/6/2021   • GERD (gastroesophageal reflux disease)    • History of transfusion 08/2019-3/14/20   • Severe episode of recurrent major depressive disorder, without psychotic features ([facility] ) 3/6/2021   • Syncope     Due to blood loss   • Uterine cancer ([facility] ) 2019     Past Surgical History:   Procedure Laterality Date   • HYSTERECTOMY  2019   • NO PAST SURGERIES     • OOPHORECTOMY  2019   • ME LAPS TOTAL HYSTERECT 250 GM/< W/RMVL TUBE/OVARY N/A 4/16/2020    Procedure: ROBOTIC TOTAL LAPAROSCOPIC HYSTERECTOMY, BILATERAL SALPINGO-OOPHORECTOMY, SENTINEL LYMPH NODE BIOPSY;  Surgeon: India Dodson MD;  Location: [facility];  Service: Gynecology Oncology     Family History   Problem Relation Age of Onset   • Hypertension Mother    • No Known Problems Father    • Lung cancer Maternal Grandfather    • No Known Problems Brother    • Cancer Maternal Aunt    • No Known Problems Maternal Aunt    • No Known Problems Maternal Aunt    • No Known Problems Paternal Aunt      Social History     Socioeconomic History   • Marital status: Single     Spouse name: None   • Number of children: None   • Years of education: None   • Highest education level: None   Occupational History   • Occupation: Day care worker   Tobacco Use   • Smoking status: Never   • Smokeless tobacco: Never   Vaping Use   • Vaping Use: Never used   Substance and Sexual Activity   • Alcohol use: Yes     Comment: couple times per year-holidays   • Drug use: No   • Sexual activity: Never   Other Topics Concern   • None   Social History Narrative   • None     Social Determinants of Health     Financial Resource Strain: Not on file   Food Insecurity: Not on file   Transportation Needs: Not on file   Physical Activity: Not on file   Stress: Not on file   Social Connections: Not on file   Intimate Partner Violence: Not on file   Housing Stability: Not on file     Current Outpatient Medications on File Prior to Visit   Medication Sig   • ARIPiprazole (ABILIFY) 2 mg tablet Take 1 tablet (2 mg total) by mouth daily At 9am   • Blood Glucose Monitoring Suppl (ACCU-CHEK GUIDE) w/Device KIT daily Use as directed   • busPIRone (BUSPAR) 10 mg tablet Take 1 tablet (10 mg total) by mouth 2 (two) times a day At 9am and 6pm   • Calcium Carb-Cholecalciferol (Oyster Shell Calcium w/D) 500-200 MG-UNIT TABS No take 2 tablets by mouth once daily with breakfast   • calcium carbonate-vitamin D (OSCAL-D) 500 mg-200 units per tablet Take 2 tablets by mouth daily with breakfast   • cholecalciferol (VITAMIN D3) 1,000 units tablet Take 1,000 Units by mouth daily   • Lancets (ACCU-CHEK SOFT TOUCH) lancets Use as instructed once daily   • sertraline (ZOLOFT) 100 mg tablet Take 2 tablets (200 mg total) by mouth daily At 9am   • [DISCONTINUED] metFORMIN (GLUCOPHAGE) 500 mg tablet take 1 tablet by mouth twice a day with meals   • [DISCONTINUED] simvastatin (ZOCOR) 10 mg tablet Take 1 tablet (10 mg total) by mouth daily at bedtime     No Known Allergies  Immunization History   Administered Date(s) Administered   • COVID-19 PFIZER VACCINE 0 3 ML IM 01/06/2021, 01/27/2021   • INFLUENZA 11/01/2018, 10/07/2019, 11/14/2021   • Pneumococcal Polysaccharide PPV23 10/09/2019   • Td (adult), adsorbed 02/04/2007   • Tdap 03/23/2021   • Tuberculin Skin Test-PPD Intradermal 03/10/2011, 06/08/2015   • Varicella 10/04/2007       Objective     /80   Pulse 88   Ht 5' 7" (1 702 m)   Wt 109 kg (241 lb)   LMP 03/02/2020   SpO2 98%   BMI 37 75 kg/m²     Physical Exam  Vitals and nursing note reviewed  Constitutional:       Appearance: Normal appearance  She is well-developed  She is not ill-appearing  HENT:      Head: Normocephalic and atraumatic  Right Ear: Tympanic membrane, ear canal and external ear normal       Left Ear: Tympanic membrane, ear canal and external ear normal       Nose: No congestion  Mouth/Throat:      Mouth: Mucous membranes are moist    Eyes:      General: No scleral icterus  Conjunctiva/sclera: Conjunctivae normal       Pupils: Pupils are equal, round, and reactive to light  Neck:      Thyroid: No thyromegaly  Cardiovascular:      Rate and Rhythm: Normal rate and regular rhythm  Heart sounds: Normal heart sounds  Pulmonary:      Effort: Pulmonary effort is normal       Breath sounds: Normal breath sounds  No wheezing or rales  Musculoskeletal:      Cervical back: Normal range of motion and neck supple  Right lower leg: No edema  Left lower leg: No edema  Lymphadenopathy:      Cervical: No cervical adenopathy  Skin:     Findings: No erythema or rash  Neurological:      General: No focal deficit present  Mental Status: She is alert     Psychiatric:         Mood and Affect: Mood normal        Mihir Umana MD

## 2023-02-13 NOTE — ASSESSMENT & PLAN NOTE
Lab Results   Component Value Date    HGBA1C 5 4 02/11/2023   cont same , if she loose more weight or low blood sugar , will stop metformin, she is planning to do the eye exam from ophthalmology

## 2023-05-21 ENCOUNTER — HOSPITAL ENCOUNTER (OUTPATIENT)
Dept: MAMMOGRAPHY | Facility: HOSPITAL | Age: 43
Discharge: HOME | End: 2023-05-21
Attending: FAMILY MEDICINE

## 2023-05-21 DIAGNOSIS — Z12.31 ENCOUNTER FOR SCREENING MAMMOGRAM FOR BREAST CANCER: ICD-10-CM

## 2023-05-21 DIAGNOSIS — Z12.31 ENCOUNTER FOR SCREENING MAMMOGRAM FOR MALIGNANT NEOPLASM OF BREAST: ICD-10-CM

## 2023-05-22 ENCOUNTER — TELEPHONE (OUTPATIENT)
Dept: FAMILY MEDICINE CLINIC | Facility: CLINIC | Age: 43
End: 2023-05-22

## 2023-05-22 NOTE — TELEPHONE ENCOUNTER
----- Message from Nano Coronado MD sent at 5/22/2023 11:45 AM EDT -----  Normal mammogram, please inform the patient

## 2023-08-01 ENCOUNTER — OFFICE VISIT (OUTPATIENT)
Dept: URGENT CARE | Facility: CLINIC | Age: 43
End: 2023-08-01
Payer: COMMERCIAL

## 2023-08-01 VITALS
TEMPERATURE: 97.5 F | SYSTOLIC BLOOD PRESSURE: 138 MMHG | DIASTOLIC BLOOD PRESSURE: 79 MMHG | OXYGEN SATURATION: 99 % | HEART RATE: 76 BPM | RESPIRATION RATE: 16 BRPM

## 2023-08-01 DIAGNOSIS — J06.9 ACUTE URI: Primary | ICD-10-CM

## 2023-08-01 PROCEDURE — 99213 OFFICE O/P EST LOW 20 MIN: CPT | Performed by: NURSE PRACTITIONER

## 2023-08-01 RX ORDER — BENZONATATE 200 MG/1
200 CAPSULE ORAL 3 TIMES DAILY PRN
Qty: 20 CAPSULE | Refills: 0 | Status: SHIPPED | OUTPATIENT
Start: 2023-08-01

## 2023-08-01 NOTE — PROGRESS NOTES
[facility] Now        NAME: Brit Quezada is a 37 y.o. female  : 1980    MRN: [id number]  DATE: 2023  TIME: 1:26 PM    Assessment and Plan   Acute URI [J06.9]  1. Acute URI  benzonatate (TESSALON) 200 MG capsule            Patient Instructions     --Rest, drink plenty of fluids  --For nasal/sinus congestion, helpful measures include steam, warm compresses, an OTC saline nasal spray or Neti pot, or an OTC decongestant (such as Sudafed). The decongestant should be avoided, however, if you are under 10years of age, or have a history of high blood pressure or heart disease. In addition, an OTC nasal steroid (Flonase, Nasocort) or nasal decongestant (Afrin, Alan-synephrine) may be taken. The nasal steroid should be used at bedtime, after the saline nasal spray. The nasal decongestant should not be taken more than 3 days consecutively in order to prevent rebound congestion. --For nasal drainage, postnasal drip, sneezing and itching, an OTC antihistamine (Allegra, Benadryl, etc) can be taken. --For cough, you can take an OTC expectorant such as plain Robitussion or Mucinex (active ingredient guaifenesin). A spoonful of honey at bedtime may also be helpful, as may a prescription cough medicine. Also recommended is the use of a cool mist humidifier (with or without Vicks) in the bedroom at night. --For sore throat, you can take OTC lozenges, use warm gargles (salt water or apple cider vinegar and honey), herbal teas, or an OTC throat spray (Chloraseptic). --You can take Tylenol or Motrin/Advil as needed for fever, headache, body aches. Motrin/Advil should be avoided, however, if you have a history of heart disease, bleeding ulcers, or if you take blood thinners. --You should contact your primary care provider and/or go to the ER if your symptoms are not improved or get worse over the next 7 days.   This includes new onset fever, localized ear pain, sinus pain, as well as worsening cough, chest pain, shortness of breath, or significant weakness/fatigue. Chief Complaint     Chief Complaint   Patient presents with   • Cough     Patient presents with cough and chest congestion x3 days with production of mucus. 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 No fevers reported by patient. History of Present Illness       Here with complaints of cough productive of yellow sputum, nasal congestion, rhinorrhea, PND x 3 days. No fever, sore throat. Headache yesterday only. No body aches. No dyspnea, wheezing, CP, ear pain. No abdominal pain, N/V/D. No OTC meds taken. Negative home COVID test.    Works in health care facility. Review of Systems   Review of Systems   Constitutional: Negative for fever. HENT: Positive for postnasal drip and rhinorrhea. Negative for sore throat. Respiratory: Positive for cough. Negative for shortness of breath and wheezing. Cardiovascular: Negative for chest pain. Gastrointestinal: Negative for abdominal pain, nausea and vomiting. Musculoskeletal: Negative for myalgias. Neurological: Positive for headaches.          Current Medications       Current Outpatient Medications:   •  ARIPiprazole (ABILIFY) 2 mg tablet, Take 1 tablet (2 mg total) by mouth daily At 9am, Disp: 30 tablet, Rfl: 1  •  benzonatate (TESSALON) 200 MG capsule, Take 1 capsule (200 mg total) by mouth 3 (three) times a day as needed for cough, Disp: 20 capsule, Rfl: 0  •  Blood Glucose Monitoring Suppl (ACCU-CHEK GUIDE) w/Device KIT, daily Use as directed, Disp: , Rfl: 0  •  busPIRone (BUSPAR) 10 mg tablet, Take 1 tablet (10 mg total) by mouth 2 (two) times a day At 9am and 6pm, Disp: 60 tablet, Rfl: 1  •  Calcium Carb-Cholecalciferol (Oyster Shell Calcium w/D) 500-200 MG-UNIT TABS No, take 2 tablets by mouth once daily with breakfast, Disp: , Rfl:   •  calcium carbonate-vitamin D (OSCAL-D) 500 mg-200 units per tablet, Take 2 tablets by mouth daily with breakfast, Disp: 180 tablet, Rfl: 3  •  cholecalciferol (VITAMIN D3) 1,000 units tablet, Take 1,000 Units by mouth daily, Disp: , Rfl:   •  Lancets (ACCU-CHEK SOFT TOUCH) lancets, Use as instructed once daily, Disp: 100 each, Rfl: 1  •  metFORMIN (GLUCOPHAGE) 500 mg tablet, Take 1 tablet (500 mg total) by mouth 2 (two) times a day with meals, Disp: 180 tablet, Rfl: 1  •  sertraline (ZOLOFT) 100 mg tablet, Take 2 tablets (200 mg total) by mouth daily At 9am, Disp: 60 tablet, Rfl: 1  •  simvastatin (ZOCOR) 10 mg tablet, Take 1 tablet (10 mg total) by mouth daily at bedtime, Disp: 90 tablet, Rfl: 3    Current Allergies     Allergies as of 08/01/2023   • (No Known Allergies)            The following portions of the patient's history were reviewed and updated as appropriate: allergies, current medications, past family history, past medical history, past social history, past surgical history and problem list.     Past Medical History:   Diagnosis Date   • Anemia    • Anxiety    • Depression    • Diabetes ([address])    • Endometrial cancer ([address]) 4/2/2020    Initial path: A. Uterus, Endometrium, Biopsy: - Consistent with atypical polypoid adenomyoma. See Note.   B. Uterus, Cervical Mass, Excision: - Consistent with atypical polypoid adenomyoma. See Note.   C. 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  See Note   • Generalized anxiety disorder 3/6/2021   • GERD (gastroesophageal reflux disease)    • History of transfusion 08/2019-3/14/20   • Severe episode of recurrent major depressive disorder, without psychotic features ([address]) 3/6/2021   • Syncope     Due to blood loss   • Uterine cancer ([address]) 2019       Past Surgical History:   Procedure Laterality Date   • HYSTERECTOMY  2019   • NO PAST SURGERIES     • OOPHORECTOMY  2019   • SD LAPS TOTAL HYSTERECT 250 GM/< W/RMVL TUBE/OVARY N/A 4/16/2020    Procedure: ROBOTIC TOTAL LAPAROSCOPIC HYSTERECTOMY, BILATERAL SALPINGO-OOPHORECTOMY, SENTINEL LYMPH NODE BIOPSY;  Surgeon: Lencho Sadler MD;  Location: [facility];  Service: Gynecology Oncology       Family History   Problem Relation Age of Onset   • Hypertension Mother    • No Known Problems Father    • Lung cancer Maternal Grandfather    • No Known Problems Brother    • Cancer Maternal Aunt    • No Known Problems Maternal Aunt    • No Known Problems Maternal Aunt    • No Known Problems Paternal Aunt          Medications have been verified. Objective   /79   Pulse 76   Temp 97.5 °F (36.4 °C) (Temporal)   LMP 03/02/2020   Patient's last menstrual period was 03/02/2020. Physical Exam     Physical Exam  Constitutional:       General: She is not in acute distress. Appearance: Normal appearance. She is well-developed. She is not ill-appearing, toxic-appearing or diaphoretic. HENT:      Head: Normocephalic. Right Ear: Tympanic membrane, ear canal and external ear normal.      Left Ear: Tympanic membrane, ear canal and external ear normal.      Nose: Congestion and rhinorrhea present. Comments: No sinus tenderness. Mouth/Throat:      Pharynx: No oropharyngeal exudate or posterior oropharyngeal erythema. Eyes:      General:         Right eye: No discharge. Left eye: No discharge. Cardiovascular:      Rate and Rhythm: Normal rate and regular rhythm. Heart sounds: Normal heart sounds. No murmur heard. Pulmonary:      Effort: Pulmonary effort is normal. No respiratory distress. Breath sounds: Normal breath sounds. No stridor. No wheezing, rhonchi or rales. Comments: Breathing non-labored. RR=16. No cough noted. Chest:      Chest wall: No tenderness. Abdominal:      Tenderness: There is no abdominal tenderness. Musculoskeletal:      Cervical back: Neck supple. Lymphadenopathy:      Cervical: No cervical adenopathy. Skin:     General: Skin is warm and dry. Neurological:      Mental Status: She is alert and oriented to person, place, and time. Deep Tendon Reflexes: Reflexes are normal and symmetric.    Psychiatric:         Mood and Affect: Mood normal.

## 2023-08-01 NOTE — PATIENT INSTRUCTIONS
--Rest, drink plenty of fluids  --For nasal/sinus congestion, helpful measures include steam, warm compresses, an OTC saline nasal spray or Neti pot, or an OTC decongestant (such as Sudafed). The decongestant should be avoided, however, if you are under 10years of age, or have a history of high blood pressure or heart disease. In addition, an OTC nasal steroid (Flonase, Nasocort) or nasal decongestant (Afrin, Alan-synephrine) may be taken. The nasal steroid should be used at bedtime, after the saline nasal spray. The nasal decongestant should not be taken more than 3 days consecutively in order to prevent rebound congestion. --For nasal drainage, postnasal drip, sneezing and itching, an OTC antihistamine (Allegra, Benadryl, etc) can be taken. --For cough, you can take an OTC expectorant such as plain Robitussion or Mucinex (active ingredient guaifenesin). A spoonful of honey at bedtime may also be helpful, as may a prescription cough medicine. Also recommended is the use of a cool mist humidifier (with or without Vicks) in the bedroom at night. --For sore throat, you can take OTC lozenges, use warm gargles (salt water or apple cider vinegar and honey), herbal teas, or an OTC throat spray (Chloraseptic). --You can take Tylenol or Motrin/Advil as needed for fever, headache, body aches. Motrin/Advil should be avoided, however, if you have a history of heart disease, bleeding ulcers, or if you take blood thinners. --You should contact your primary care provider and/or go to the ER if your symptoms are not improved or get worse over the next 7 days. This includes new onset fever, localized ear pain, sinus pain, as well as worsening cough, chest pain, shortness of breath, or significant weakness/fatigue.

## 2023-08-18 DIAGNOSIS — E11.9 TYPE 2 DIABETES MELLITUS WITHOUT COMPLICATION, WITHOUT LONG-TERM CURRENT USE OF INSULIN (HCC): ICD-10-CM

## 2023-08-21 ENCOUNTER — TELEPHONE (OUTPATIENT)
Dept: HEMATOLOGY ONCOLOGY | Facility: CLINIC | Age: 43
End: 2023-08-21

## 2023-08-21 NOTE — TELEPHONE ENCOUNTER
Appointment Change  Cancel, Reschedule, Change to Virtual      Who are you speaking with? Patient   If it is not the patient, are they listed on an active communication consent form? N/A   Which provider is the appointment scheduled with? Dr. Adelian Sunshine   When is the appointment scheduled? Please list date and time  08/21/2023 @10AM    At which location is the appointment scheduled to take place? [facility]   Was the appointment rescheduled or changed from an in person visit to a virtual visit? If so, please list the details of the change. Yes, 09/11/2023 @ 10AM    What is the reason for the appointment change? Patient had to turn around on the way there and she is going to miss her appointment. Was [facility] transport scheduled for this visit? No   Does [facility] transport need to be scheduled for the new visit (if applicable) No   Does the patient need an infusion appointment rescheduled? No   Does the patient have an infusion appointment scheduled? If so, when? No   Is the patient undergoing chemotherapy? No   Was the no-show policy reviewed for appointments being changed with less then 24 hours of notice?  No

## 2023-08-24 DIAGNOSIS — E11.9 TYPE 2 DIABETES MELLITUS WITHOUT COMPLICATION, WITHOUT LONG-TERM CURRENT USE OF INSULIN (HCC): ICD-10-CM

## 2023-08-31 ENCOUNTER — LAB (OUTPATIENT)
Dept: LAB | Facility: CLINIC | Age: 43
End: 2023-08-31
Payer: COMMERCIAL

## 2023-08-31 ENCOUNTER — OFFICE VISIT (OUTPATIENT)
Dept: FAMILY MEDICINE CLINIC | Facility: CLINIC | Age: 43
End: 2023-08-31
Payer: COMMERCIAL

## 2023-08-31 VITALS
SYSTOLIC BLOOD PRESSURE: 120 MMHG | WEIGHT: 245 LBS | RESPIRATION RATE: 16 BRPM | OXYGEN SATURATION: 99 % | BODY MASS INDEX: 38.45 KG/M2 | HEIGHT: 67 IN | HEART RATE: 77 BPM | DIASTOLIC BLOOD PRESSURE: 72 MMHG

## 2023-08-31 DIAGNOSIS — L84 CALLUS OF FOOT: ICD-10-CM

## 2023-08-31 DIAGNOSIS — E78.2 MIXED HYPERLIPIDEMIA: ICD-10-CM

## 2023-08-31 DIAGNOSIS — E11.9 TYPE 2 DIABETES MELLITUS WITHOUT COMPLICATION, WITHOUT LONG-TERM CURRENT USE OF INSULIN (HCC): ICD-10-CM

## 2023-08-31 DIAGNOSIS — F41.1 GENERALIZED ANXIETY DISORDER: ICD-10-CM

## 2023-08-31 DIAGNOSIS — E11.9 TYPE 2 DIABETES MELLITUS WITHOUT COMPLICATION, WITHOUT LONG-TERM CURRENT USE OF INSULIN (HCC): Primary | ICD-10-CM

## 2023-08-31 DIAGNOSIS — F32.5 MAJOR DEPRESSIVE DISORDER IN FULL REMISSION, UNSPECIFIED WHETHER RECURRENT (HCC): ICD-10-CM

## 2023-08-31 LAB
ALBUMIN SERPL BCP-MCNC: 4.4 G/DL (ref 3.5–5)
ALP SERPL-CCNC: 72 U/L (ref 34–104)
ALT SERPL W P-5'-P-CCNC: 22 U/L (ref 7–52)
ANION GAP SERPL CALCULATED.3IONS-SCNC: 9 MMOL/L
AST SERPL W P-5'-P-CCNC: 17 U/L (ref 13–39)
BASOPHILS # BLD AUTO: 0.03 THOUSANDS/ÂΜL (ref 0–0.1)
BASOPHILS NFR BLD AUTO: 1 % (ref 0–1)
BILIRUB SERPL-MCNC: 0.41 MG/DL (ref 0.2–1)
BUN SERPL-MCNC: 25 MG/DL (ref 5–25)
CALCIUM SERPL-MCNC: 10.2 MG/DL (ref 8.4–10.2)
CHLORIDE SERPL-SCNC: 104 MMOL/L (ref 96–108)
CHOLEST SERPL-MCNC: 137 MG/DL
CO2 SERPL-SCNC: 28 MMOL/L (ref 21–32)
CREAT SERPL-MCNC: 0.74 MG/DL (ref 0.6–1.3)
CREAT UR-MCNC: 64.4 MG/DL
EOSINOPHIL # BLD AUTO: 0.11 THOUSAND/ÂΜL (ref 0–0.61)
EOSINOPHIL NFR BLD AUTO: 2 % (ref 0–6)
ERYTHROCYTE [DISTWIDTH] IN BLOOD BY AUTOMATED COUNT: 13.5 % (ref 11.6–15.1)
EST. AVERAGE GLUCOSE BLD GHB EST-MCNC: 114 MG/DL
GFR SERPL CREATININE-BSD FRML MDRD: 99 ML/MIN/1.73SQ M
GLUCOSE SERPL-MCNC: 98 MG/DL (ref 65–140)
HBA1C MFR BLD: 5.6 %
HCT VFR BLD AUTO: 46.1 % (ref 34.8–46.1)
HDLC SERPL-MCNC: 33 MG/DL
HGB BLD-MCNC: 15.1 G/DL (ref 11.5–15.4)
IMM GRANULOCYTES # BLD AUTO: 0.08 THOUSAND/UL (ref 0–0.2)
IMM GRANULOCYTES NFR BLD AUTO: 1 % (ref 0–2)
LDLC SERPL CALC-MCNC: 78 MG/DL (ref 0–100)
LYMPHOCYTES # BLD AUTO: 1.48 THOUSANDS/ÂΜL (ref 0.6–4.47)
LYMPHOCYTES NFR BLD AUTO: 23 % (ref 14–44)
MCH RBC QN AUTO: 29.5 PG (ref 26.8–34.3)
MCHC RBC AUTO-ENTMCNC: 32.8 G/DL (ref 31.4–37.4)
MCV RBC AUTO: 90 FL (ref 82–98)
MICROALBUMIN UR-MCNC: <7 MG/L
MICROALBUMIN/CREAT 24H UR: <11 MG/G CREATININE (ref 0–30)
MONOCYTES # BLD AUTO: 0.47 THOUSAND/ÂΜL (ref 0.17–1.22)
MONOCYTES NFR BLD AUTO: 7 % (ref 4–12)
NEUTROPHILS # BLD AUTO: 4.15 THOUSANDS/ÂΜL (ref 1.85–7.62)
NEUTS SEG NFR BLD AUTO: 66 % (ref 43–75)
NRBC BLD AUTO-RTO: 0 /100 WBCS
PLATELET # BLD AUTO: 198 THOUSANDS/UL (ref 149–390)
PMV BLD AUTO: 10.5 FL (ref 8.9–12.7)
POTASSIUM SERPL-SCNC: 4.8 MMOL/L (ref 3.5–5.3)
PROT SERPL-MCNC: 7.5 G/DL (ref 6.4–8.4)
RBC # BLD AUTO: 5.12 MILLION/UL (ref 3.81–5.12)
SODIUM SERPL-SCNC: 141 MMOL/L (ref 135–147)
TRIGL SERPL-MCNC: 131 MG/DL
TSH SERPL DL<=0.05 MIU/L-ACNC: 1.41 UIU/ML (ref 0.45–4.5)
WBC # BLD AUTO: 6.32 THOUSAND/UL (ref 4.31–10.16)

## 2023-08-31 PROCEDURE — 83036 HEMOGLOBIN GLYCOSYLATED A1C: CPT

## 2023-08-31 PROCEDURE — 36415 COLL VENOUS BLD VENIPUNCTURE: CPT

## 2023-08-31 PROCEDURE — 80053 COMPREHEN METABOLIC PANEL: CPT

## 2023-08-31 PROCEDURE — 99214 OFFICE O/P EST MOD 30 MIN: CPT | Performed by: FAMILY MEDICINE

## 2023-08-31 PROCEDURE — 80061 LIPID PANEL: CPT

## 2023-08-31 PROCEDURE — 85025 COMPLETE CBC W/AUTO DIFF WBC: CPT

## 2023-08-31 PROCEDURE — 84443 ASSAY THYROID STIM HORMONE: CPT

## 2023-08-31 NOTE — PROGRESS NOTES
Name: Jhonny Branham      : 1980      MRN: [id number]  Encounter Provider: Debbie Howard MD  Encounter Date: 2023   Encounter department: [address] [place] [address]     1. Type 2 diabetes mellitus without complication, without long-term current use of insulin ([facility])  Assessment & Plan:    Lab Results   Component Value Date    HGBA1C 5.4 2023   cont metformin bid     Orders:  -     IRIS Diabetic eye exam  -     Diabetic foot exam; Future  -     metFORMIN (GLUCOPHAGE) 500 mg tablet; Take 1 tablet (500 mg total) by mouth 2 (two) times a day with meals    2. Mixed hyperlipidemia  Assessment & Plan:  Cont same med simvastatin      3. Generalized anxiety disorder    4. Major depressive disorder in full remission, unspecified whether recurrent [facility])  Assessment & Plan:  Follows psychiatrist and stable on medications      5. Callus of foot  Assessment & Plan:  Callus of the right foot, advised to see her podiatrist, she already has a podiatrist             Subjective     She is here for follow-up, she is diabetic, on metformin trying to lose weight, has been stable and her weight for the last few months, she checks her blood sugar and she says she never have low blood sugar, she needs a refill on metformin, she is also taking simvastatin for hyperlipidemia and she follows psychiatrist for depression and stable on medications. He will glasses ,she also has a podiatrist  Her blood pressure is always normal    Review of Systems   Constitutional: Negative for activity change, appetite change, chills, fatigue, fever and unexpected weight change. HENT: Negative for congestion, ear discharge, ear pain, nosebleeds, postnasal drip, rhinorrhea, sinus pressure, sneezing, sore throat, trouble swallowing and voice change. Eyes: Negative for photophobia, pain, discharge, redness and itching. Respiratory: Negative for cough, chest tightness, shortness of breath and wheezing. QUESTIONS TO ASK YOUR INSURANCE COMPANY IN REGARDS TO THE SHINGLES VACCINE:    1. Does my insurance plan cover the Shingrix shingles vaccine?  2. Is there an age requirement in regards to receiving the vaccine for coverage?  3. Does it matter if I receive the vaccine at my doctor's office or at the pharmacy?     Cardiovascular: Negative for chest pain, palpitations and leg swelling. Gastrointestinal: Negative for abdominal pain, constipation, diarrhea, nausea and vomiting. Endocrine: Negative for polyuria. Genitourinary: Negative for dysuria, frequency and urgency. Musculoskeletal: Negative for arthralgias, back pain, myalgias and neck pain. Skin: Negative for color change, pallor and rash. Allergic/Immunologic: Negative for environmental allergies and food allergies. Neurological: Negative for dizziness, weakness, light-headedness and headaches. Hematological: Negative for adenopathy. Does not bruise/bleed easily. Psychiatric/Behavioral: Negative for behavioral problems and sleep disturbance. The patient is not nervous/anxious. Depression Screening Follow-up Plan: Patient's depression screening was positive with a PHQ-2 score of . Their PHQ-9 score was 0. Patient assessed for underlying major depression. They have no active suicidal ideations. Brief counseling provided and recommend additional follow-up/re-evaluation next office visit. Continue regular follow-up with their psychologist/therapist/psychiatrist who is managing their mental health condition(s). Past Medical History:   Diagnosis Date   • Anemia    • Anxiety    • Depression    • Diabetes ([address])    • Endometrial cancer ([address]) 4/2/2020    Initial path: A. Uterus, Endometrium, Biopsy: - Consistent with atypical polypoid adenomyoma. See Note.   B. Uterus, Cervical Mass, Excision: - Consistent with atypical polypoid adenomyoma. See Note.   C. 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  See Note   • Generalized anxiety disorder 3/6/2021   • GERD (gastroesophageal reflux disease)    • History of transfusion 08/2019-3/14/20   • Severe episode of recurrent major depressive disorder, without psychotic features ([address]) 3/6/2021   • Syncope     Due to blood loss   • Uterine cancer ([address] 2019     Past Surgical History:   Procedure Laterality Date   • HYSTERECTOMY  2019   • NO PAST SURGERIES     • OOPHORECTOMY  2019   • IN LAPS TOTAL HYSTERECT 250 GM/< W/RMVL TUBE/OVARY N/A 4/16/2020    Procedure: ROBOTIC TOTAL LAPAROSCOPIC HYSTERECTOMY, BILATERAL SALPINGO-OOPHORECTOMY, SENTINEL LYMPH NODE BIOPSY;  Surgeon: Cherry Nichols MD;  Location[facility];  Service: Gynecology Oncology     Family History   Problem Relation Age of Onset   • Hypertension Mother    • No Known Problems Father    • Lung cancer Maternal Grandfather    • No Known Problems Brother    • Cancer Maternal Aunt    • No Known Problems Maternal Aunt    • No Known Problems Maternal Aunt    • No Known Problems Paternal Aunt      Social History     Socioeconomic History   • Marital status: Single     Spouse name: None   • Number of children: None   • Years of education: None   • Highest education level: None   Occupational History   • Occupation: Day care worker   Tobacco Use   • Smoking status: Never   • Smokeless tobacco: Never   Vaping Use   • Vaping Use: Never used   Substance and Sexual Activity   • Alcohol use: Yes     Comment: couple times per year-holidays   • Drug use: No   • Sexual activity: Never   Other Topics Concern   • None   Social History Narrative   • None     Social Determinants of Health     Financial Resource Strain: Not on file   Food Insecurity: Not on file   Transportation Needs: Not on file   Physical Activity: Not on file   Stress: Not on file   Social Connections: Not on file   Intimate Partner Violence: Not on file   Housing Stability: Not on file     Current Outpatient Medications on File Prior to Visit   Medication Sig   • ARIPiprazole (ABILIFY) 2 mg tablet Take 1 tablet (2 mg total) by mouth daily At 9am   • benzonatate (TESSALON) 200 MG capsule Take 1 capsule (200 mg total) by mouth 3 (three) times a day as needed for cough   • Blood Glucose Monitoring Suppl (ACCU-CHEK GUIDE) w/Device KIT daily Use as directed   • busPIRone (BUSPAR) 10 mg tablet Take 1 tablet (10 mg total) by mouth 2 (two) times a day At 9am and 6pm   • Calcium Carb-Cholecalciferol (Oyster Shell Calcium w/D) 500-200 MG-UNIT TABS No take 2 tablets by mouth once daily with breakfast   • calcium carbonate-vitamin D (OSCAL-D) 500 mg-200 units per tablet Take 2 tablets by mouth daily with breakfast   • cholecalciferol (VITAMIN D3) 1,000 units tablet Take 1,000 Units by mouth daily   • Lancets (ACCU-CHEK SOFT TOUCH) lancets Use as instructed once daily   • sertraline (ZOLOFT) 100 mg tablet Take 2 tablets (200 mg total) by mouth daily At 9am   • simvastatin (ZOCOR) 10 mg tablet Take 1 tablet (10 mg total) by mouth daily at bedtime   • [DISCONTINUED] metFORMIN (GLUCOPHAGE) 500 mg tablet Take 1 tablet (500 mg total) by mouth 2 (two) times a day with meals     No Known Allergies  Immunization History   Administered Date(s) Administered   • COVID-19 PFIZER VACCINE 0.3 ML IM 01/06/2021, 01/27/2021   • INFLUENZA 11/01/2018, 10/07/2019, 11/14/2021   • Pneumococcal Polysaccharide PPV23 10/09/2019   • Td (adult), adsorbed 02/04/2007   • Tdap 03/23/2021   • Tuberculin Skin Test-PPD Intradermal 03/10/2011, 06/08/2015   • Varicella 10/04/2007       Objective     /72   Pulse 77   Resp 16   Ht 5' 7" (1.702 m)   Wt 111 kg (245 lb)   LMP 03/02/2020   SpO2 99%   BMI 38.37 kg/m²     Physical Exam  Vitals and nursing note reviewed. Constitutional:       Appearance: Normal appearance. She is well-developed. She is not ill-appearing. HENT:      Mouth/Throat:      Pharynx: No oropharyngeal exudate. Eyes:      Extraocular Movements: Extraocular movements intact. Neck:      Thyroid: No thyromegaly. Cardiovascular:      Rate and Rhythm: Normal rate and regular rhythm. Pulses: no weak pulses          Dorsalis pedis pulses are 2+ on the right side and 2+ on the left side. Heart sounds: Normal heart sounds. No murmur heard.   Pulmonary:

## 2023-09-01 ENCOUNTER — TELEPHONE (OUTPATIENT)
Dept: FAMILY MEDICINE CLINIC | Facility: CLINIC | Age: 43
End: 2023-09-01

## 2023-09-01 NOTE — TELEPHONE ENCOUNTER
----- Message from Adriana Cannon MD sent at 8/31/2023  4:33 PM EDT -----  Please inform the patient her labs have been stable, continue same medications

## 2023-09-08 ENCOUNTER — TELEPHONE (OUTPATIENT)
Dept: FAMILY MEDICINE CLINIC | Facility: CLINIC | Age: 43
End: 2023-09-08

## 2023-09-08 NOTE — TELEPHONE ENCOUNTER
----- Message from Adelita Levy MD sent at 9/7/2023  1:32 PM EDT -----  Normal retina exam ,please inform the pt

## 2023-09-11 ENCOUNTER — OFFICE VISIT (OUTPATIENT)
Dept: GYNECOLOGIC ONCOLOGY | Facility: CLINIC | Age: 43
End: 2023-09-11

## 2023-09-11 VITALS
SYSTOLIC BLOOD PRESSURE: 138 MMHG | RESPIRATION RATE: 16 BRPM | DIASTOLIC BLOOD PRESSURE: 82 MMHG | HEIGHT: 67 IN | WEIGHT: 244 LBS | BODY MASS INDEX: 38.3 KG/M2 | HEART RATE: 79 BPM | OXYGEN SATURATION: 98 %

## 2023-09-11 DIAGNOSIS — Z08 ENCOUNTER FOR FOLLOW-UP SURVEILLANCE OF ENDOMETRIAL CANCER: Primary | ICD-10-CM

## 2023-09-11 DIAGNOSIS — Z85.42 ENCOUNTER FOR FOLLOW-UP SURVEILLANCE OF ENDOMETRIAL CANCER: Primary | ICD-10-CM

## 2023-09-11 PROCEDURE — 99213 OFFICE O/P EST LOW 20 MIN: CPT | Performed by: OBSTETRICS & GYNECOLOGY

## 2023-09-11 NOTE — PROGRESS NOTES
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      Clinically VARINDER  Exam benign    Pt is over 3 years out from diagnosis. Continue with yearly surveillance. Can transition back to gyn after 5 years. CHIEF COMPLAINT: follow up       Problem:   Cancer Staging   Endometrial cancer ([address])  Staging form: Corpus Uteri - Carcinoma, AJCC 8th Edition  - Clinical: FIGO Stage IA (cN0(sn), cM0) - Signed by Sara Lerner MD on 5/4/2020        Previous therapy:  Oncology History   Endometrial cancer ([address])   3/13/2020 Initial Diagnosis    Endometrial cancer ([address]) on EMB     4/16/2020 Surgery    RA-tlh/bso/slnd     5/4/2020 -  Cancer Staged    Staging form: Corpus Uteri - Carcinoma, AJCC 8th Edition  - Clinical: FIGO Stage IA (cN0(sn), cM0) - Signed by Sara Lerner MD on 5/4/2020  Method of lymph node assessment: Henniker lymph node biopsy  Histologic grade (G): G1  Histologic grading system: 3 grade system  National guidelines used in treatment planning: Yes  Type of national guideline used in treatment planning: NCCN             Patient ID: Danny Castellanos is a 37 y.o. female  46yo with DM2 and obesity here for surveillance of 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. Patient has no complaints at this time. Patient denies any pelvic pain or discomfort. No vaginal bleeding.       The following portions of the patient's history were reviewed and updated as appropriate: allergies, current medications, past family history, past medical history, past social history, past surgical history and problem list.    Review of Systems Constitutional: Negative. HENT: Negative. Eyes: Negative. Respiratory: Negative. Cardiovascular: Negative. Gastrointestinal: Negative. Endocrine: Negative. Genitourinary: Negative. Musculoskeletal: Negative. Neurological: Negative. Psychiatric/Behavioral: Negative. Current Outpatient Medications   Medication Sig Dispense Refill   • ARIPiprazole (ABILIFY) 2 mg tablet Take 1 tablet (2 mg total) by mouth daily At 9am 30 tablet 1   • benzonatate (TESSALON) 200 MG capsule Take 1 capsule (200 mg total) by mouth 3 (three) times a day as needed for cough 20 capsule 0   • Blood Glucose Monitoring Suppl (ACCU-CHEK GUIDE) w/Device KIT daily Use as directed  0   • busPIRone (BUSPAR) 10 mg tablet Take 1 tablet (10 mg total) by mouth 2 (two) times a day At 9am and 6pm 60 tablet 1   • Calcium Carb-Cholecalciferol (Oyster Shell Calcium w/D) 500-200 MG-UNIT TABS No take 2 tablets by mouth once daily with breakfast     • calcium carbonate-vitamin D (OSCAL-D) 500 mg-200 units per tablet Take 2 tablets by mouth daily with breakfast 180 tablet 3   • cholecalciferol (VITAMIN D3) 1,000 units tablet Take 1,000 Units by mouth daily     • Lancets (ACCU-CHEK SOFT TOUCH) lancets Use as instructed once daily 100 each 1   • metFORMIN (GLUCOPHAGE) 500 mg tablet Take 1 tablet (500 mg total) by mouth 2 (two) times a day with meals 180 tablet 1   • sertraline (ZOLOFT) 100 mg tablet Take 2 tablets (200 mg total) by mouth daily At 9am 60 tablet 1   • simvastatin (ZOCOR) 10 mg tablet Take 1 tablet (10 mg total) by mouth daily at bedtime 90 tablet 3     No current facility-administered medications for this visit. Objective:    Blood pressure 138/82, pulse 79, resp. rate 16, height 5' 7" (1.702 m), weight 111 kg (244 lb), last menstrual period 03/02/2020, SpO2 98 %. Body mass index is 38.22 kg/m². Body surface area is 2.2 meters squared. Physical Exam  HENT:      Head: Normocephalic and atraumatic. Cardiovascular:      Rate and Rhythm: Normal rate and regular rhythm. Pulmonary:      Effort: Pulmonary effort is normal.   Abdominal:      General: There is no distension. Palpations: Abdomen is soft. There is no mass. Genitourinary:     Comments: The external female genitalia is normal. The bartholin's, uretheral and skenes glands are normal. The urethral meatus is normal (midline with no lesions). Anus without fissure or lesion. Speculum exam reveals a grossly normal vagina cuff. No masses, lesions,discharge or bleeding. No significant cystocele or rectocele noted. Bimanual exam notes a surgical absent cervix, uterus and adnexal structures. No masses or fullness. Bladder is without fullness, mass or tenderness. Musculoskeletal:         General: Normal range of motion. Cervical back: Normal range of motion. Skin:     General: Skin is warm and dry. Neurological:      Mental Status: She is alert and oriented to person, place, and time.            Lab Results   Component Value Date    K 4.8 08/31/2023     08/31/2023    CO2 28 08/31/2023    BUN 25 08/31/2023    CREATININE 0.74 08/31/2023    GLUF 79 04/06/2020    CALCIUM 10.2 08/31/2023    AST 17 08/31/2023    ALT 22 08/31/2023    ALKPHOS 72 08/31/2023    EGFR 99 08/31/2023     Lab Results   Component Value Date    WBC 6.32 08/31/2023    HGB 15.1 08/31/2023    HCT 46.1 08/31/2023    MCV 90 08/31/2023     08/31/2023     Lab Results   Component Value Date    NEUTROABS 4.15 08/31/2023        Trend:  No results found for: ""

## 2023-09-11 NOTE — ASSESSMENT & PLAN NOTE
44yo with DM2 and obesity here for surveillance of 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    44yo with DM2 and obesity here for surveillance of 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.      Clinically VARINDER  Exam benign    Pt is over 3 years out from diagnosis. Continue with yearly surveillance. Can transition back to gyn after 5 years.

## 2023-09-21 ENCOUNTER — TELEPHONE (OUTPATIENT)
Age: 43
End: 2023-09-21

## 2023-09-21 DIAGNOSIS — F41.1 GENERALIZED ANXIETY DISORDER: ICD-10-CM

## 2023-09-21 RX ORDER — BUSPIRONE HYDROCHLORIDE 10 MG/1
10 TABLET ORAL 2 TIMES DAILY
Qty: 20 TABLET | Refills: 0 | Status: SHIPPED | OUTPATIENT
Start: 2023-09-21

## 2023-09-21 NOTE — TELEPHONE ENCOUNTER
Patient called in to request refill for busPIRone (BUSPAR) 10 mg tablet. Patient takes 1 tablet two times per day at 9 AM and 6 PM. Patient reports her psychiatrist (Dr. Manisha King) office is closed and she has gotten no response from him since yesterday.  Please follow up with patient for refill request.

## 2023-09-21 NOTE — PROGRESS NOTES
BuSpar sent for 10-day supply, as she could not contact her psychiatrist and she is out of medication, she normally takes it from her psychiatrist and she will contact them for further refill

## 2023-09-25 DIAGNOSIS — E11.9 TYPE 2 DIABETES MELLITUS WITHOUT COMPLICATION, WITHOUT LONG-TERM CURRENT USE OF INSULIN (HCC): ICD-10-CM

## 2023-09-25 DIAGNOSIS — F32.A DEPRESSION, UNSPECIFIED DEPRESSION TYPE: ICD-10-CM

## 2023-09-25 RX ORDER — SERTRALINE HYDROCHLORIDE 100 MG/1
200 TABLET, FILM COATED ORAL DAILY
Qty: 60 TABLET | Refills: 0 | Status: SHIPPED | OUTPATIENT
Start: 2023-09-25

## 2023-09-25 NOTE — TELEPHONE ENCOUNTER
Reason for call:   [x] Refill   [] Prior Auth  [x] Other:  CHANGING FROM [facility] TO [address]     Office:   [x] PCP/Provider -   [] Speciality/Provider -     Medication: METFORMIN, SERTRALINE    Dose/Frequency: 500mg,100mg    Quantity: #60,#60    Pharmacy: [facility]

## 2023-09-28 DIAGNOSIS — E78.2 MIXED HYPERLIPIDEMIA: ICD-10-CM

## 2023-09-28 RX ORDER — SIMVASTATIN 10 MG
10 TABLET ORAL
Qty: 90 TABLET | Refills: 1 | Status: SHIPPED | OUTPATIENT
Start: 2023-09-28

## 2023-09-28 NOTE — TELEPHONE ENCOUNTER
Reason for call:   [x] Refill   [] Prior Auth  [] Other:     Office:   [x] PCP/Provider - Dr Britt[] Speciality/Provider -     Medication:   Simvastatin 10 mg, 1 qd, 90      Pharmacy: [address] [place] [address]    Does the patient have enough for 3 days?    [] Yes   [x] No - Send as HP to POD-out of med

## 2023-12-09 DIAGNOSIS — E11.9 TYPE 2 DIABETES MELLITUS WITHOUT COMPLICATION, WITHOUT LONG-TERM CURRENT USE OF INSULIN (HCC): ICD-10-CM

## 2023-12-21 DIAGNOSIS — E11.9 TYPE 2 DIABETES MELLITUS WITHOUT COMPLICATION, WITHOUT LONG-TERM CURRENT USE OF INSULIN (HCC): ICD-10-CM

## 2023-12-21 NOTE — TELEPHONE ENCOUNTER
This is not a duplicate patient does not use [facility] anymore and needs script to be resent to the [facility] pharmacy.    Reason for call:   [x] Refill   [] Prior Auth  [] Other:     Office:   [x] PCP/Provider -   [] Specialty/Provider -     Medication:   Metformin 500mg- take 1 tablet by mouth twice daily with meals    Pharmacy: [facility]    Does the patient have enough for 3 days?   [] Yes   [x] No - Send as HP to POD

## 2024-01-03 ENCOUNTER — OFFICE VISIT (OUTPATIENT)
Dept: FAMILY MEDICINE CLINIC | Facility: CLINIC | Age: 44
End: 2024-01-03
Payer: COMMERCIAL

## 2024-01-03 VITALS
WEIGHT: 249 LBS | HEART RATE: 100 BPM | HEIGHT: 67 IN | RESPIRATION RATE: 16 BRPM | OXYGEN SATURATION: 97 % | SYSTOLIC BLOOD PRESSURE: 138 MMHG | DIASTOLIC BLOOD PRESSURE: 90 MMHG | BODY MASS INDEX: 39.08 KG/M2

## 2024-01-03 DIAGNOSIS — M79.603 MUSCULOSKELETAL PAIN OF UPPER EXTREMITY, UNSPECIFIED LATERALITY: Primary | ICD-10-CM

## 2024-01-03 PROCEDURE — 99213 OFFICE O/P EST LOW 20 MIN: CPT | Performed by: FAMILY MEDICINE

## 2024-01-03 RX ORDER — NAPROXEN 500 MG/1
500 TABLET ORAL 2 TIMES DAILY WITH MEALS
Qty: 28 TABLET | Refills: 0 | Status: SHIPPED | OUTPATIENT
Start: 2024-01-03

## 2024-01-03 NOTE — PROGRESS NOTES
"Assessment/Plan:   Diagnoses and all orders for this visit:    Musculoskeletal pain of upper extremity, unspecified laterality  -     naproxen (Naprosyn) 500 mg tablet; Take 1 tablet (500 mg total) by mouth 2 (two) times a day with meals  - appears to be MSK in nature   - advised Naproxen BID x2wks and to f/u with PCP - pt aware and agreeable           Subjective:    Patient ID: Frannie Jj is a 43 y.o. female.  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    Review of Systems  as per HPI    Objective:  /90 (BP Location: Right arm, Patient Position: Sitting, Cuff Size: Large)   Pulse 100   Resp 16   Ht 5' 7\" (1.702 m)   Wt 113 kg (249 lb)   LMP 03/02/2020   SpO2 97%   BMI 39.00 kg/m²    Physical Exam  Vitals reviewed.   Constitutional:       General: She is not in acute distress.     Appearance: Normal appearance. She is not ill-appearing, toxic-appearing or diaphoretic.   HENT:      Head: Normocephalic and atraumatic.      Right Ear: External ear normal.      Left Ear: External ear normal.      Nose: Nose normal.   Eyes:      General: No scleral icterus.        Right eye: No discharge.         Left eye: No discharge.      Extraocular Movements: Extraocular movements intact.      Conjunctiva/sclera: Conjunctivae normal.   Pulmonary:      Effort: Pulmonary effort is normal.   Musculoskeletal:         General: No swelling, tenderness, deformity or signs of injury. Normal range of " motion.      Cervical back: Normal range of motion and neck supple. No tenderness.      Comments: +5/5 UE b/l, sensation intact   Neurological:      Mental Status: She is alert.   Psychiatric:         Mood and Affect: Mood is anxious.

## 2024-01-05 ENCOUNTER — NURSE TRIAGE (OUTPATIENT)
Dept: OTHER | Facility: OTHER | Age: 44
End: 2024-01-05

## 2024-01-05 ENCOUNTER — HOSPITAL ENCOUNTER (EMERGENCY)
Facility: HOSPITAL | Age: 44
Discharge: HOME | End: 2024-01-05
Attending: EMERGENCY MEDICINE
Payer: COMMERCIAL

## 2024-01-05 ENCOUNTER — APPOINTMENT (EMERGENCY)
Dept: RADIOLOGY | Facility: HOSPITAL | Age: 44
End: 2024-01-05
Payer: COMMERCIAL

## 2024-01-05 VITALS
OXYGEN SATURATION: 95 % | DIASTOLIC BLOOD PRESSURE: 73 MMHG | BODY MASS INDEX: 39.02 KG/M2 | HEART RATE: 87 BPM | TEMPERATURE: 97.4 F | SYSTOLIC BLOOD PRESSURE: 147 MMHG | WEIGHT: 249.12 LBS | RESPIRATION RATE: 16 BRPM

## 2024-01-05 DIAGNOSIS — R07.89 CHEST DISCOMFORT: Primary | ICD-10-CM

## 2024-01-05 LAB
ANION GAP SERPL CALCULATED.3IONS-SCNC: 10 MMOL/L
ATRIAL RATE: 92 BPM
BASOPHILS # BLD AUTO: 0.04 THOUSANDS/ÂΜL (ref 0–0.1)
BASOPHILS NFR BLD AUTO: 1 % (ref 0–1)
BUN SERPL-MCNC: 22 MG/DL (ref 5–25)
CALCIUM SERPL-MCNC: 10.2 MG/DL (ref 8.4–10.2)
CARDIAC TROPONIN I PNL SERPL HS: <2 NG/L
CHLORIDE SERPL-SCNC: 104 MMOL/L (ref 96–108)
CO2 SERPL-SCNC: 26 MMOL/L (ref 21–32)
CREAT SERPL-MCNC: 0.75 MG/DL (ref 0.6–1.3)
EOSINOPHIL # BLD AUTO: 0.03 THOUSAND/ÂΜL (ref 0–0.61)
EOSINOPHIL NFR BLD AUTO: 0 % (ref 0–6)
ERYTHROCYTE [DISTWIDTH] IN BLOOD BY AUTOMATED COUNT: 13.4 % (ref 11.6–15.1)
EXT PREGNANCY TEST URINE: NEGATIVE
EXT. CONTROL: NORMAL
GFR SERPL CREATININE-BSD FRML MDRD: 97 ML/MIN/1.73SQ M
GLUCOSE SERPL-MCNC: 121 MG/DL (ref 65–140)
HCT VFR BLD AUTO: 47.6 % (ref 34.8–46.1)
HGB BLD-MCNC: 15.5 G/DL (ref 11.5–15.4)
IMM GRANULOCYTES # BLD AUTO: 0.05 THOUSAND/UL (ref 0–0.2)
IMM GRANULOCYTES NFR BLD AUTO: 1 % (ref 0–2)
LYMPHOCYTES # BLD AUTO: 1.13 THOUSANDS/ÂΜL (ref 0.6–4.47)
LYMPHOCYTES NFR BLD AUTO: 15 % (ref 14–44)
MCH RBC QN AUTO: 29.1 PG (ref 26.8–34.3)
MCHC RBC AUTO-ENTMCNC: 32.6 G/DL (ref 31.4–37.4)
MCV RBC AUTO: 89 FL (ref 82–98)
MONOCYTES # BLD AUTO: 0.44 THOUSAND/ÂΜL (ref 0.17–1.22)
MONOCYTES NFR BLD AUTO: 6 % (ref 4–12)
NEUTROPHILS # BLD AUTO: 6.13 THOUSANDS/ÂΜL (ref 1.85–7.62)
NEUTS SEG NFR BLD AUTO: 77 % (ref 43–75)
NRBC BLD AUTO-RTO: 0 /100 WBCS
P AXIS: 56 DEGREES
PLATELET # BLD AUTO: 218 THOUSANDS/UL (ref 149–390)
PMV BLD AUTO: 10.1 FL (ref 8.9–12.7)
POTASSIUM SERPL-SCNC: 3.9 MMOL/L (ref 3.5–5.3)
PR INTERVAL: 148 MS
QRS AXIS: -11 DEGREES
QRSD INTERVAL: 80 MS
QT INTERVAL: 352 MS
QTC INTERVAL: 435 MS
RBC # BLD AUTO: 5.33 MILLION/UL (ref 3.81–5.12)
SODIUM SERPL-SCNC: 140 MMOL/L (ref 135–147)
T WAVE AXIS: -12 DEGREES
VENTRICULAR RATE: 92 BPM
WBC # BLD AUTO: 7.82 THOUSAND/UL (ref 4.31–10.16)

## 2024-01-05 PROCEDURE — 99285 EMERGENCY DEPT VISIT HI MDM: CPT | Performed by: EMERGENCY MEDICINE

## 2024-01-05 PROCEDURE — 93005 ELECTROCARDIOGRAM TRACING: CPT

## 2024-01-05 PROCEDURE — 81025 URINE PREGNANCY TEST: CPT | Performed by: EMERGENCY MEDICINE

## 2024-01-05 PROCEDURE — 99283 EMERGENCY DEPT VISIT LOW MDM: CPT

## 2024-01-05 PROCEDURE — 36415 COLL VENOUS BLD VENIPUNCTURE: CPT | Performed by: EMERGENCY MEDICINE

## 2024-01-05 PROCEDURE — 80048 BASIC METABOLIC PNL TOTAL CA: CPT | Performed by: EMERGENCY MEDICINE

## 2024-01-05 PROCEDURE — 84484 ASSAY OF TROPONIN QUANT: CPT | Performed by: EMERGENCY MEDICINE

## 2024-01-05 PROCEDURE — 85025 COMPLETE CBC W/AUTO DIFF WBC: CPT | Performed by: EMERGENCY MEDICINE

## 2024-01-05 PROCEDURE — 71045 X-RAY EXAM CHEST 1 VIEW: CPT

## 2024-01-05 NOTE — TELEPHONE ENCOUNTER
"Reason for Disposition  • Systolic BP >= 160 OR Diastolic >= 100, and any cardiac or neurologic symptoms (e.g., chest pain, difficulty breathing, unsteady gait, blurred vision)    Answer Assessment - Initial Assessment Questions  1. BLOOD PRESSURE: \"What is the blood pressure?\" \"Did you take at least two measurements 5 minutes apart?\"      176/100 few minutes ago   2. ONSET: \"When did you take your blood pressure?\"      3 days ago   3. HOW: \"How did you obtain the blood pressure?\" (e.g., visiting nurse, automatic home BP monitor)      Automatic   4. HISTORY: \"Do you have a history of high blood pressure?\"      No   5. MEDICATIONS: \"Are you taking any medications for blood pressure?\" \"Have you missed any doses recently?\"      No   6. OTHER SYMPTOMS: \"Do you have any symptoms?\" (e.g., headache, chest pain, blurred vision, difficulty breathing, weakness)      Heartburn, pain in both arms.   7. PREGNANCY: \"Is there any chance you are pregnant?\" \"When was your last menstrual period?\"      No    Protocols used: High Blood Pressure-ADULT-[place]    "

## 2024-01-05 NOTE — TELEPHONE ENCOUNTER
Regarding: BP at 170/100  ----- Message from Josselyn Ledezma MA sent at 1/5/2024  9:59 AM EST -----  Patient calling stating that she does not feel well her BP is up at 170/100. Patient is currently at work. Arm pain but no dizziness  Josselyn Ledezma

## 2024-01-07 NOTE — ED PROVIDER NOTES
"History  Chief Complaint   Patient presents with    Medical Problem     Pt reports \"not feeling right, I'm not having any chest pain or anything like that.\"  Pt went to PCP due to arm pain, and was given aleve.  Pt reports high bp today while at work.       3-year-old female history of diabetes, hyperlipidemia, presents with 1 day of chest discomfort and left arm pain.  No inciting injury.  No associated shortness of breath.        Prior to Admission Medications   Prescriptions Last Dose Informant Patient Reported? Taking?   ARIPiprazole (ABILIFY) 2 mg tablet  Self No No   Sig: Take 1 tablet (2 mg total) by mouth daily At 9am   Blood Glucose Monitoring Suppl (ACCU-CHEK GUIDE) w/Device KIT  Self Yes No   Sig: daily Use as directed   Calcium Carb-Cholecalciferol (Oyster Shell Calcium w/D) 500-200 MG-UNIT TABS No  Self Yes No   Sig: take 2 tablets by mouth once daily with breakfast   Patient not taking: Reported on 1/3/2024   Lancets (ACCU-CHEK SOFT TOUCH) lancets  Self No No   Sig: Use as instructed once daily   benzonatate (TESSALON) 200 MG capsule  Self No No   Sig: Take 1 capsule (200 mg total) by mouth 3 (three) times a day as needed for cough   Patient not taking: Reported on 1/3/2024   busPIRone (BUSPAR) 10 mg tablet   No No   Sig: Take 1 tablet (10 mg total) by mouth 2 (two) times a day At 9am and 6pm   calcium carbonate-vitamin D (OSCAL-D) 500 mg-200 units per tablet  Self No No   Sig: Take 2 tablets by mouth daily with breakfast   cholecalciferol (VITAMIN D3) 1,000 units tablet  Self Yes No   Sig: Take 1,000 Units by mouth daily   metFORMIN (GLUCOPHAGE) 500 mg tablet   No No   Sig: Take 1 tablet (500 mg total) by mouth 2 (two) times a day with meals   naproxen (Naprosyn) 500 mg tablet   No No   Sig: Take 1 tablet (500 mg total) by mouth 2 (two) times a day with meals   sertraline (ZOLOFT) 100 mg tablet   No No   Sig: Take 2 tablets (200 mg total) by mouth daily At 9am   simvastatin (ZOCOR) 10 mg tablet   No " No   Sig: Take 1 tablet (10 mg total) by mouth daily at bedtime      Facility-Administered Medications: None       Past Medical History:   Diagnosis Date    Anemia     Anxiety     Depression     Diabetes (HCC)     Diabetes mellitus (HCC)     Endometrial cancer (HCC) 04/02/2020    Initial path: A. Uterus, Endometrium, Biopsy: - Consistent with atypical polypoid adenomyoma. See Note.   B. Uterus, Cervical Mass, Excision: - Consistent with atypical polypoid adenomyoma. See Note.   C. 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 See Note    Generalized anxiety disorder 03/06/2021    GERD (gastroesophageal reflux disease)     History of transfusion 08/2019-3/14/20    Severe episode of recurrent major depressive disorder, without psychotic features (HCC) 03/06/2021    Syncope     Due to blood loss    Uterine cancer (HCC) 2019       Past Surgical History:   Procedure Laterality Date    HYSTERECTOMY  2019    NO PAST SURGERIES      OOPHORECTOMY  2019    SC LAPS TOTAL HYSTERECT 250 GM/< W/RMVL TUBE/OVARY N/A 4/16/2020    Procedure: ROBOTIC TOTAL LAPAROSCOPIC HYSTERECTOMY, BILATERAL SALPINGO-OOPHORECTOMY, SENTINEL LYMPH NODE BIOPSY;  Surgeon: Leah Bustos MD;  Location: [facility];  Service: Gynecology Oncology       Family History   Problem Relation Age of Onset    Hypertension Mother     No Known Problems Father     Lung cancer Maternal Grandfather     No Known Problems Brother     Cancer Maternal Aunt     No Known Problems Maternal Aunt     No Known Problems Maternal Aunt     No Known Problems Paternal Aunt      I have reviewed and agree with the history as documented.    E-Cigarette/Vaping    E-Cigarette Use Never User      E-Cigarette/Vaping Substances    Nicotine No     THC No     CBD No     Flavoring No     Other No     Unknown No      Social History     Tobacco Use    Smoking status: Never    Smokeless tobacco: Never   Vaping Use    Vaping  status: Never Used   Substance Use Topics    Alcohol use: Not Currently     Comment: couple times per year-holidays    Drug use: No       Review of Systems   All other systems reviewed and are negative.      Physical Exam  Physical Exam  Constitutional:       Appearance: Normal appearance.   HENT:      Mouth/Throat:      Mouth: Mucous membranes are moist.   Cardiovascular:      Rate and Rhythm: Normal rate and regular rhythm.      Heart sounds: Normal heart sounds.   Pulmonary:      Effort: Pulmonary effort is normal.      Breath sounds: Normal breath sounds.   Abdominal:      Palpations: Abdomen is soft.      Tenderness: There is no abdominal tenderness.   Musculoskeletal:         General: Normal range of motion.   Skin:     General: Skin is warm and dry.   Neurological:      General: No focal deficit present.      Mental Status: She is alert and oriented to person, place, and time.   Psychiatric:         Mood and Affect: Mood normal.         Behavior: Behavior normal.         Vital Signs  ED Triage Vitals [01/05/24 1040]   Temperature Pulse Respirations Blood Pressure SpO2   (!) 97.4 °F (36.3 °C) 101 18 158/82 95 %      Temp Source Heart Rate Source Patient Position - Orthostatic VS BP Location FiO2 (%)   Oral Monitor -- -- --      Pain Score       No Pain           Vitals:    01/05/24 1040 01/05/24 1100   BP: 158/82 147/73   Pulse: 101 87         Visual Acuity      ED Medications  Medications - No data to display    Diagnostic Studies  Results Reviewed       Procedure Component Value Units Date/Time    HS Troponin 0hr (reflex protocol) [[id number]]  (Normal) Collected: 01/05/24 1156    Lab Status: Final result Specimen: Blood from Arm, Right Updated: 01/05/24 1225     hs TnI 0hr <2 ng/L     Basic metabolic panel [[id number]] Collected: 01/05/24 1156    Lab Status: Final result Specimen: Blood from Arm, Right Updated: 01/05/24 1216     Sodium 140 mmol/L      Potassium 3.9 mmol/L      Chloride 104 mmol/L      CO2 26  mmol/L      ANION GAP 10 mmol/L      BUN 22 mg/dL      Creatinine 0.75 mg/dL      Glucose 121 mg/dL      Calcium 10.2 mg/dL      eGFR 97 ml/min/1.73sq m     Narrative:      National Kidney Disease Foundation guidelines for Chronic Kidney Disease (CKD):     Stage 1 with normal or high GFR (GFR > 90 mL/min/1.73 square meters)    Stage 2 Mild CKD (GFR = 60-89 mL/min/1.73 square meters)    Stage 3A Moderate CKD (GFR = 45-59 mL/min/1.73 square meters)    Stage 3B Moderate CKD (GFR = 30-44 mL/min/1.73 square meters)    Stage 4 Severe CKD (GFR = 15-29 mL/min/1.73 square meters)    Stage 5 End Stage CKD (GFR <15 mL/min/1.73 square meters)  Note: GFR calculation is accurate only with a steady state creatinine    POCT pregnancy, urine [[id number]]  (Normal) Resulted: 01/05/24 1206    Lab Status: Final result Updated: 01/05/24 1206     EXT Preg Test, Ur Negative     Control Valid    CBC and differential [[id number]]  (Abnormal) Collected: 01/05/24 1156    Lab Status: Final result Specimen: Blood from Arm, Right Updated: 01/05/24 1202     WBC 7.82 Thousand/uL      RBC 5.33 Million/uL      Hemoglobin 15.5 g/dL      Hematocrit 47.6 %      MCV 89 fL      MCH 29.1 pg      MCHC 32.6 g/dL      RDW 13.4 %      MPV 10.1 fL      Platelets 218 Thousands/uL      nRBC 0 /100 WBCs      Neutrophils Relative 77 %      Immat GRANS % 1 %      Lymphocytes Relative 15 %      Monocytes Relative 6 %      Eosinophils Relative 0 %      Basophils Relative 1 %      Neutrophils Absolute 6.13 Thousands/µL      Immature Grans Absolute 0.05 Thousand/uL      Lymphocytes Absolute 1.13 Thousands/µL      Monocytes Absolute 0.44 Thousand/µL      Eosinophils Absolute 0.03 Thousand/µL      Basophils Absolute 0.04 Thousands/µL                    XR chest 1 view portable   Final Result by Kulwant Carranza MD (01/05 1307)      No acute cardiopulmonary disease.                  Workstation performed: [id number]                    Procedures  Procedures         ED  Course       43-year-old female presenting with 1 day of chest discomfort.  On arrival patient is well-appearing with normal vitals.  She has normal cardiopulmonary exam.  Initial EKG shows normal sinus rhythm 92 bpm with some diffuse T wave flattening and no acute ST segment changes to suggest ischemia per my independent interpretation.  Chest x-ray shows no acute disease per my independent interpretation.  Screening labs notable for normal CBC BMP and negative troponin.  Patient with a heart score of 2.  Provided with reassurance.  Stable for discharge.      HEART Risk Score      Flowsheet Row Most Recent Value   Heart Score Risk Calculator    History 0 Filed at: 01/05/2024 1336   ECG 1 Filed at: 01/05/2024 1336   Age 0 Filed at: 01/05/2024 1336   Risk Factors 1 Filed at: 01/05/2024 1336   Troponin 0 Filed at: 01/05/2024 1336   HEART Score 2 Filed at: 01/05/2024 1336                          SBIRT 20yo+      Flowsheet Row Most Recent Value   Initial Alcohol Screen: US AUDIT-C     1. How often do you have a drink containing alcohol? 1 Filed at: 01/05/2024 1041   2. How many drinks containing alcohol do you have on a typical day you are drinking?  0 Filed at: 01/05/2024 1041   3b. FEMALE Any Age, or MALE 65+: How often do you have 4 or more drinks on one occassion? 0 Filed at: 01/05/2024 1041   Audit-C Score 1 Filed at: 01/05/2024 1041   JOSÉ LUIS: How many times in the past year have you...    Used an illegal drug or used a prescription medication for non-medical reasons? Never Filed at: 01/05/2024 1041                      Medical Decision Making  Amount and/or Complexity of Data Reviewed  Labs: ordered.  Radiology: ordered.             Disposition  Final diagnoses:   Chest discomfort     Time reflects when diagnosis was documented in both MDM as applicable and the Disposition within this note       Time User Action Codes Description Comment    1/5/2024  1:37 PM Leticia Campbell Add [R07.89] Chest discomfort           ED  Disposition       ED Disposition   Discharge    Condition   Stable    Date/Time   Fri Jan 5, 2024 1337    Comment   Frannie Jj discharge to home/self care.                   Follow-up Information       Follow up With Specialties Details Why Contact Info    Jodi Britt MD Family Medicine Schedule an appointment as soon as possible for a visit  As needed [address] [place]  [place] [address]  [phone]              Discharge Medication List as of 1/5/2024  1:37 PM        CONTINUE these medications which have NOT CHANGED    Details   ARIPiprazole (ABILIFY) 2 mg tablet Take 1 tablet (2 mg total) by mouth daily At 9am, Starting Sat 3/13/2021, Normal      benzonatate (TESSALON) 200 MG capsule Take 1 capsule (200 mg total) by mouth 3 (three) times a day as needed for cough, Starting Tue 8/1/2023, Normal      Blood Glucose Monitoring Suppl (ACCU-CHEK GUIDE) w/Device KIT daily Use as directed, Starting Wed 11/6/2019, Historical Med      busPIRone (BUSPAR) 10 mg tablet Take 1 tablet (10 mg total) by mouth 2 (two) times a day At 9am and 6pm, Starting Thu 9/21/2023, Normal      Calcium Carb-Cholecalciferol (Oyster Shell Calcium w/D) 500-200 MG-UNIT TABS No take 2 tablets by mouth once daily with breakfast, Historical Med      calcium carbonate-vitamin D (OSCAL-D) 500 mg-200 units per tablet Take 2 tablets by mouth daily with breakfast, Starting Mon 8/22/2022, Normal      cholecalciferol (VITAMIN D3) 1,000 units tablet Take 1,000 Units by mouth daily, Historical Med      Lancets (ACCU-CHEK SOFT TOUCH) lancets Use as instructed once daily, Normal      metFORMIN (GLUCOPHAGE) 500 mg tablet Take 1 tablet (500 mg total) by mouth 2 (two) times a day with meals, Starting Thu 12/21/2023, Normal      naproxen (Naprosyn) 500 mg tablet Take 1 tablet (500 mg total) by mouth 2 (two) times a day with meals, Starting Wed 1/3/2024, Normal      sertraline (ZOLOFT) 100 mg tablet Take 2 tablets (200 mg total) by mouth daily At 9am,  Starting Mon 9/25/2023, Normal      simvastatin (ZOCOR) 10 mg tablet Take 1 tablet (10 mg total) by mouth daily at bedtime, Starting Thu 9/28/2023, Normal             No discharge procedures on file.    PDMP Review         Value Time User    PDMP Reviewed  Yes 4/16/2020  6:57 AM Leah Bustos MD            ED Provider  Electronically Signed by             Leticia Campbell MD  01/07/24 0746

## 2024-01-08 ENCOUNTER — VBI (OUTPATIENT)
Dept: FAMILY MEDICINE CLINIC | Facility: CLINIC | Age: 44
End: 2024-01-08

## 2024-01-08 ENCOUNTER — PATIENT OUTREACH (OUTPATIENT)
Dept: FAMILY MEDICINE CLINIC | Facility: CLINIC | Age: 44
End: 2024-01-08

## 2024-01-08 DIAGNOSIS — Z71.89 COORDINATION OF COMPLEX CARE: Primary | ICD-10-CM

## 2024-01-08 NOTE — PROGRESS NOTES
Received referral from VB team via in basket message. Chart reviewed. Outreach call to be scheduled.

## 2024-01-08 NOTE — TELEPHONE ENCOUNTER
01/08/24 11:50 AM    Patient contacted post ED visit, [facility] department spoke with patient/caregiver and outreach was successful.    Thank you.  Chula Rasheed MA  [facility]

## 2024-01-09 ENCOUNTER — PATIENT OUTREACH (OUTPATIENT)
Dept: FAMILY MEDICINE CLINIC | Facility: CLINIC | Age: 44
End: 2024-01-09

## 2024-01-09 NOTE — PROGRESS NOTES
Call to patient, introduced self and role of complex care management.  Pt verified she comprehends her hospital discharge instructions. She had no changes in medications.  Pt independent in scheduling her own appointments.   She will call to schedule an ED follow up.Declines further outreach at this time. Provided my contact information if future assistance is needed.  Episode closed.

## 2024-03-20 DIAGNOSIS — E78.2 MIXED HYPERLIPIDEMIA: ICD-10-CM

## 2024-03-20 RX ORDER — SIMVASTATIN 10 MG
10 TABLET ORAL
Qty: 90 TABLET | Refills: 1 | Status: SHIPPED | OUTPATIENT
Start: 2024-03-20

## 2024-06-06 ENCOUNTER — TELEPHONE (OUTPATIENT)
Age: 44
End: 2024-06-06

## 2024-06-06 ENCOUNTER — TELEPHONE (OUTPATIENT)
Dept: PSYCHIATRY | Facility: CLINIC | Age: 44
End: 2024-06-06

## 2024-06-06 DIAGNOSIS — F33.2 SEVERE EPISODE OF RECURRENT MAJOR DEPRESSIVE DISORDER, WITHOUT PSYCHOTIC FEATURES (HCC): ICD-10-CM

## 2024-06-06 DIAGNOSIS — F41.1 GENERALIZED ANXIETY DISORDER: Primary | ICD-10-CM

## 2024-06-06 NOTE — TELEPHONE ENCOUNTER
The patient called she would a  doctor referral to [facility]  please call patient when it is done so she can make her appointment thank you

## 2024-06-06 NOTE — TELEPHONE ENCOUNTER
Patient has been added to the Medication Management and Talk Therapy wait list without a referral.    Insurance: [employer]   Insurance Type:    Commercial [x]   Medicaid []   [facility] (if applicable)   Medicare []  Location Preference: [place]  Provider Preference: none  Virtual: Yes [] No [x]  Were outside resources sent: Yes [] No [x]  Advised the patient to contact her PCP for a referral.     Presenting Problem   Anxiety   Depression

## 2024-06-16 DIAGNOSIS — E11.9 TYPE 2 DIABETES MELLITUS WITHOUT COMPLICATION, WITHOUT LONG-TERM CURRENT USE OF INSULIN (HCC): ICD-10-CM

## 2024-07-09 ENCOUNTER — TELEPHONE (OUTPATIENT)
Age: 44
End: 2024-07-09

## 2024-07-09 DIAGNOSIS — F32.A DEPRESSION, UNSPECIFIED DEPRESSION TYPE: ICD-10-CM

## 2024-07-09 RX ORDER — SERTRALINE HYDROCHLORIDE 100 MG/1
200 TABLET, FILM COATED ORAL DAILY
Qty: 60 TABLET | Refills: 0 | Status: SHIPPED | OUTPATIENT
Start: 2024-07-09

## 2024-07-09 NOTE — TELEPHONE ENCOUNTER
Pt was calling in to see if Dr. Zapata can call in her Sertraline 200 mg, she takes 2 tablets per day once a day. Pt stated her Psychiatrist office closed down and currently has an appt scheduled with a new Psychiatrist but wasn't able to get an appt until 8/14. Pt is asking if Dr. Zapata can do a courtesy refill until she is able to see her new Psychiatrist. Pt would like this prescription sent to the [facility] on [address] [place] [id number]. Please advise with the pt if needed thank you.

## 2024-08-04 DIAGNOSIS — F32.A DEPRESSION, UNSPECIFIED DEPRESSION TYPE: ICD-10-CM

## 2024-08-05 RX ORDER — SERTRALINE HYDROCHLORIDE 100 MG/1
TABLET, FILM COATED ORAL
Qty: 60 TABLET | Refills: 5 | Status: SHIPPED | OUTPATIENT
Start: 2024-08-05

## 2024-09-16 ENCOUNTER — TELEPHONE (OUTPATIENT)
Dept: HEMATOLOGY ONCOLOGY | Facility: CLINIC | Age: 44
End: 2024-09-16

## 2024-09-26 DIAGNOSIS — E78.2 MIXED HYPERLIPIDEMIA: ICD-10-CM

## 2024-09-26 RX ORDER — SIMVASTATIN 10 MG
10 TABLET ORAL
Qty: 90 TABLET | Refills: 0 | Status: SHIPPED | OUTPATIENT
Start: 2024-09-26

## 2024-09-26 NOTE — TELEPHONE ENCOUNTER
Reason for call:   [x] Refill   [] Prior Auth  [] Other:     Office:   [x] PCP/Provider -   [] Specialty/Provider -     Medication: Simvastatin     Dose/Frequency: 10mg / 1 tablet daily at bedtime    Quantity: 90    Pharmacy:   [facility]  NADEGE Garcia [address] [phone]     Does the patient have enough for 3 days?   [] Yes   [x] No - Send as HP to POD

## 2024-10-04 ENCOUNTER — TELEPHONE (OUTPATIENT)
Age: 44
End: 2024-10-04

## 2024-10-15 ENCOUNTER — TRANSCRIBE ORDERS (OUTPATIENT)
Dept: LAB | Facility: CLINIC | Age: 44
End: 2024-10-15

## 2024-10-15 DIAGNOSIS — Z79.899 NEED FOR PROPHYLACTIC CHEMOTHERAPY: Primary | ICD-10-CM

## 2024-10-15 LAB — HBA1C MFR BLD HPLC: 6.1 %

## 2024-12-12 DIAGNOSIS — E11.9 TYPE 2 DIABETES MELLITUS WITHOUT COMPLICATION, WITHOUT LONG-TERM CURRENT USE OF INSULIN (HCC): ICD-10-CM

## 2024-12-26 DIAGNOSIS — E78.2 MIXED HYPERLIPIDEMIA: ICD-10-CM

## 2024-12-26 RX ORDER — SIMVASTATIN 10 MG
10 TABLET ORAL
Qty: 90 TABLET | Refills: 0 | Status: SHIPPED | OUTPATIENT
Start: 2024-12-26

## 2024-12-26 NOTE — TELEPHONE ENCOUNTER
Reason for call:   [x] Refill   [] Prior Auth  [] Other:     Office:   [x] PCP/Provider - Jodi Britt MD  [] Specialty/Provider -     Medication: simvastatin (ZOCOR) 10 mg tablet     Dose/Frequency: 10 mg, Oral, Daily at bedtime     Quantity: 90    Pharmacy: [address]     Does the patient have enough for 3 days?   [] Yes   [x] No - Send as HP to POD

## 2025-01-02 ENCOUNTER — TELEPHONE (OUTPATIENT)
Age: 45
End: 2025-01-02

## 2025-01-02 NOTE — TELEPHONE ENCOUNTER
"Behavioral Health Outpatient Intake Questions    Referred By   : PCP     Please advise interviewee that they need to answer all questions truthfully to allow for best care, and any misrepresentations of information may affect their ability to be seen at this clinic   => Was this discussed? Yes     If Minor Child (under age 18)    Who is/are the legal guardian(s) of the child?     Is there a custody agreement? No     If \"YES\"- Custody orders must be obtained prior to scheduling the first appointment  In addition, Consent to Treatment must be signed by all legal guardians prior to scheduling the first appointment    If \"NO\"- Consent to Treatment must be signed by all legal guardians prior to scheduling the first appointment    Behavioral Health Outpatient Intake History -     Presenting Problem (in patient's own words):       Pt stated that she has depression and anxiety.     Are there any communication barriers for this patient?     No                                               If yes, please describe barriers:  NONE   If there is a unique situation, please refer to Scott Duncan/Nilsa Armstrong for final determination.    Are you taking any psychiatric medications? Yes     If \"YES\" -What are they Zolofpatricia Buspar     If \"YES\" -Who prescribes? Current psych     Has the Patient previously received outpatient Talk Therapy or Medication Management from [facility]  No        If \"YES\"- When, Where and with Whom?         If \"NO\" -Has Patient received these services elsewhere?       If \"YES\" -When, Where, and with Whom?    Has the Patient abused alcohol or other substances in the last 6 months ? No  No concerns of substance abuse are reported.     If \"YES\" -What substance, How much, How often?     If illegal substance: Refer to [facility] (for LENNY) or [facility].   If Alcohol in excess of 10 drinks per week:  Refer to [facility] (for [facility]) or [facility]    Legal History-     Is this treatment court ordered? " "No   If \"yes \"send to :  Talk Therapy : Send to Scott Ducnan for final determination   Med Management: Send to Dr. Mcguire for final determination     Has the Patient been convicted of a felony?  No   If \"Yes\" send to -When, What?  Talk Therapy: Send to Scott Duncan for final determination   Med Management: Send to Dr. Mcguire for final determination     ACCEPTED as a patient Yes  If \"Yes\" Appointment Date: 1/7/2025- Medication  4/1/2025- Therapy     Referred Elsewhere? No  If “Yes” - (Where? Ex: [facility], [facility], [facility], [facility], etc.)       Name of Insurance Co:[facility] Blue [employer]   Insurance ID#[id number]  Insurance Phone #  If ins is primary or secondary?Primary  If patient is a minor, parents information such as Name, D.O.B of guarantor.  "

## 2025-01-06 ENCOUNTER — TELEPHONE (OUTPATIENT)
Dept: PSYCHIATRY | Facility: CLINIC | Age: 45
End: 2025-01-06

## 2025-01-06 NOTE — TELEPHONE ENCOUNTER
Spoke with Frannie regarding all the consents need in order to have a virtual appt. Pt said she will do them today 1/6/2025

## 2025-01-07 ENCOUNTER — TELEMEDICINE (OUTPATIENT)
Dept: PSYCHIATRY | Facility: CLINIC | Age: 45
End: 2025-01-07
Payer: COMMERCIAL

## 2025-01-07 DIAGNOSIS — F33.42 RECURRENT MAJOR DEPRESSIVE DISORDER, IN FULL REMISSION (HCC): ICD-10-CM

## 2025-01-07 DIAGNOSIS — F32.A DEPRESSION, UNSPECIFIED DEPRESSION TYPE: ICD-10-CM

## 2025-01-07 DIAGNOSIS — F41.1 GENERALIZED ANXIETY DISORDER: ICD-10-CM

## 2025-01-07 PROCEDURE — 90792 PSYCH DIAG EVAL W/MED SRVCS: CPT | Performed by: PSYCHIATRY & NEUROLOGY

## 2025-01-07 RX ORDER — ARIPIPRAZOLE 2 MG/1
2 TABLET ORAL DAILY
Qty: 90 TABLET | Refills: 1 | Status: SHIPPED | OUTPATIENT
Start: 2025-01-07

## 2025-01-07 RX ORDER — SERTRALINE HYDROCHLORIDE 100 MG/1
200 TABLET, FILM COATED ORAL DAILY
Qty: 180 TABLET | Refills: 1 | Status: SHIPPED | OUTPATIENT
Start: 2025-01-07

## 2025-01-07 RX ORDER — BUSPIRONE HYDROCHLORIDE 10 MG/1
10 TABLET ORAL 2 TIMES DAILY
Qty: 180 TABLET | Refills: 1 | Status: SHIPPED | OUTPATIENT
Start: 2025-01-07

## 2025-01-07 NOTE — BH TREATMENT PLAN
TREATMENT PLAN (Medication Management Only)        [facility]    Name and Date of Birth:  Frannie Jj 44 y.o. 1980  Date of Treatment Plan: January 7, 2025  Diagnosis/Diagnoses:    1. Recurrent major depressive disorder, in full remission (HCC)    2. Generalized anxiety disorder    3. Depression, unspecified depression type      Strengths/Personal Resources for Self-Care: supportive family, taking medications as prescribed, ability to understand psychiatric illness, average or above intelligence, good physical health, independence, stable employment.  Area/Areas of need (in own words): anxiety, depression  1. Long Term Goal: maintain improvement in depression.  Target Date:6 months - 7/7/2025  Person/Persons responsible for completion of goal: Frannie  2.  Short Term Objective (s) - How will we reach this goal?:   A. Provider new recommended medication/dosage changes and/or continue medication(s): continue current medications as prescribed.  B. Attend psychotherapy regularly.  C. Attend medication management appointments regularly.  Target Date:3 months - 4/7/2025  Person/Persons Responsible for Completion of Goal: Frannie  Progress Towards Goals: starting treatment  Treatment Modality: medication management every 3 months  Review due 180 days from date of this plan: 3 months - 4/7/2025  Expected length of service: maintenance  My Physician/PA/NP and I have developed this plan together and I agree to work on the goals and objectives. I understand the treatment goals that were developed for my treatment.

## 2025-01-07 NOTE — PSYCH
Virtual Regular Visit    Verification of patient location:    Patient is located at Home in the following state in which I hold an active license [place]      Assessment/Plan:    Problem List Items Addressed This Visit          Behavioral Health    Depression    Relevant Medications    ARIPiprazole (ABILIFY) 2 mg tablet    busPIRone (BUSPAR) 10 mg tablet    sertraline (ZOLOFT) 100 mg tablet    Severe episode of recurrent major depressive disorder, without psychotic features (HCC)    Relevant Medications    ARIPiprazole (ABILIFY) 2 mg tablet    busPIRone (BUSPAR) 10 mg tablet    sertraline (ZOLOFT) 100 mg tablet    Generalized anxiety disorder    Relevant Medications    ARIPiprazole (ABILIFY) 2 mg tablet    busPIRone (BUSPAR) 10 mg tablet    sertraline (ZOLOFT) 100 mg tablet       Goals addressed in session: Goal 1     Depression Follow-up Plan Completed: Not applicable    Reason for visit is   Chief Complaint   Patient presents with    Virtual Regular Visit    Anxiety    Depression    Medication Management          Encounter provider Horacio Fontaine MD      Recent Visits  Date Type Provider Dept   01/06/25 [facility]phone [facility]   Showing recent visits within past 7 days and meeting all other requirements  Today's Visits  Date Type Provider Dept   01/07/25 Telemedicine [facility]aine [facility]   Showing today's visits and meeting all other requirements  Future Appointments  No visits were found meeting these conditions.  Showing future appointments within next 150 days and meeting all other requirements       The patient was identified by name and date of birth. Frannie Jj was informed that this is a telemedicine visit and that the visit is being conducted throughthe Epic Embedded platform. She agrees to proceed..  My office door was closed. No one else was in the room.  She acknowledged consent and understanding of privacy and security of the video  platform. The patient has agreed to participate and understands they can discontinue the visit at any time.    Patient is aware this is a billable service.     PSYCHIATRIC EVALUATION     [facility]    Horacio Fontaine MD is located at remote work location in [place]    Name and Date of Birth:  Frannie Jj 44 y.o. 1980 MRN: [id number]    Date of Visit: January 7, 2025    Reason for visit:   Chief Complaint   Patient presents with    Virtual Regular Visit    Anxiety    Depression    Medication Management     HPI     Frannie is a 44 y.o. female with a history of Major Depressive Disorder and Generalized Anxiety Disorder who presents for psychiatric evaluation due to depressive symptoms, anxiety symptoms, and for psychiatric medication management.    Primary complaints include  - see below . Symptoms first started gradually few years ago and improved and remained stable over the last 3 and 1/2 years. Stressors preceding visit included no significant stressors.     Frannie reports she has a history of anxiety and depression. She reports she was last seeing Dr Murphy Montenegro since around 2021 after her last discharge from [facility]. She primarily wanted to switch to [facility] to get therapy as her last therapist left her practice and wanted all her mental health services under one umbrella. So when was offered an intake after being on wait list for some time she decided to make the switch. She has been stable on her current regimen since her last discharge.   Depression symptoms - currently no symptoms, depression in remission; in past episodes - mood was depressed and anxious; made it hard to function and work; increased sleep; appetite was normal but will eat less if anxious; low interest, low motivation, low energy; +hopelessness/helplessness; past suicidal thoughts; denies HI in the past; past passive death wishes.  Denies current SI/HI /passive death wishes  Anxiety  Symptoms - none currently ; in the past overwhelming racing thoughts and constant worrying; sometimes nauseous but no panic attacks  Michelle - denies   PTSD screening - denies  Psychotic symptoms screening - denies AH/VH, delusions, IOR    Guns at home? no    She denies suicidal ideation, intent or plan at present, denies homicidal ideation, intent or plan at present.    She denies auditory hallucinations, denies visual hallucinations, denies delusions.    She denies any side effects from medications.  .  HPI ROS Appetite Changes and Sleep: normal sleep, normal appetite, normal energy level    Psychiatric Review Of Systems:    Sleep changes: no  Appetite changes: no  Weight changes: no  Energy/anergy: no  Interest/pleasure/anhedonia: no  Somatic symptoms: no  Anxiety/panic: no  Michelle: no  Guilty/hopeless: no  Self injurious behavior/risky behavior: no  Suicidal ideation: no  Homicidal ideation: no  Auditory hallucinations: no  Visual hallucinations: no  Other hallucinations: no  Delusional thinking: no  Eating disorder history: no  Obsessive/compulsive symptoms: no    Review Of Systems:    Mood euthymic   Behavior normal , appropriate, and cooperative   Thought Content normal   General normal    Personality normal   Other Psych Symptoms normal   Constitutional negative   ENT negative   Cardiovascular negative   Respiratory negative   Gastrointestinal negative   Genitourinary negative   Musculoskeletal negative   Integumentary negative   Neurological negative   Endocrine negative   Other Symptoms none       Past Psychiatric History:     Past Inpatient Psychiatric Treatment:   Multiple past inpatient psychiatric admissions  Twice -- 2013 in [facility] for suicide attempt by overdose and 2021 in [facility] for SI  Past Outpatient Psychiatric Treatment:    Past outpatient psychiatric treatment - Dr Murphy Montenegro  - Therapy with Glenna TINOCO (last seen few months ago) but left her practice  - [facility] - med mgmt  and therapy  Past Suicide Attempts: yes, tried to overdose on zoloft   Past Violent Behavior: no  Past Psychiatric Medication Trials: Prozac    Traumatic History:     Abuse: none  Other Traumatic Events: none     Past Medical History:    Past Medical History:   Diagnosis Date    Anemia     Anxiety     Depression     Diabetes (HCC)     Diabetes mellitus (HCC)     Endometrial cancer (HCC) 04/02/2020    Initial path: A. Uterus, Endometrium, Biopsy: - Consistent with atypical polypoid adenomyoma. See Note.   B. Uterus, Cervical Mass, Excision: - Consistent with atypical polypoid adenomyoma. See Note.   C. 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 See Note    Generalized anxiety disorder 03/06/2021    GERD (gastroesophageal reflux disease)     History of transfusion 08/2019-3/14/20    Severe episode of recurrent major depressive disorder, without psychotic features (HCC) 03/06/2021    Syncope     Due to blood loss    Uterine cancer (HCC) 2019        Past Surgical History:   Procedure Laterality Date    HYSTERECTOMY  2019    NO PAST SURGERIES      OOPHORECTOMY  2019    OH LAPS TOTAL HYSTERECT 250 GM/< W/RMVL TUBE/OVARY N/A 4/16/2020    Procedure: ROBOTIC TOTAL LAPAROSCOPIC HYSTERECTOMY, BILATERAL SALPINGO-OOPHORECTOMY, SENTINEL LYMPH NODE BIOPSY;  Surgeon: Leah Bustos MD;  Location: [facility];  Service: Gynecology Oncology     No Known Allergies    H/O Head injuries: no  H/O seizures: no    Family Psychiatric History:     Family History   Problem Relation Age of Onset    Hypertension Mother     Bipolar disorder Father     No Known Problems Brother     Cancer Maternal Aunt     No Known Problems Maternal Aunt     No Known Problems Maternal Aunt     No Known Problems Paternal Aunt     Lung cancer Maternal Grandfather        Substance Use History:  No past or current use of drugs   Use of Alcohol: minimal use how often 1-2 times a year, 1 drink  at a time     Longest clean time: n/a  History of IP/OP rehabilitation program: n/a  Smoking history: non-smoker or tobacco use  Use of Caffeine: coffee 1 cup /day  Social History     Substance and Sexual Activity   Alcohol Use Not Currently    Comment: couple times per year-holidays     Social History     Substance and Sexual Activity   Drug Use No       Social History:  Education: some college  Learning Disabilities:  none  Marital history: single  Living arrangement, social support: The patient lives in home with mother and brother.  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  Other Pertinent History:  No legal or  history  Social History     Socioeconomic History    Marital status: Single     Spouse name: Not on file    Number of children: 0    Years of education: Not on file    Highest education level: Some college, no degree   Occupational History    Occupation: Day care worker   Tobacco Use    Smoking status: Never    Smokeless tobacco: Never   Vaping Use    Vaping status: Never Used   Substance and Sexual Activity    Alcohol use: Not Currently     Comment: couple times per year-holidays    Drug use: No    Sexual activity: Never   Other Topics Concern    Not on file   Social History Narrative    Not on file     Social Drivers of Health     Financial Resource Strain: Not on file   Food Insecurity: Not on file   Transportation Needs: Not on file   Physical Activity: Not on file   Stress: Not on file   Social Connections: Not on file   Intimate Partner Violence: Not on file   Housing Stability: Not on file         History Review:    The following portions of the patient's history were reviewed and updated as appropriate: allergies, current medications, past family history, past medical history, past social history, past surgical history, and problem list.    OBJECTIVE:    Vital signs in last 24  hours:    There were no vitals filed for this visit.    Mental Status Evaluation:    Appearance age appropriate, casually dressed, dressed appropriately   Behavior pleasant, cooperative, calm   Speech normal rate, normal volume, normal pitch   Mood normal   Affect normal range and intensity, appropriate   Thought Processes organized, logical, coherent, goal directed   Associations intact associations   Thought Content no overt delusions   Perceptual Disturbances: no auditory hallucinations, no visual hallucinations   Abnormal Thoughts  Risk Potential Suicidal ideation - None  Homicidal ideation - None  Potential for aggression - No   Orientation oriented to person, place, time/date, and situation   Memory recent and remote memory grossly intact   Consciousness alert and awake   Attention Span Concentration Span attention span and concentration are age appropriate   Intellect appears to be of average intelligence   Insight intact   Judgement intact   Muscle Strength and  Gait unable to assess today due to virtual visit   Motor Activity no abnormal movements   Language no difficulty naming common objects, no difficulty repeating a phrase   Fund of Knowledge adequate knowledge of current events  adequate fund of knowledge regarding past history  adequate fund of knowledge regarding vocabulary    Pain none   Pain Scale 0       Laboratory Results: Recent Labs:   No visits with results within 1 Month(s) from this visit.   Latest known visit with results is:   Admission on 01/05/2024, Discharged on 01/05/2024   Component Date Value    WBC 01/05/2024 7.82     RBC 01/05/2024 5.33 (H)     Hemoglobin 01/05/2024 15.5 (H)     Hematocrit 01/05/2024 47.6 (H)     MCV 01/05/2024 89     MCH 01/05/2024 29.1     MCHC 01/05/2024 32.6     RDW 01/05/2024 13.4     MPV 01/05/2024 10.1     Platelets 01/05/2024 218     nRBC 01/05/2024 0     Segmented % 01/05/2024 77 (H)     Immature Grans % 01/05/2024 1     Lymphocytes % 01/05/2024 15      Monocytes % 01/05/2024 6     Eosinophils Relative 01/05/2024 0     Basophils Relative 01/05/2024 1     Absolute Neutrophils 01/05/2024 6.13     Absolute Immature Grans 01/05/2024 0.05     Absolute Lymphocytes 01/05/2024 1.13     Absolute Monocytes 01/05/2024 0.44     Eosinophils Absolute 01/05/2024 0.03     Basophils Absolute 01/05/2024 0.04     Sodium 01/05/2024 140     Potassium 01/05/2024 3.9     Chloride 01/05/2024 104     CO2 01/05/2024 26     ANION GAP 01/05/2024 10     BUN 01/05/2024 22     Creatinine 01/05/2024 0.75     Glucose 01/05/2024 121     Calcium 01/05/2024 10.2     eGFR 01/05/2024 97     hs TnI 0hr 01/05/2024 <2     EXT Preg Test, Ur 01/05/2024 Negative     Control 01/05/2024 Valid     Ventricular Rate 01/05/2024 92     Atrial Rate 01/05/2024 92     NJ Interval 01/05/2024 148     QRSD Interval 01/05/2024 80     QT Interval 01/05/2024 352     QTC Interval 01/05/2024 435     P Axis 01/05/2024 56     QRS Axis 01/05/2024 -11     T Wave Axis 01/05/2024 -12        Assessment/Plan:      Diagnoses and all orders for this visit:    Recurrent major depressive disorder, in full remission (HCC)  -     ARIPiprazole (ABILIFY) 2 mg tablet; Take 1 tablet (2 mg total) by mouth daily At 9am    Generalized anxiety disorder  -     busPIRone (BUSPAR) 10 mg tablet; Take 1 tablet (10 mg total) by mouth 2 (two) times a day At 9am and 6pm    Depression, unspecified depression type  -     sertraline (ZOLOFT) 100 mg tablet; Take 2 tablets (200 mg total) by mouth daily          Treatment Recommendations:    Continue Zoloft 200 mg daily to maintain improvement in anxiety and maintain improvement in depression  Continue Buspar 10 mg twice a day to control anxiety symptoms and zoloft augmentation  Continue Abilify 2mg PO daily for zoloft augmentation  Aware of 24 hour and weekend coverage for urgent situations accessed by calling [facility] main practice number  Medication management every 3 months  Will  start individual therapy with own therapist    Risks/Benefits/Precautions:      Risks, Benefits And Possible Side Effects Of Medications:    Risks, benefits, and possible side effects of medications explained to Frannie and she verbalizes understanding and agreement for treatment.    Controlled Medication Discussion:     No records found for controlled prescriptions according to [place] Prescription Drug Monitoring Program    Treatment Plan;    Completed and signed during the session: Yes - Treatment Plan done but not signed at time of office visit due to:  Plan reviewed by video and verbal consent given due to virtual visit.    Horacio Fontaine MD 01/07/25    Visit Time    Visit Start Time: 10:00am  Visit Stop Time: 10:35am  Total Visit Duration:  35 minutes

## 2025-01-08 DIAGNOSIS — E11.9 TYPE 2 DIABETES MELLITUS WITHOUT COMPLICATION, WITHOUT LONG-TERM CURRENT USE OF INSULIN (HCC): ICD-10-CM

## 2025-01-13 DIAGNOSIS — E11.9 TYPE 2 DIABETES MELLITUS WITHOUT COMPLICATION, WITHOUT LONG-TERM CURRENT USE OF INSULIN (HCC): ICD-10-CM

## 2025-01-13 NOTE — TELEPHONE ENCOUNTER
Reason for call:   [x] Refill   [] Prior Auth  [] Other:     Office:   [x] PCP/Provider -  Jodi Britt MD   [] Specialty/Provider -     Medication:  metFORMIN (GLUCOPHAGE) 500 mg tablet    Dose/Frequency:  TAKE ONE TABLET BY MOUTH TWICE A DAY WITH MEALS,     Quantity: 60    Pharmacy: [address]     Does the patient have enough for 3 days?   [] Yes   [x] No - Send as HP to POD

## 2025-02-04 ENCOUNTER — TELEMEDICINE (OUTPATIENT)
Dept: PSYCHIATRY | Facility: CLINIC | Age: 45
End: 2025-02-04
Payer: COMMERCIAL

## 2025-02-04 DIAGNOSIS — F41.1 GENERALIZED ANXIETY DISORDER: ICD-10-CM

## 2025-02-04 DIAGNOSIS — F33.42 RECURRENT MAJOR DEPRESSIVE DISORDER, IN FULL REMISSION (HCC): Primary | ICD-10-CM

## 2025-02-04 PROCEDURE — 99212 OFFICE O/P EST SF 10 MIN: CPT | Performed by: PSYCHIATRY & NEUROLOGY

## 2025-02-04 NOTE — PSYCH
Virtual Regular Visit    Verification of patient location:    Patient is located at Home in the following state in which I hold an active license [place]  Provider located in remote work location in [place]      Assessment/Plan:    Problem List Items Addressed This Visit          Behavioral Health    Depression - Primary    Generalized anxiety disorder       Goals addressed in session: Goal 1     Depression Follow-up Plan Completed: Not applicable    Reason for visit is   Chief Complaint   Patient presents with    Virtual Regular Visit    Depression    Anxiety    Follow-up          Encounter provider Horacio Fontaine MD      Recent Visits  No visits were found meeting these conditions.  Showing recent visits within past 7 days and meeting all other requirements  Today's Visits  Date Type Provider Dept   02/04/25 Telemedicine Horacio Fontaine MD [facility]   Showing today's visits and meeting all other requirements  Future Appointments  No visits were found meeting these conditions.  Showing future appointments within next 150 days and meeting all other requirements       The patient was identified by name and date of birth. Frannie Jj was informed that this is a telemedicine visit and that the visit is being conducted throughthe Epic Embedded platform. She agrees to proceed..  My office door was closed. No one else was in the room.  She acknowledged consent and understanding of privacy and security of the video platform. The patient has agreed to participate and understands they can discontinue the visit at any time.    Patient is aware this is a billable service.     Subjective  Frannie Jj is a 44 y.o. female presents for check in about her medications. She reports still feeling stable on her regimen as she has been for some time now. She denies side effects or any other issues with sleep or appetite. She denies any new stressors or concerns since last appointment. Given her stability on these  meds and her therapy appointment being scheduled for April, we discussed follow up med management every 3 months which she felt ok with.       HPI     Past Medical History:   Diagnosis Date    Anemia     Anxiety     Depression     Diabetes (HCC)     Diabetes mellitus (HCC)     Endometrial cancer (HCC) 04/02/2020    Initial path: A. Uterus, Endometrium, Biopsy: - Consistent with atypical polypoid adenomyoma. See Note.   B. Uterus, Cervical Mass, Excision: - Consistent with atypical polypoid adenomyoma. See Note.   C. 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 See Note    Generalized anxiety disorder 03/06/2021    GERD (gastroesophageal reflux disease)     History of transfusion 08/2019-3/14/20    Severe episode of recurrent major depressive disorder, without psychotic features (HCC) 03/06/2021    Syncope     Due to blood loss    Uterine cancer (HCC) 2019       Past Surgical History:   Procedure Laterality Date    HYSTERECTOMY  2019    NO PAST SURGERIES      OOPHORECTOMY  2019    [place] LAPS TOTAL HYSTERECT 250 GM/< W/RMVL TUBE/OVARY N/A 4/16/2020    Procedure: ROBOTIC TOTAL LAPAROSCOPIC HYSTERECTOMY, BILATERAL SALPINGO-OOPHORECTOMY, SENTINEL LYMPH NODE BIOPSY;  Surgeon: Leah Bustos MD;  Location: [facility];  Service: Gynecology Oncology       Current Outpatient Medications   Medication Sig Dispense Refill    ARIPiprazole (ABILIFY) 2 mg tablet Take 1 tablet (2 mg total) by mouth daily At 9am 90 tablet 1    Blood Glucose Monitoring Suppl (ACCU-CHEK GUIDE) w/Device KIT daily Use as directed  0    busPIRone (BUSPAR) 10 mg tablet Take 1 tablet (10 mg total) by mouth 2 (two) times a day At 9am and 6pm 180 tablet 1    cholecalciferol (VITAMIN D3) 1,000 units tablet Take 1,000 Units by mouth daily      Lancets (ACCU-CHEK SOFT TOUCH) lancets Use as instructed once daily 100 each 1    metFORMIN (GLUCOPHAGE) 500 mg tablet Take 1 tablet (500 mg total)  by mouth 2 (two) times a day with meals 60 tablet 0    sertraline (ZOLOFT) 100 mg tablet Take 2 tablets (200 mg total) by mouth daily 180 tablet 1    simvastatin (ZOCOR) 10 mg tablet Take 1 tablet (10 mg total) by mouth daily at bedtime 90 tablet 0     No current facility-administered medications for this visit.        No Known Allergies    Review of Systems   All other systems reviewed and are negative.      Video Exam    There were no vitals filed for this visit.    Physical Exam   Mental Status Evaluation:    Appearance age appropriate, casually dressed, dressed appropriately   Behavior pleasant, cooperative, calm   Speech normal rate, normal volume, normal pitch   Mood normal   Affect normal range and intensity, appropriate   Thought Processes organized, logical, coherent, goal directed   Associations intact associations   Thought Content no overt delusions   Perceptual Disturbances: no auditory hallucinations, no visual hallucinations   Abnormal Thoughts  Risk Potential Suicidal ideation - None  Homicidal ideation - None  Potential for aggression - No   Orientation oriented to person, place, time/date, and situation   Memory recent and remote memory grossly intact   Consciousness alert and awake   Attention Span Concentration Span attention span and concentration are age appropriate   Intellect appears to be of average intelligence   Insight intact   Judgement intact   Muscle Strength and  Gait unable to assess today due to virtual visit   Motor activity no abnormal movements   Language no difficulty naming common objects, no difficulty repeating a phrase   Fund of Knowledge adequate knowledge of current events  adequate fund of knowledge regarding past history  adequate fund of knowledge regarding vocabulary    Pain none   Pain Scale 0     A/P: Frannie continues to feel stable with her current medications. She agrees to 3 month follow ups. She has therapy intake scheduled for April.    Continue Zoloft 200 mg  daily to maintain improvement in anxiety and maintain improvement in depression  Continue Buspar 10 mg twice a day to control anxiety symptoms and zoloft augmentation  Continue Abilify 2mg PO daily for zoloft augmentation    Visit Time    Visit Start Time: 11:00am  Visit Stop Time: 11:10am  Total Visit Duration:  10 minutes

## 2025-02-12 DIAGNOSIS — E11.9 TYPE 2 DIABETES MELLITUS WITHOUT COMPLICATION, WITHOUT LONG-TERM CURRENT USE OF INSULIN (HCC): ICD-10-CM

## 2025-02-12 NOTE — TELEPHONE ENCOUNTER
Reason for call:   [x] Refill   [] Prior Auth  [] Other:     Office:   [x] PCP/Provider - Jodi Britt MD   [] Specialty/Provider -     Medication: metFORMIN (GLUCOPHAGE) 500 mg tablet     Dose/Frequency: Take 1 tablet (500 mg total) by mouth 2 (two) times a day with meals     Quantity: 60    Pharmacy: [address]     Does the patient have enough for 3 days?   [x] Yes   [] No - Send as HP to POD

## 2025-02-15 DIAGNOSIS — E11.9 TYPE 2 DIABETES MELLITUS WITHOUT COMPLICATION, WITHOUT LONG-TERM CURRENT USE OF INSULIN (HCC): ICD-10-CM

## 2025-02-18 ENCOUNTER — TELEPHONE (OUTPATIENT)
Dept: FAMILY MEDICINE CLINIC | Facility: CLINIC | Age: 45
End: 2025-02-18

## 2025-02-18 DIAGNOSIS — E11.9 TYPE 2 DIABETES MELLITUS WITHOUT COMPLICATION, WITHOUT LONG-TERM CURRENT USE OF INSULIN (HCC): ICD-10-CM

## 2025-02-18 DIAGNOSIS — E78.2 MIXED HYPERLIPIDEMIA: ICD-10-CM

## 2025-02-18 RX ORDER — SIMVASTATIN 10 MG
10 TABLET ORAL
Qty: 30 TABLET | Refills: 0 | Status: SHIPPED | OUTPATIENT
Start: 2025-02-18

## 2025-02-18 NOTE — TELEPHONE ENCOUNTER
Patient had an appointment today but could not be seen because her new insurance is [employer] that is capitated to be seen at [facility].  She would like a 30 day supply of her simvastatin and metformin to last until she either changes her insurance or gets a new doctor.

## 2025-02-18 NOTE — TELEPHONE ENCOUNTER
Patient called the Rxline wanting to know the status of this. Patient only has 1 more dose of metformin. Advised patient provider is probably seeing patient. Please contact patient at [phone] and advise if & when this is going to be done

## 2025-03-18 DIAGNOSIS — E11.9 TYPE 2 DIABETES MELLITUS WITHOUT COMPLICATION, WITHOUT LONG-TERM CURRENT USE OF INSULIN (HCC): ICD-10-CM

## 2025-03-18 NOTE — TELEPHONE ENCOUNTER
Pt states she has an appt to establish care with [facility] on the 24th and she would like a week supply sent in to get her to that appt. Please determine if appropriate.    Reason for call:   [x] Refill   [] Prior Auth  [] Other:     Office:   [x] PCP/Provider - [facility]   [] Specialty/Provider -     Medication:   ~ metFORMIN (GLUCOPHAGE) 500 mg tablet - Take 1 tablet (500 mg total) by mouth 2 (two) times a day with meals     Pharmacy:   [facility] [address] [place] [address] [place] - [address] [place] [address].     Local Pharmacy   Does the patient have enough for 3 days?   [x] Yes   [] No - Send as HP to POD

## 2025-04-29 ENCOUNTER — TELEMEDICINE (OUTPATIENT)
Dept: PSYCHIATRY | Facility: CLINIC | Age: 45
End: 2025-04-29
Payer: COMMERCIAL

## 2025-04-29 DIAGNOSIS — F41.1 GENERALIZED ANXIETY DISORDER: ICD-10-CM

## 2025-04-29 DIAGNOSIS — F32.A DEPRESSION, UNSPECIFIED DEPRESSION TYPE: ICD-10-CM

## 2025-04-29 DIAGNOSIS — F33.42 RECURRENT MAJOR DEPRESSIVE DISORDER, IN FULL REMISSION (HCC): Primary | ICD-10-CM

## 2025-04-29 PROCEDURE — 99212 OFFICE O/P EST SF 10 MIN: CPT | Performed by: PSYCHIATRY & NEUROLOGY

## 2025-04-29 RX ORDER — BUSPIRONE HYDROCHLORIDE 10 MG/1
10 TABLET ORAL 2 TIMES DAILY
Qty: 180 TABLET | Refills: 1 | Status: SHIPPED | OUTPATIENT
Start: 2025-04-29

## 2025-04-29 RX ORDER — SERTRALINE HYDROCHLORIDE 100 MG/1
200 TABLET, FILM COATED ORAL DAILY
Qty: 180 TABLET | Refills: 1 | Status: SHIPPED | OUTPATIENT
Start: 2025-04-29

## 2025-04-29 RX ORDER — ARIPIPRAZOLE 2 MG/1
2 TABLET ORAL DAILY
Qty: 90 TABLET | Refills: 1 | Status: SHIPPED | OUTPATIENT
Start: 2025-04-29

## 2025-04-29 NOTE — PSYCH
MEDICATION MANAGEMENT NOTE    Name: Frannie Jj      : 1980      MRN: [id number]  Encounter Provider: Horacio Fontaine MD  Encounter Date: 2025   Encounter department: [facility]    Insurance: Payor: [employer] / Plan: [employer] / Product Type: Blue Fee for Service /      Reason for Visit:   Chief Complaint   Patient presents with    Virtual Regular Visit    Anxiety    Depression     :  Assessment & Plan  Recurrent major depressive disorder, in full remission (HCC)    Orders:    ARIPiprazole (ABILIFY) 2 mg tablet; Take 1 tablet (2 mg total) by mouth daily At 9am    Generalized anxiety disorder    Orders:    busPIRone (BUSPAR) 10 mg tablet; Take 1 tablet (10 mg total) by mouth 2 (two) times a day At 9am and 6pm    Depression, unspecified depression type    Orders:    sertraline (ZOLOFT) 100 mg tablet; Take 2 tablets (200 mg total) by mouth daily        Treatment Recommendations:  Continue Zoloft 200 mg daily to maintain improvement in anxiety and maintain improvement in depression  Continue Buspar 10 mg twice a day to control anxiety symptoms and zoloft augmentation  Continue Abilify 2mg PO daily for zoloft augmentation  Educated about diagnosis and treatment modalities. Verbalizes understanding and agreement with the treatment plan.  Discussed self monitoring of symptoms, and symptom monitoring tools.  Discussed medications and if treatment adjustment was needed or desired.  Medication management every 3 months  Aware of 24 hour and weekend coverage for urgent situations accessed by calling [facility] main practice number  I am scheduling this patient out for greater than 3 months: No    Medications Risks/Benefits:      Risks, Benefits And Possible Side Effects Of Medications:    Risks, benefits, and possible side effects of medications explained to Frannie and she (or legal representative) verbalizes understanding and agreement for  treatment.    Controlled Medication Discussion:     No records found for controlled prescriptions according to [place] Prescription Drug Monitoring Program.      History of Present Illness     Frannie is seen today for a follow up for depression and anxiety. She continues to do well since the last visit. She states that mood symptoms and anxiety symptoms are stable, controlled, no longer present and states that she still feels  relatively calm and not stressed . She denies any new stressors or side effects. She has not seen her therapist in some time and I told her if she is feeling stable returning to therapy Is her choice and not necessarily needed if she is feeling well. I stated she can decide herself what she wants to do with regards to therapy.     She denies suicidal ideation, intent or plan at present; denies homicidal ideation, intent or plan at present.    She denies auditory hallucinations, denies visual hallucinations, denies delusions.    She denies any side effects from medications.    HPI ROS Appetite Changes and Sleep:     She reports normal sleep, normal appetite, normal energy level    Review Of Systems: A review of systems is obtained and is negative except for the pertinent positives listed in HPI/Subjective above.      Current Rating Scores:     None completed today.    Areas of Improvement: reviewed in HPI/Subjective Section and reviewed in Assessment and Plan Section      Past Medical History:   Diagnosis Date    Anemia     Anxiety     Depression     Diabetes (HCC)     Diabetes mellitus (HCC)     Endometrial cancer (HCC) 04/02/2020    Initial path: A. Uterus, Endometrium, Biopsy: - Consistent with atypical polypoid adenomyoma. See Note.   B. Uterus, Cervical Mass, Excision: - Consistent with atypical polypoid adenomyoma. See Note.   C. 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 See Note    Generalized anxiety  disorder 03/06/2021    GERD (gastroesophageal reflux disease)     History of transfusion 08/2019-3/14/20    Severe episode of recurrent major depressive disorder, without psychotic features (HCC) 03/06/2021    Syncope     Due to blood loss    Uterine cancer (HCC) 2019     Past Surgical History:   Procedure Laterality Date    HYSTERECTOMY  2019    NO PAST SURGERIES      OOPHORECTOMY  2019    [place] LAPS TOTAL HYSTERECT 250 GM/< W/RMVL TUBE/OVARY N/A 4/16/2020    Procedure: ROBOTIC TOTAL LAPAROSCOPIC HYSTERECTOMY, BILATERAL SALPINGO-OOPHORECTOMY, SENTINEL LYMPH NODE BIOPSY;  Surgeon: Leah Bustos MD;  Location: [facility];  Service: Gynecology Oncology     Allergies: No Known Allergies    Current Outpatient Medications   Medication Instructions    ARIPiprazole (ABILIFY) 2 mg, Oral, Daily, At 9am    Blood Glucose Monitoring Suppl (ACCU-CHEK GUIDE) w/Device KIT Daily    busPIRone (BUSPAR) 10 mg, Oral, 2 times daily, At 9am and 6pm    cholecalciferol (VITAMIN D3) 1,000 Units, Daily    Lancets (ACCU-CHEK SOFT TOUCH) lancets Use as instructed once daily    metFORMIN (GLUCOPHAGE) 500 mg, Oral, 2 times daily with meals    sertraline (ZOLOFT) 200 mg, Oral, Daily    simvastatin (ZOCOR) 10 mg, Oral, Daily at bedtime        Substance Abuse History:    Tobacco, Alcohol and Drug Use History     Tobacco Use    Smoking status: Never    Smokeless tobacco: Never   Vaping Use    Vaping status: Never Used   Substance Use Topics    Alcohol use: Not Currently     Comment: couple times per year-holidays    Drug use: No     Alcohol Use: Unknown (4/8/2021)    AUDIT-C     Frequency of Alcohol Consumption: Monthly or less     Average Number of Drinks: 1 or 2       Social History:    Social History     Socioeconomic History    Marital status: Single     Spouse name: Not on file    Number of children: 0    Years of education: Not on file    Highest education level: Some college, no degree   Occupational History    Occupation: Day care worker    Other Topics Concern    Not on file   Social History Narrative    Not on file        Family Psychiatric History:     Family History   Problem Relation Age of Onset    Hypertension Mother     Bipolar disorder Father     No Known Problems Brother     Cancer Maternal Aunt     No Known Problems Maternal Aunt     No Known Problems Maternal Aunt     No Known Problems Paternal Aunt     Lung cancer Maternal Grandfather        Medical History Reviewed by provider this encounter:  Allergies  Meds  Med Hx  Fam Hx          Objective   LMP 03/02/2020      Mental Status Evaluation:    Appearance age appropriate, casually dressed   Behavior cooperative, calm   Speech normal rate, normal volume, normal pitch, spontaneous   Mood euthymic   Affect normal range and intensity, appropriate   Thought Processes organized, logical, coherent, goal directed   Thought Content no overt delusions   Perceptual Disturbances: no auditory hallucinations, no visual hallucinations   Abnormal Thoughts  Risk Potential Suicidal ideation - None  Homicidal ideation - None  Potential for aggression - No   Orientation oriented to person, place, time/date, and situation   Memory recent and remote memory grossly intact   Consciousness alert and awake   Attention Span Concentration Span attention span and concentration are age appropriate   Intellect appears to be of average intelligence   Insight intact   Judgement intact   Muscle Strength and  Gait unable to assess today due to virtual visit   Motor activity no abnormal movements   Language no difficulty naming common objects, no difficulty repeating a phrase   Fund of Knowledge adequate knowledge of current events  adequate fund of knowledge regarding past history  adequate fund of knowledge regarding vocabulary        Laboratory Results: I have personally reviewed all pertinent laboratory/tests results        Suicide/Homicide Risk Assessment:    Risk of Harm to Self:  Based on today's assessment,  Frannie presents the following risk of harm to self: minimal    Risk of Harm to Others:  Based on today's assessment, Frannie presents the following risk of harm to others: minimal    The following interventions are recommended: Continue medication management. No other intervention changes indicated at this time.    Psychotherapy Provided:     Individual psychotherapy provided: No    Treatment Plan:    Completed and signed during the session: Yes - Treatment Plan done but not signed at time of office visit due to: Plan reviewed by video and verbal consent given due to virtual visit.    Goals: Progress towards Treatment Plan goals - Yes, progressing, as evidenced by subjective findings in HPI/Subjective Section and in Assessment and Plan Section    Depression Follow-up Plan Completed: No    Note Share:    This note was not shared with the patient due to reasonable likelihood of causing patient harm    Administrative Statements   Administrative Statements   Encounter provider Horacio Fontaine MD    The Patient is located at Home and in the following state in which I hold an active license [place].    The patient was identified by name and date of birth. Frannie Jj was informed that this is a telemedicine visit and that the visit is being conducted through the Epic Embedded platform. She agrees to proceed..  My office door was closed. No one else was in the room.  She acknowledged consent and understanding of privacy and security of the video platform. The patient has agreed to participate and understands they can discontinue the visit at any time.    I have spent a total time of 15 minutes in caring for this patient on the day of the visit/encounter including Documenting in the medical record, Reviewing/placing orders in the medical record (including tests, medications, and/or procedures), and Obtaining or reviewing history  , not including the time spent for establishing the audio/video connection.    Visit Time  Visit  Start Time: 10am  Visit Stop Time: 10:15am  Total Visit Duration:  15 minutes        Horacio Fontaine MD 04/29/25

## 2025-04-29 NOTE — BH TREATMENT PLAN
TREATMENT PLAN (Medication Management Only)        [facility]    Name and Date of Birth:  Frannie Jj 44 y.o. 1980  MRN: [id number]  Date of Treatment Plan: April 29, 2025  Diagnosis/Diagnoses:    1. Recurrent major depressive disorder, in full remission (HCC)    2. Generalized anxiety disorder    3. Depression, unspecified depression type      Strengths/Personal Resources for Self-Care: taking medications as prescribed, ability to understand psychiatric illness, average or above intelligence, independence.  Area/Areas of need (in own words): anxiety symptoms, depressive symptoms  1. Long Term Goal:   Maintenance of remission of symptoms .  Target Date:6 months - 10/29/2025  Person/Persons responsible for completion of goal: Frannie  2.  Short Term Objective (s) - How will we reach this goal?:   A.  Provider new recommended medication/dosage changes and/or continue medication(s): continue current medications as prescribed.  B.  Attend medication management appointments regularly..  C.  N/A.  Target Date:3 months - 7/29/2025  Person/Persons Responsible for Completion of Goal: Frannie  Progress Towards Goals: stable  Treatment Modality: medication management every 3 months  Review due 180 days from date of this plan: 3 months - 7/29/2025  Expected length of service: maintenance unless revised  My Physician/PA/NP and I have developed this plan together and I agree to work on the goals and objectives. I understand the treatment goals that were developed for my treatment.   Electronic Signatures: on file (unless signed below)    Horacio Fontaine MD 04/29/25

## 2025-06-27 ENCOUNTER — TELEPHONE (OUTPATIENT)
Dept: PSYCHIATRY | Facility: CLINIC | Age: 45
End: 2025-06-27

## 2025-06-27 NOTE — TELEPHONE ENCOUNTER
LVM that 7/14/2025 is cxl due to Dr. Fontaine is on vac. Pt was scheduled for 8/4 but didn't know if pt wanted another appt before then.asked to call back and let us know

## 2025-08-04 ENCOUNTER — TELEMEDICINE (OUTPATIENT)
Dept: PSYCHIATRY | Facility: CLINIC | Age: 45
End: 2025-08-04
Payer: COMMERCIAL

## 2025-08-04 DIAGNOSIS — F33.42 RECURRENT MAJOR DEPRESSIVE DISORDER, IN FULL REMISSION (HCC): Primary | ICD-10-CM

## 2025-08-04 DIAGNOSIS — F32.A DEPRESSION, UNSPECIFIED DEPRESSION TYPE: ICD-10-CM

## 2025-08-04 DIAGNOSIS — F41.1 GENERALIZED ANXIETY DISORDER: ICD-10-CM

## 2025-08-04 PROCEDURE — 99212 OFFICE O/P EST SF 10 MIN: CPT | Performed by: PSYCHIATRY & NEUROLOGY

## 2025-08-04 RX ORDER — SERTRALINE HYDROCHLORIDE 100 MG/1
200 TABLET, FILM COATED ORAL DAILY
Qty: 180 TABLET | Refills: 1 | Status: SHIPPED | OUTPATIENT
Start: 2025-08-04

## (undated) DEVICE — TOWEL SURG XR DETECT GREEN STRL RFD

## (undated) DEVICE — OCCLUDER COLPO-PNEUMO

## (undated) DEVICE — ASTOUND STANDARD SURGICAL GOWN, XL: Brand: CONVERTORS

## (undated) DEVICE — MONOPOLAR CURVED SCISSORS: Brand: ENDOWRIST;DAVINCI SI

## (undated) DEVICE — BLUE HEAT SCOPE WARMER

## (undated) DEVICE — PREMIUM DRY TRAY LF: Brand: MEDLINE INDUSTRIES, INC.

## (undated) DEVICE — TIP COVER ACCESSORY

## (undated) DEVICE — NEEDLE 25G X 1 1/2

## (undated) DEVICE — DRAPE EQUIPMENT RF WAND

## (undated) DEVICE — ADHESIVE SKIN HIGH VISCOSITY EXOFIN 1ML

## (undated) DEVICE — INTENDED FOR TISSUE SEPARATION, AND OTHER PROCEDURES THAT REQUIRE A SHARP SURGICAL BLADE TO PUNCTURE OR CUT.: Brand: BARD-PARKER SAFETY BLADES SIZE 11, STERILE

## (undated) DEVICE — SYRINGE 50ML LL

## (undated) DEVICE — UTERINE MANIPULATOR RUMI 5.1 X 6 CM

## (undated) DEVICE — ANTI-FOG SOLUTION WITH FOAM PAD: Brand: DEVON

## (undated) DEVICE — TRAY FOLEY 16FR URIMETER SILICONE SURESTEP

## (undated) DEVICE — CHLORHEXIDINE 4PCT 4 OZ

## (undated) DEVICE — SPONGE 4 X 4 XRAY 16 PLY STRL LF RFD

## (undated) DEVICE — SPECIMEN TRAP: Brand: ARGYLE

## (undated) DEVICE — ROBOT ACCESSORY KIT 4 ARM

## (undated) DEVICE — PAD GROUNDING ADULT

## (undated) DEVICE — SUT STRATAFIX SPIRAL 2-0 CT-1 30 CM SXPP1B410

## (undated) DEVICE — LARGE NEEDLE DRIVER: Brand: ENDOWRIST;DAVINCI SI

## (undated) DEVICE — GLOVE INDICATOR PI UNDERGLOVE SZ 6.5 BLUE

## (undated) DEVICE — ENDOPATH PNEUMONEEDLE INSUFFLATION NEEDLES WITH LUER LOCK CONNECTORS 120MM: Brand: ENDOPATH

## (undated) DEVICE — ROBOT INST CANNULA 8MM OBTURATOR BLUNT DISP

## (undated) DEVICE — SUT VICRYL 0 CT-1 27 IN J260H

## (undated) DEVICE — KIT, BETHLEHEM ROBOTIC PROST: Brand: CARDINAL HEALTH

## (undated) DEVICE — 40595 XL TRENDELENBURG POSITIONING KIT: Brand: 40595 XL TRENDELENBURG POSITIONING KIT

## (undated) DEVICE — BIPOLAR CORD DISP

## (undated) DEVICE — SUT MONOCRYL 4-0 PS-2 27 IN Y426H

## (undated) DEVICE — PROGRASP FORCEPS: Brand: ENDOWRIST;DAVINCI SI

## (undated) DEVICE — FENESTRATED BIPOLAR FORCEPS: Brand: ENDOWRIST;DAVINCI SI

## (undated) DEVICE — CLOSURE DEVICE ENDO CLOSE

## (undated) DEVICE — BASIC SINGLE BASIN 2-LF: Brand: MEDLINE INDUSTRIES, INC.

## (undated) DEVICE — GLOVE PI ULTRA TOUCH SZ.6.5

## (undated) DEVICE — 1820 FOAM BLOCK NEEDLE COUNTER: Brand: DEVON

## (undated) DEVICE — CHLORAPREP HI-LITE 26ML ORANGE

## (undated) DEVICE — VIAL DECANTER

## (undated) DEVICE — LIGHT HANDLE COVER SLEEVE DISP BLUE STELLAR